# Patient Record
Sex: MALE | Race: WHITE | NOT HISPANIC OR LATINO | Employment: OTHER | URBAN - METROPOLITAN AREA
[De-identification: names, ages, dates, MRNs, and addresses within clinical notes are randomized per-mention and may not be internally consistent; named-entity substitution may affect disease eponyms.]

---

## 2018-09-04 ENCOUNTER — TRANSFERRED RECORDS (OUTPATIENT)
Dept: HEALTH INFORMATION MANAGEMENT | Facility: CLINIC | Age: 78
End: 2018-09-04

## 2018-09-04 LAB
ALBUMIN SERPL-MCNC: 4.7 G/DL
ALP SERPL-CCNC: 85 U/L
ALT SERPL-CCNC: 23 U/L
ANION GAP SERPL CALCULATED.3IONS-SCNC: ABNORMAL MMOL/L
AST SERPL-CCNC: 20 U/L
BILIRUB SERPL-MCNC: 0.7 MG/DL
BUN SERPL-MCNC: 15 MG/DL
CALCIUM SERPL-MCNC: 9 MG/DL
CHLORIDE SERPLBLD-SCNC: 107 MMOL/L
CHOLEST SERPL-MCNC: 98 MG/DL
CO2 SERPL-SCNC: 27 MMOL/L
CREAT SERPL-MCNC: 0.87 MG/DL
GFR SERPL CREATININE-BSD FRML MDRD: 84 ML/MIN/1.73M2
GLUCOSE SERPL-MCNC: 119 MG/DL (ref 70–99)
HDLC SERPL-MCNC: 41 MG/DL
LDLC SERPL CALC-MCNC: 40 MG/DL
NONHDLC SERPL-MCNC: 57 MG/DL
POTASSIUM SERPL-SCNC: 4.3 MMOL/L
PROT SERPL-MCNC: 7.1 G/DL
SODIUM SERPL-SCNC: 141 MMOL/L
TRIGL SERPL-MCNC: 86 MG/DL

## 2018-09-07 ENCOUNTER — HOSPITAL ENCOUNTER (OUTPATIENT)
Dept: CARDIOLOGY | Facility: CLINIC | Age: 78
Discharge: HOME | End: 2018-09-07
Admitting: INTERNAL MEDICINE
Payer: MEDICARE

## 2018-09-07 DIAGNOSIS — R01.1 HEART MURMUR: ICD-10-CM

## 2018-09-07 PROCEDURE — 93306 TTE W/DOPPLER COMPLETE: CPT | Mod: 26 | Performed by: INTERNAL MEDICINE

## 2018-09-07 PROCEDURE — 93306 TTE W/DOPPLER COMPLETE: CPT

## 2018-09-07 PROCEDURE — 25500064 ZZH RX 255 OP 636

## 2018-09-07 RX ADMIN — HUMAN ALBUMIN MICROSPHERES AND PERFLUTREN 3 ML: 10; .22 INJECTION, SOLUTION INTRAVENOUS at 11:18

## 2018-10-05 ENCOUNTER — DOCUMENTATION ONLY (OUTPATIENT)
Dept: CARDIOLOGY | Facility: CLINIC | Age: 78
End: 2018-10-05

## 2018-10-18 ENCOUNTER — CARE COORDINATION (OUTPATIENT)
Dept: CARDIOLOGY | Facility: CLINIC | Age: 78
End: 2018-10-18

## 2018-10-18 NOTE — PROGRESS NOTES
"Called to patient states had an angiogram [facility] for chest pains Approx 10 yrs ago and reports \"they did not find anything with my heart\" - he does not have a copy of records but will sign release at visit -  Alerted rooming MA to please obtain release and fax for records.  Called to Primary MD office DR Torres [facility] - requested records.  Joan Sandoval RN 10/18/18 11:53 AM         "

## 2018-10-25 ENCOUNTER — OFFICE VISIT (OUTPATIENT)
Dept: CARDIOLOGY | Facility: CLINIC | Age: 78
End: 2018-10-25
Payer: MEDICARE

## 2018-10-25 ENCOUNTER — HOSPITAL ENCOUNTER (OUTPATIENT)
Dept: GENERAL RADIOLOGY | Facility: CLINIC | Age: 78
Discharge: HOME | End: 2018-10-25
Attending: INTERNAL MEDICINE | Admitting: INTERNAL MEDICINE
Payer: MEDICARE

## 2018-10-25 ENCOUNTER — CARE COORDINATION (OUTPATIENT)
Dept: CARDIOLOGY | Facility: CLINIC | Age: 78
End: 2018-10-25

## 2018-10-25 VITALS
WEIGHT: 191 LBS | HEART RATE: 66 BPM | OXYGEN SATURATION: 97 % | SYSTOLIC BLOOD PRESSURE: 130 MMHG | BODY MASS INDEX: 26.74 KG/M2 | DIASTOLIC BLOOD PRESSURE: 70 MMHG | HEIGHT: 71 IN

## 2018-10-25 DIAGNOSIS — E11.9 TYPE 2 DIABETES MELLITUS WITHOUT COMPLICATION, WITHOUT LONG-TERM CURRENT USE OF INSULIN (H): ICD-10-CM

## 2018-10-25 DIAGNOSIS — I10 BENIGN ESSENTIAL HYPERTENSION: ICD-10-CM

## 2018-10-25 DIAGNOSIS — I35.0 NONRHEUMATIC AORTIC VALVE STENOSIS: Primary | ICD-10-CM

## 2018-10-25 DIAGNOSIS — E78.5 HYPERLIPIDEMIA LDL GOAL <130: ICD-10-CM

## 2018-10-25 DIAGNOSIS — I35.0 NONRHEUMATIC AORTIC VALVE STENOSIS: ICD-10-CM

## 2018-10-25 PROCEDURE — 71046 X-RAY EXAM CHEST 2 VIEWS: CPT

## 2018-10-25 PROCEDURE — 99204 OFFICE O/P NEW MOD 45 MIN: CPT | Performed by: INTERNAL MEDICINE

## 2018-10-25 PROCEDURE — 93000 ELECTROCARDIOGRAM COMPLETE: CPT | Performed by: INTERNAL MEDICINE

## 2018-10-25 RX ORDER — DILTIAZEM HYDROCHLORIDE 240 MG/1
240 CAPSULE, COATED, EXTENDED RELEASE ORAL DAILY
Status: ON HOLD | COMMUNITY
End: 2023-02-14

## 2018-10-25 RX ORDER — ROSUVASTATIN CALCIUM 20 MG/1
20 TABLET, COATED ORAL DAILY
COMMUNITY
End: 2022-04-07

## 2018-10-25 RX ORDER — VITAMIN E 268 MG
400 CAPSULE ORAL DAILY
COMMUNITY

## 2018-10-25 RX ORDER — ASPIRIN 325 MG
325 TABLET ORAL DAILY
COMMUNITY
End: 2019-10-03

## 2018-10-25 NOTE — PROGRESS NOTES
Reviewed the AVS (After Visit Summary) with patient in detail following their office visit with Dr. Villatoro. The patient was educated on the outlined plan of care including ordered tests, labs, medication changes, and follow up. Patient verbalized understanding of the information and agrees to call with any questions or concerns. Wife present.  Reviewed SKYLER procedure instructions - denies oral issues or surgeries.  Faxed 2nd request for Primary MD records and labs from Dr Torres in [place]  Return appointment: Patient was given instructions on when and how to schedule their next office visit with the clinic.  Joan SHANKARN, MA, RN  RN Care Coordinator  [facility]  [phone]

## 2018-10-25 NOTE — MR AVS SNAPSHOT
After Visit Summary   10/25/2018    Reynaldo Monteiro    MRN: [id number]           Patient Information     Date Of Birth          1940        Visit Information        Provider Department      10/25/2018 12:45 PM Kulwinder Villatoro MD [facility]        Today's Diagnoses     Nonrheumatic aortic valve stenosis    -  1    Benign essential hypertension        Type 2 diabetes mellitus without complication, without long-term current use of insulin (H)          Care Instructions    ADDITIONAL TESTIN.  Transesophageal echocardiogram.  To be done my by myself at [facility].  2.  Nursing to obtain labs from [facility] done recently.  I will require the following - CBC, comprehensive metabolic panel, NT proBNP, TSH, fasting lipid panel.  3.  Chest x-ray 2 views.    FOLLOW-UP:  With the results of the above.    If you have any questions or concerns, please call my nurse Joan Wells at [phone].            Follow-ups after your visit        Additional Services     Follow-Up with Cardiologist       POST SKYLER                  Your next 10 appointments already scheduled     Oct 25, 2018  3:30 PM CDT   XR CHEST 2 VIEWS with [id number]   [facility] ([facility])    [address] [address] [address] [phone]           How do I prepare for my exam? (Food and drink instructions) No Food and Drink Restrictions.  How do I prepare for my exam? (Other instructions) You do not need to do anything special for this exam.  What should I wear: Wear comfortable clothes.  How long does the exam take: Most scans take less than 5 minutes.  What should I bring: Bring a list of your medicines, including vitamins, minerals and over-the-counter drugs. It is safest to leave personal items at home.  Do I need a :  No  is needed.  What do I need to tell my doctor: Tell your doctor if there s  any chance you are pregnant.  What should I do after the exam: No restrictions, You may resume normal activities.  What is this test: An image of a specific body part shown in shades of black and white.  Who should I call with questions: If you have any questions, please call the Imaging Department where you will have your exam. Directions, parking instructions, and other information is available on our website, [email or web address].            Nov 01, 2018  9:30 AM CDT   (Arrive by 7:30 AM)   Ech Carter with [id number]   [facility] ([facility])    [address] [place] [address]   [phone]           1.  Please bring or wear a comfortable two-piece outfit. 2.  Arrival time: -   [facility]:  arrive 75 minutes prior to examination time. -   [facility]:  arrive 90 minutes prior to examination time. -   [facility]:   arrive 15 minutes prior to examination time. 3.  Plan to have a responsible adult take you home after the test. After the exam you may not drive, take a bus or taxi by yourself. -   Someone should stay with you for 6 hours after your test. 4.  No food or drink: -   6 hours before the test 5.  If you take antacids or water pills (diuretics): Do not take them until after your test. You may take blood pressure medicine with a few sips of water. 6.  If you have diabetes: -   Morning slots preferred -   If you take insulin, call your diabetes care team. Ask if you should take a   dose the morning of your test. -   If you take diabetes medicine by mouth, don't take it on the morning of your test. Bring it with you to take after the test. (If you have questions, call your diabetes care team.) 7.  Bring a list of any medicines you are taking. 8.  Do not drive for 24 hours after the test. 9.  For any questions that cannot be answered, please contact the ordering physician 10. Please do not wear perfumes or scented lotions on the day of your exam.            Nov 26, 2018  " 3:15 PM CST   Return Visit with Kulwinder Villatoro MD   [facility] ([facility])    [id number]  [place] [address] [phone] OPT 2              Future tests that were ordered for you today     Open Future Orders        Priority Expected Expires Ordered    Follow-Up with Cardiologist Routine 11/24/2018 10/25/2019 10/25/2018    XR Chest 2 Views Routine 11/1/2018 10/25/2019 10/25/2018    Transesophageal Echocardiogram Routine 11/9/2018 10/25/2019 10/25/2018            Who to contact     If you have questions or need follow up information about today's clinic visit or your schedule please contact [facility] directly at [phone].  Normal or non-critical lab and imaging results will be communicated to you by MyChart, letter or phone within 4 business days after the clinic has received the results. If you do not hear from us within 7 days, please contact the clinic through MyChart or phone. If you have a critical or abnormal lab result, we will notify you by phone as soon as possible.  Submit refill requests through [employer] or call your pharmacy and they will forward the refill request to us. Please allow 3 business days for your refill to be completed.          Additional Information About Your Visit        Care EveryWhere ID     This is your Care EveryWhere ID. This could be used by other organizations to access your [place] medical records  [id number]        Your Vitals Were     Pulse Height Pulse Oximetry BMI (Body Mass Index)          66 1.791 m (5' 10.5\") 97% 27.02 kg/m2         Blood Pressure from Last 3 Encounters:   10/25/18 130/70    Weight from Last 3 Encounters:   10/25/18 86.6 kg (191 lb)              We Performed the Following     EKG 12-lead complete w/read - Clinics (performed today)        Primary Care Provider Fax #    [facility] [phone]       [address] [place] [address] " [facility] [address]        Equal Access to Services     HALEY LARA : Hadii aad ku hadcharlotteconsuelo Galvin, wabereketda dorothea, qacalixtota phyllis kamara. So [facility] [phone].    ATENCIÓN: Si habla español, tiene a evans disposición servicios gratuitos de asistencia lingüística. [facility] [phone].    We comply with applicable federal civil rights laws and [place] laws. We do not discriminate on the basis of race, color, national origin, age, disability, sex, sexual orientation, or gender identity.            Thank you!     Thank you for choosing [facility]  for your care. Our goal is always to provide you with excellent care. Hearing back from our patients is one way we can continue to improve our services. Please take a few minutes to complete the written survey that you may receive in the mail after your visit with us. Thank you!             Your Updated Medication List - Protect others around you: Learn how to safely use, store and throw away your medicines at www.disposemymeds.org.          This list is accurate as of 10/25/18  2:45 PM.  Always use your most recent med list.                   Brand Name Dispense Instructions for use Diagnosis    aspirin 325 MG tablet      Take 325 mg by mouth daily        CARTIA  MG 24 hr capsule   Generic drug:  diltiazem      Take by mouth daily        DOXAZOSIN MESYLATE PO      Take 4 mg by mouth Take 1/2 tablet at bed time        METFORMIN HCL PO      Take 500 mg by mouth Take  1/2 tablet daily        Metoprolol Succinate 25 MG Cs24      Take 25 mg by mouth        rosuvastatin 20 MG tablet    CRESTOR     Take 20 mg by mouth daily        SUPER B COMPLEX MAXI PO           VITAMIN D (CHOLECALCIFEROL) PO      Take 2,000 Units by mouth daily        vitamin E 400 UNIT capsule      Take 400 Units by mouth daily

## 2018-10-25 NOTE — LETTER
10/25/2018    [address] [address]    RE: Reynaldo Monteiro       Dear Colleague,    I had the pleasure of seeing Reynaldo Monteiro in the [facility].    Clinic visit note dictated. Dictation reference number - [id number]        REVIEW OF SYSTEMS:  A comprehensive 10-point review of systems was completed and the pertinent positives are documented in the history of present illness.    Skin:  Positive for hair changes   Eyes:  Positive for glasses  ENT:  Negative    Respiratory:  Negative    Cardiovascular:  Negative    Gastroenterology: Negative    Genitourinary:  Positive for urinary frequency  Musculoskeletal:  Positive for back pain  Neurologic:  Positive for stroke  Psychiatric:  Negative    Heme/Lymph/Imm:  Negative    Endocrine:  Positive for diabetes    CURRENT MEDICATIONS:  Current Outpatient Prescriptions   Medication Sig Dispense Refill     aspirin 325 MG tablet Take 325 mg by mouth daily       B Complex-Folic Acid (SUPER B COMPLEX MAXI PO)        diltiazem (CARTIA XT) 240 MG 24 hr capsule Take by mouth daily       DOXAZOSIN MESYLATE PO Take 4 mg by mouth Take 1/2 tablet at bed time       METFORMIN HCL PO Take 500 mg by mouth Take  1/2 tablet daily       Metoprolol Succinate 25 MG CS24 Take 25 mg by mouth       rosuvastatin (CRESTOR) 20 MG tablet Take 20 mg by mouth daily       VITAMIN D, CHOLECALCIFEROL, PO Take 2,000 Units by mouth daily       vitamin E 400 UNIT capsule Take 400 Units by mouth daily           ALLERGIES:  No Known Allergies    PAST MEDICAL HISTORY:    Past Medical History:   Diagnosis Date     Aortic stenosis      Chest pain        PAST SURGICAL HISTORY:    Past Surgical History:   Procedure Laterality Date     ANGIOGRAM      Patient reports coronary angiogram at [place] approx 10 years ago       FAMILY HISTORY:    Family History   Problem Relation Age of Onset     Hypertension Mother      valve replacement       "Coronary Artery Disease Father        SOCIAL HISTORY:    Social History     Social History     Marital status:      Spouse name: N/A     Number of children: N/A     Years of education: N/A     Social History Main Topics     Smoking status: Never Smoker     Smokeless tobacco: Never Used     Alcohol use Yes      Comment: 1-2 a week      Drug use: No     Sexual activity: Not Asked     Other Topics Concern     None     Social History Narrative       PHYSICAL EXAM:    Vitals: /70 (BP Location: Right arm, Patient Position: Sitting, Cuff Size: Adult Large)  Pulse 66  Ht 1.791 m (5' 10.5\")  Wt 86.6 kg (191 lb)  SpO2 97%  BMI 27.02 kg/m2  Wt Readings from Last 5 Encounters:   10/25/18 86.6 kg (191 lb)           Encounter Diagnoses   Name Primary?     Nonrheumatic aortic valve stenosis Yes     Benign essential hypertension      Type 2 diabetes mellitus without complication, without long-term current use of insulin (H)        Orders Placed This Encounter   Procedures     XR Chest 2 Views     Follow-Up with Cardiologist     EKG 12-lead complete w/read - Clinics (performed today)     Transesophageal Echocardiogram       CC  REFERRING PROVIDER:  [place] [address]                  Thank you for allowing me to participate in the care of your patient.      Sincerely,     Kulwinder Villatoro MD     [facility]    cc:   [place] [address]        "

## 2018-10-25 NOTE — LETTER
10/25/2018      [address] [address]      RE: Reynaldo Monteiro       Dear Colleague,    I had the pleasure of seeing Reynaldo Monteiro in the [facility].    Service Date: 10/25/2018      LOCATION:  [facility], [place], [place]       PRIMARY CARE PROVIDER:  [facility]      REASON FOR VISIT:   1.  New finding of severe aortic valve stenosis on recent echocardiogram.      HISTORY OF PRESENT ILLNESS:  Mr. Monteiro is new to Cardiology.  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  His medical history is significant for optimally treated hypertension, hyperlipidemia (on rosuvastatin 20 mg daily), type 2 diabetes mellitus (on metformin), BMI 27 kg/m2, a lifelong never tobacco user.      The patient went to see his primary care provider recently and a cardiac murmur was auscultated.  Echocardiogram was consistent with severe aortic valve stenosis.  I personally reviewed the images.  The aortic valve is heavily calcified, probably trileaflet, and has severe stenosis with a peak velocity of 4.0 meters per second, mean gradient 39 mmHg and a calculated valve area of 1.2 cm2.  There are small mobile echodensities on the aortic valve, which may represent degenerative strands.  There is mild aortic valve regurgitation.  There is mild concentric left ventricular hypertrophy with normal systolic function.  Visually estimated LVEF 55%-60%.  Grade I diastolic function.  Moderate mitral annular calcification with trace regurgitation.  Ascending aorta measures 3.9 cm, aortic root 3.9 cm.      The patient is completely asymptomatic.  He does a lot of physical work around the house, walks extensively and denies any chest pain, dyspnea, palpitations, presyncope.  There is no family history of known valvular heart disease.  He has never had infective endocarditis in the past.  No history of  chest wall irradiation.      LABS - He has it done through his primary care provider at the [facility].  I did not have access to them today.      ECG done today shows sinus rhythm of 70 BPM, right bundle branch block with left axis (bifascicular block).   milliseconds, QTc 435 milliseconds.      Transthoracic echocardiogram 09/07/2018 - As above.      CURRENT MEDICATIONS:   1.  Aspirin 325 mg daily.   2.  Diltiazem (Cartia XT) 240 mg daily.   3.  Doxazosin 2 mg at bedtime.   4.  Metformin 250 mg daily.   5.  Metoprolol succinate 25 mg daily.   6.  Rosuvastatin 20 mg daily.   7.  Vitamin D.   8.  Vitamin E.   9.  B complex.      ALLERGIES:  No known allergies.      PHYSICAL EXAMINATION:   VITAL SIGNS:  /70, pulse 66 per minute, height 1.8 meters (5 feet 11 inches), weight 86.6 kg (191 pounds), sats 97% on room air, BMI 27.0 kg/m2.   CONSTITUTIONAL:  Comfortable at rest, normal body habitus, looks younger than his stated age.   EYES:  Normal.   ENT:  Satisfactory dentition, no cyanosis.   NECK:  No thyromegaly.   LUNGS:  Clear to auscultation.   CARDIOVASCULAR:  Normal JVP.  Carotid upstroke is normal with good volume.  Apical impulse undisplaced.  Heart sounds are regular.  Second heart sound is audible.  Late peaking ejection systolic murmur, 3/6 consistent with significant aortic valve stenosis.   GASTROINTESTINAL:  Soft, nontender, no hepatosplenomegaly.  Active bowel sounds.   SKIN/MUSCULOSKELETAL/NEUROPSYCHIATRIC:  Normal.   EXTREMITIES:  No edema or clubbing.      DIAGNOSES:   1.  Significant nonrheumatic aortic valve stenosis (peak velocity 4.0 meters per second, mean gradient 39 mmHg).   2.  Hypertension.   3.  Type 2 diabetes mellitus without complication, not on insulin.   4.  Hyperlipidemia.      ASSESSMENT/PLAN:   1.  Clinically, the patient has significant, but not critical aortic valve stenosis.  The murmur is late peaking, but the second heart sound is audible, and carotid  upstroke is normal.  Similarly, on echocardiogram, there are some discrepant findings.  The peak velocity and mean gradient are in the severe category, but the valve area is moderate at 1.2 cm2.  He also has some mobile echodensities on his aortic valve which could be degenerative strands.  To evaluate further a transesophageal echocardiogram would be helpful.   2.  Transesophageal echocardiogram.  I would like to perform this personally.  Scheduled for next week, 2018.   3.  Chest x-ray.   4.  He will bring us a copy of his labs from his primary care provider.   5.  Follow up with the results of the SKYLER.  If the aortic valve stenosis is indeed severe, given that the patient denies any symptoms, it would be reasonable to proceed to a treadmill stress test.      It was my pleasure to visit with this melissa couple.  I look forward to seeing them again.      cc:   [address] [address]         [facility] J[facility]             D: 10/29/2018   T: 10/29/2018   MT: al      Name:     BUTCH MANUEL   MRN:      [id number]        Account:      [id number]   :      1940           Service Date: 10/25/2018      Document: [id number]         Outpatient Encounter Prescriptions as of 10/25/2018   Medication Sig Dispense Refill     aspirin 325 MG tablet Take 325 mg by mouth daily       B Complex-Folic Acid (SUPER B COMPLEX MAXI PO)        diltiazem (CARTIA XT) 240 MG 24 hr capsule Take by mouth daily       DOXAZOSIN MESYLATE PO Take 4 mg by mouth Take 1/2 tablet at bed time       METFORMIN HCL PO Take 500 mg by mouth Take  1/2 tablet daily       Metoprolol Succinate 25 MG CS24 Take 25 mg by mouth       rosuvastatin (CRESTOR) 20 MG tablet Take 20 mg by mouth daily       VITAMIN D, CHOLECALCIFEROL, PO Take 2,000 Units by mouth daily       vitamin E 400 UNIT capsule Take 400 Units by mouth daily       No facility-administered encounter medications on file as of 10/25/2018.         Again, thank you for allowing me to participate in the care of your patient.      Sincerely,    Kulwinder Villatoro MD     [facility]

## 2018-10-29 NOTE — PROGRESS NOTES
Service Date: 10/25/2018      LOCATION:  [facility], [place], [place]       PRIMARY CARE PROVIDER:  [facility]      REASON FOR VISIT:   1.  New finding of severe aortic valve stenosis on recent echocardiogram.      HISTORY OF PRESENT ILLNESS:    Mr. Monteiro is new to Cardiology.  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  His medical history is significant for optimally treated hypertension, hyperlipidemia (on rosuvastatin 20 mg daily), type 2 diabetes mellitus (on metformin), BMI 27 kg/m2, a lifelong never tobacco user.      The patient went to see his primary care provider recently and a cardiac murmur was auscultated.  Echocardiogram was consistent with severe aortic valve stenosis.  I personally reviewed the images.  The aortic valve is heavily calcified, probably trileaflet, and has severe stenosis with a peak velocity of 4.0 meters per second, mean gradient 39 mmHg and a calculated valve area of 1.2 cm2.  There are small mobile echodensities on the aortic valve, which may represent degenerative strands.  There is mild aortic valve regurgitation.  There is mild concentric left ventricular hypertrophy with normal systolic function.  Visually estimated LVEF 55%-60%.  Grade I diastolic function.  Moderate mitral annular calcification with trace regurgitation.  Ascending aorta measures 3.9 cm, aortic root 3.9 cm.      The patient is completely asymptomatic.  He does a lot of physical work around the house, walks extensively and denies any chest pain, dyspnea, palpitations, presyncope.  There is no family history of known valvular heart disease.  He has never had infective endocarditis in the past.  No history of chest wall irradiation.      LABS - He has it done through his primary care provider at the [facility].  I did not have access to them today.      ECG done today shows sinus rhythm of 70 BPM, right bundle branch  block with left axis (bifascicular block).   milliseconds, QTc 435 milliseconds.      Transthoracic echocardiogram 09/07/2018 - As above.      CURRENT MEDICATIONS:   1.  Aspirin 325 mg daily.   2.  Diltiazem (Cartia XT) 240 mg daily.   3.  Doxazosin 2 mg at bedtime.   4.  Metformin 250 mg daily.   5.  Metoprolol succinate 25 mg daily.   6.  Rosuvastatin 20 mg daily.   7.  Vitamin D.   8.  Vitamin E.   9.  B complex.      ALLERGIES:  No known allergies.      PHYSICAL EXAMINATION:   VITAL SIGNS:  /70, pulse 66 per minute, height 1.8 meters (5 feet 11 inches), weight 86.6 kg (191 pounds), sats 97% on room air, BMI 27.0 kg/m2.   CONSTITUTIONAL:  Comfortable at rest, normal body habitus, looks younger than his stated age.   EYES:  Normal.   ENT:  Satisfactory dentition, no cyanosis.   NECK:  No thyromegaly.   LUNGS:  Clear to auscultation.   CARDIOVASCULAR:  Normal JVP.  Carotid upstroke is normal with good volume.  Apical impulse undisplaced.  Heart sounds are regular.  Second heart sound is audible.  Late peaking ejection systolic murmur, 3/6 consistent with significant aortic valve stenosis.   GASTROINTESTINAL:  Soft, nontender, no hepatosplenomegaly.  Active bowel sounds.   SKIN/MUSCULOSKELETAL/NEUROPSYCHIATRIC:  Normal.   EXTREMITIES:  No edema or clubbing.      DIAGNOSES:   1.  Significant nonrheumatic aortic valve stenosis (peak velocity 4.0 meters per second, mean gradient 39 mmHg).   2.  Hypertension.   3.  Type 2 diabetes mellitus without complication, not on insulin.   4.  Hyperlipidemia.      ASSESSMENT/PLAN:   1.  Clinically, the patient has significant, but not critical aortic valve stenosis.  The murmur is late peaking, but the second heart sound is audible, and carotid upstroke is normal.  Similarly, on echocardiogram, there are some discrepant findings.  The peak velocity and mean gradient are in the severe category, but the valve area is moderate at 1.2 cm2.  He also has some mobile  echodensities on his aortic valve which could be degenerative strands.  To evaluate further a transesophageal echocardiogram would be helpful.   2.  Chest x-ray.   3.  He will bring us a copy of his labs from his primary care provider.   4.  Follow up with the results of the SKYLER.  If the aortic valve stenosis is indeed severe, given that the patient denies any symptoms, it would be reasonable to proceed to a treadmill stress test.      It was my pleasure to visit with this melissa couple.  I look forward to seeing them again.      cc:   [address] [address]         [facility] JEREMIE LI MD             D: 10/29/2018   T: 10/29/2018   MT: al      Name:     BUTCH MANUEL   MRN:      [id number]        Account:      [id number]   :      1940           Service Date: 10/25/2018      Document: [id number]

## 2018-10-29 NOTE — PROGRESS NOTES
Clinic visit note dictated. Dictation reference number - [id number]        REVIEW OF SYSTEMS:  A comprehensive 10-point review of systems was completed and the pertinent positives are documented in the history of present illness.    Skin:  Positive for hair changes   Eyes:  Positive for glasses  ENT:  Negative    Respiratory:  Negative    Cardiovascular:  Negative    Gastroenterology: Negative    Genitourinary:  Positive for urinary frequency  Musculoskeletal:  Positive for back pain  Neurologic:  Positive for stroke  Psychiatric:  Negative    Heme/Lymph/Imm:  Negative    Endocrine:  Positive for diabetes    CURRENT MEDICATIONS:  Current Outpatient Prescriptions   Medication Sig Dispense Refill     aspirin 325 MG tablet Take 325 mg by mouth daily       B Complex-Folic Acid (SUPER B COMPLEX MAXI PO)        diltiazem (CARTIA XT) 240 MG 24 hr capsule Take by mouth daily       DOXAZOSIN MESYLATE PO Take 4 mg by mouth Take 1/2 tablet at bed time       METFORMIN HCL PO Take 500 mg by mouth Take  1/2 tablet daily       Metoprolol Succinate 25 MG CS24 Take 25 mg by mouth       rosuvastatin (CRESTOR) 20 MG tablet Take 20 mg by mouth daily       VITAMIN D, CHOLECALCIFEROL, PO Take 2,000 Units by mouth daily       vitamin E 400 UNIT capsule Take 400 Units by mouth daily           ALLERGIES:  No Known Allergies    PAST MEDICAL HISTORY:    Past Medical History:   Diagnosis Date     Aortic stenosis      Chest pain        PAST SURGICAL HISTORY:    Past Surgical History:   Procedure Laterality Date     ANGIOGRAM      Patient reports coronary angiogram at [place], [place] approx 10 years ago       FAMILY HISTORY:    Family History   Problem Relation Age of Onset     Hypertension Mother      valve replacement      Coronary Artery Disease Father        SOCIAL HISTORY:    Social History     Social History     Marital status:      Spouse name: N/A     Number of children: N/A     Years of education: N/A     Social History Main  "Topics     Smoking status: Never Smoker     Smokeless tobacco: Never Used     Alcohol use Yes      Comment: 1-2 a week      Drug use: No     Sexual activity: Not Asked     Other Topics Concern     None     Social History Narrative       PHYSICAL EXAM:    Vitals: /70 (BP Location: Right arm, Patient Position: Sitting, Cuff Size: Adult Large)  Pulse 66  Ht 1.791 m (5' 10.5\")  Wt 86.6 kg (191 lb)  SpO2 97%  BMI 27.02 kg/m2  Wt Readings from Last 5 Encounters:   10/25/18 86.6 kg (191 lb)           Encounter Diagnoses   Name Primary?     Nonrheumatic aortic valve stenosis Yes     Benign essential hypertension      Type 2 diabetes mellitus without complication, without long-term current use of insulin (H)        Orders Placed This Encounter   Procedures     XR Chest 2 Views     Follow-Up with Cardiologist     EKG 12-lead complete w/read - Clinics (performed today)     Transesophageal Echocardiogram       CC  REFERRING PROVIDER:  [address] [address]                "

## 2018-10-30 ENCOUNTER — TELEPHONE (OUTPATIENT)
Dept: CARDIOLOGY | Facility: CLINIC | Age: 78
End: 2018-10-30

## 2018-10-30 NOTE — TELEPHONE ENCOUNTER
SKYLER  Scheduled: 11-01-18  Location: [facility]  Check-in time: 730 am   Procedure time:  930 am    Nothing to eat or drink for 6 hours before the procedure.      Patient should not take antacids on the morning of the procedure.     Medications that can be taken on the morning of the procedure (with small sips of water): ASA, Diltiazem, Metoprolol Succinate.       Patient should take his other medications when he returns home.       Patient will need to arrange for someone to drive him home and stay with him for 6 hours after the procedure.     ----------------------------------------------------------------------------------------------------------------------    Prep instructions were reviewed with patient. Patient plans on not taking any medications on the morning of the procedure. Patient has no questions Maria Dolores ANDERSON

## 2018-11-01 ENCOUNTER — HOSPITAL ENCOUNTER (OUTPATIENT)
Facility: CLINIC | Age: 78
Discharge: HOME | End: 2018-11-01
Attending: INTERNAL MEDICINE | Admitting: INTERNAL MEDICINE
Payer: MEDICARE

## 2018-11-01 ENCOUNTER — HOSPITAL ENCOUNTER (OUTPATIENT)
Dept: CARDIOLOGY | Facility: CLINIC | Age: 78
End: 2018-11-01
Attending: INTERNAL MEDICINE | Admitting: INTERNAL MEDICINE
Payer: MEDICARE

## 2018-11-01 VITALS
BODY MASS INDEX: 26.99 KG/M2 | HEIGHT: 71 IN | TEMPERATURE: 96.6 F | WEIGHT: 192.8 LBS | OXYGEN SATURATION: 95 % | HEART RATE: 67 BPM | RESPIRATION RATE: 9 BRPM | SYSTOLIC BLOOD PRESSURE: 132 MMHG | DIASTOLIC BLOOD PRESSURE: 79 MMHG

## 2018-11-01 DIAGNOSIS — I35.0 NONRHEUMATIC AORTIC VALVE STENOSIS: ICD-10-CM

## 2018-11-01 DIAGNOSIS — E11.9 TYPE 2 DIABETES MELLITUS WITHOUT COMPLICATION, WITHOUT LONG-TERM CURRENT USE OF INSULIN (H): ICD-10-CM

## 2018-11-01 DIAGNOSIS — I10 BENIGN ESSENTIAL HYPERTENSION: ICD-10-CM

## 2018-11-01 PROCEDURE — 93312 ECHO TRANSESOPHAGEAL: CPT | Mod: 26 | Performed by: INTERNAL MEDICINE

## 2018-11-01 PROCEDURE — 40000857 ZZH STATISTIC TEE INCLUDES SEDATION

## 2018-11-01 PROCEDURE — 93325 DOPPLER ECHO COLOR FLOW MAPG: CPT | Mod: 26 | Performed by: INTERNAL MEDICINE

## 2018-11-01 PROCEDURE — 25000125 ZZHC RX 250: Performed by: INTERNAL MEDICINE

## 2018-11-01 PROCEDURE — 93320 DOPPLER ECHO COMPLETE: CPT

## 2018-11-01 PROCEDURE — 25000128 H RX IP 250 OP 636: Performed by: INTERNAL MEDICINE

## 2018-11-01 PROCEDURE — 40000235 ZZH STATISTIC TELEMETRY

## 2018-11-01 PROCEDURE — 93320 DOPPLER ECHO COMPLETE: CPT | Mod: 26 | Performed by: INTERNAL MEDICINE

## 2018-11-01 RX ORDER — FLUMAZENIL 0.1 MG/ML
0.2 INJECTION, SOLUTION INTRAVENOUS
Status: DISCONTINUED | OUTPATIENT
Start: 2018-11-01 | End: 2018-11-01 | Stop reason: HOSPADM

## 2018-11-01 RX ORDER — SODIUM CHLORIDE 9 MG/ML
INJECTION, SOLUTION INTRAVENOUS CONTINUOUS PRN
Status: DISCONTINUED | OUTPATIENT
Start: 2018-11-01 | End: 2018-11-01 | Stop reason: HOSPADM

## 2018-11-01 RX ORDER — GLYCOPYRROLATE 0.2 MG/ML
0.1 INJECTION, SOLUTION INTRAMUSCULAR; INTRAVENOUS ONCE
Status: COMPLETED | OUTPATIENT
Start: 2018-11-01 | End: 2018-11-01

## 2018-11-01 RX ORDER — FENTANYL CITRATE 50 UG/ML
25-50 INJECTION, SOLUTION INTRAMUSCULAR; INTRAVENOUS
Status: COMPLETED | OUTPATIENT
Start: 2018-11-01 | End: 2018-11-01

## 2018-11-01 RX ORDER — FENTANYL CITRATE 50 UG/ML
25 INJECTION, SOLUTION INTRAMUSCULAR; INTRAVENOUS
Status: DISCONTINUED | OUTPATIENT
Start: 2018-11-01 | End: 2018-11-01 | Stop reason: HOSPADM

## 2018-11-01 RX ORDER — NALOXONE HYDROCHLORIDE 0.4 MG/ML
.1-.4 INJECTION, SOLUTION INTRAMUSCULAR; INTRAVENOUS; SUBCUTANEOUS
Status: DISCONTINUED | OUTPATIENT
Start: 2018-11-01 | End: 2018-11-01 | Stop reason: HOSPADM

## 2018-11-01 RX ORDER — LIDOCAINE 40 MG/G
CREAM TOPICAL
Status: DISCONTINUED | OUTPATIENT
Start: 2018-11-01 | End: 2018-11-01 | Stop reason: HOSPADM

## 2018-11-01 RX ORDER — LIDOCAINE HYDROCHLORIDE 40 MG/ML
1.5 SOLUTION TOPICAL ONCE
Status: COMPLETED | OUTPATIENT
Start: 2018-11-01 | End: 2018-11-01

## 2018-11-01 RX ADMIN — LIDOCAINE HYDROCHLORIDE 1.5 ML: 40 SOLUTION TOPICAL at 09:29

## 2018-11-01 RX ADMIN — MIDAZOLAM 1 MG: 1 INJECTION INTRAMUSCULAR; INTRAVENOUS at 10:45

## 2018-11-01 RX ADMIN — GLYCOPYRROLATE 0.1 MG: 0.2 INJECTION, SOLUTION INTRAMUSCULAR; INTRAVENOUS at 09:26

## 2018-11-01 RX ADMIN — MIDAZOLAM HYDROCHLORIDE 0.5 MG: 1 INJECTION, SOLUTION INTRAMUSCULAR; INTRAVENOUS at 09:40

## 2018-11-01 RX ADMIN — TOPICAL ANESTHETIC 0.5 ML: 200 SPRAY DENTAL; PERIODONTAL at 09:36

## 2018-11-01 RX ADMIN — MIDAZOLAM 0.5 MG: 1 INJECTION INTRAMUSCULAR; INTRAVENOUS at 09:40

## 2018-11-01 RX ADMIN — MIDAZOLAM 1 MG: 1 INJECTION INTRAMUSCULAR; INTRAVENOUS at 09:44

## 2018-11-01 RX ADMIN — FENTANYL CITRATE 50 MCG: 50 INJECTION INTRAMUSCULAR; INTRAVENOUS at 09:42

## 2018-11-01 RX ADMIN — TOPICAL ANESTHETIC 0.5 ML: 200 SPRAY DENTAL; PERIODONTAL at 09:41

## 2018-11-01 NOTE — DISCHARGE INSTRUCTIONS
SKYLER  (Transesophageal Echocardiogram)  Discharge Instructions    After you go home:      Have an adult stay with you for 6 hours.       For 24 hours - due to the sedation you received:    Relax and take it easy.    Do NOT make any important or legal decisions.    Do NOT drive or operate machines at home or at work.    Do NOT drink alcohol.    Diet:    You may resume your normal diet, but no scratchy foods for two days.    If your throat is sore, eat cold, bland or soft foods.    You may have heartburn if the tube used in the exam entered your stomach.  If so:   - Do not eat acidic and spicy foods.   - Do not eat three hours before bedtime. Clear liquids are okay.   - When lying down, use two pillows to raise your head.    Medicines:      Take your medications, including blood thinners, unless your provider tells you not to.    If you have stopped any medicines, check with your provider about when to restart them.    You may take Tylenol (Acetaminophen) if your throat is sore.    You may take antacids if you have heartburn.      Follow Up Appointments:      Follow up with your cardiologist at [facility] of patient preference as instructed.    Follow up with your primary care provider as needed.    Call the clinic if:      You have heartburn that is severe or lasts more than 72 hours.    You have a sore throat that feels worse after 72 hours.    You have shortness of breath, neck pain, chest pain, fever, chills, coughing up blood, or other unusual signs.    Questions or concerns      [facility] [place]:    [phone] [facility] (7 days a week)

## 2018-11-01 NOTE — PROGRESS NOTES
Here with wife for SKYLER. NPO, NKA, No fall risk. Reviewed procedure and medications, pt states understanding. Reviewed discharge instructions. Pt without questions.

## 2018-11-01 NOTE — IP AVS SNAPSHOT
MRN:[id number]                      After Visit Summary   11/1/2018    Reynaldo Monteiro    MRN: [id number]           Visit Information        Department      11/1/2018  6:29 AM [facility]          Review of your medicines      UNREVIEWED medicines. Ask your doctor about these medicines        Dose / Directions    aspirin 325 MG tablet        Dose:  325 mg   Take 325 mg by mouth daily   Refills:  0       CARTIA  MG 24 hr capsule   Generic drug:  diltiazem        Take by mouth daily   Refills:  0       DOXAZOSIN MESYLATE PO        Dose:  4 mg   Take 4 mg by mouth Take 1/2 tablet at bed time   Refills:  0       METFORMIN HCL PO        Dose:  500 mg   Take 500 mg by mouth Take  1/2 tablet daily   Refills:  0       Metoprolol Succinate 25 MG Cs24        Dose:  25 mg   Take 25 mg by mouth   Refills:  0       rosuvastatin 20 MG tablet   Commonly known as:  CRESTOR        Dose:  20 mg   Take 20 mg by mouth daily   Refills:  0       SUPER B COMPLEX MAXI PO        Refills:  0       VITAMIN D (CHOLECALCIFEROL) PO        Dose:  2000 Units   Take 2,000 Units by mouth daily   Refills:  0       vitamin E 400 UNIT capsule        Dose:  400 Units   Take 400 Units by mouth daily   Refills:  0                Protect others around you: Learn how to safely use, store and throw away your medicines at www.disposemymeds.org.         Follow-ups after your visit        Your next 10 appointments already scheduled     Nov 01, 2018  9:30 AM CDT   (Arrive by 7:30 AM)   Ech Carter with SHECHR2   [facility] ([facility])    [address] [place] [address]   [phone]           1.  Please bring or wear a comfortable two-piece outfit. 2.  Arrival time: -   [facility]:  arrive 75 minutes prior to examination time. -   [facility]:  arrive 90 minutes prior to examination time. -   [facility]:   arrive 15 minutes prior to examination time. 3.  Plan to have a  responsible adult take you home after the test. After the exam you may not drive, take a bus or taxi by yourself. -   Someone should stay with you for 6 hours after your test. 4.  No food or drink: -   6 hours before the test 5.  If you take antacids or water pills (diuretics): Do not take them until after your test. You may take blood pressure medicine with a few sips of water. 6.  If you have diabetes: -   Morning slots preferred -   If you take insulin, call your diabetes care team. Ask if you should take a   dose the morning of your test. -   If you take diabetes medicine by mouth, don't take it on the morning of your test. Bring it with you to take after the test. (If you have questions, call your diabetes care team.) 7.  Bring a list of any medicines you are taking. 8.  Do not drive for 24 hours after the test. 9.  For any questions that cannot be answered, please contact the ordering physician 10. Please do not wear perfumes or scented lotions on the day of your exam.            Nov 26, 2018  3:15 PM CST   Return Visit with Kulwinder Villatoro MD   [facility] ([facility])    [address] [place] [id number] [phone] OPT 2               Care Instructions        Further instructions from your care team       SKYLER  (Transesophageal Echocardiogram)  Discharge Instructions    After you go home:      Have an adult stay with you for 6 hours.       For 24 hours - due to the sedation you received:    Relax and take it easy.    Do NOT make any important or legal decisions.    Do NOT drive or operate machines at home or at work.    Do NOT drink alcohol.    Diet:    You may resume your normal diet, but no scratchy foods for two days.    If your throat is sore, eat cold, bland or soft foods.    You may have heartburn if the tube used in the exam entered your stomach.  If so:   - Do not eat acidic and spicy foods.   - Do not eat three hours before  "bedtime. Clear liquids are okay.   - When lying down, use two pillows to raise your head.    Medicines:      Take your medications, including blood thinners, unless your provider tells you not to.    If you have stopped any medicines, check with your provider about when to restart them.    You may take Tylenol (Acetaminophen) if your throat is sore.    You may take antacids if you have heartburn.      Follow Up Appointments:      Follow up with your cardiologist at [facility] of patient preference as instructed.    Follow up with your primary care provider as needed.    Call the clinic if:      You have heartburn that is severe or lasts more than 72 hours.    You have a sore throat that feels worse after 72 hours.    You have shortness of breath, neck pain, chest pain, fever, chills, coughing up blood, or other unusual signs.    Questions or concerns      [facility] [place]:    [phone] [facility] (7 days a week)         Additional Information About Your Visit        Care EveryWhere ID     This is your Care EveryWhere ID. This could be used by other organizations to access your [place] medical records  [id number]        Your Vitals Were     Blood Pressure Pulse Temperature Respirations Height Weight    139/79 (BP Location: Right arm) 67 96.6  F (35.9  C) (Oral) 18 1.803 m (5' 11\") 87.5 kg (192 lb 12.8 oz)    Pulse Oximetry BMI (Body Mass Index)                99% 26.89 kg/m2           Primary Care Provider Fax #    [facility] [phone]      Equal Access to Services     HALEY LARA : Hadii aad ku hadasho Soomaali, waaxda luqadaha, qaybta kaalmada adeegyada, waxay idiin hayaan margoth yuarawesley darby'germaine . So [facility] [phone].    ATENCIÓN: Si habla español, tiene a evans disposición servicios gratuitos de asistencia lingüística. Llame al [phone].    We comply with applicable federal civil rights laws and [place] laws. We do not discriminate on the basis of race, " color, national origin, age, disability, sex, sexual orientation, or gender identity.            Thank you!     Thank you for choosing [facility] for your care. Our goal is always to provide you with excellent care. Hearing back from our patients is one way we can continue to improve our services. Please take a few minutes to complete the written survey that you may receive in the mail after you visit with us. Thank you!             Medication List: This is a list of all your medications and when to take them. Check marks below indicate your daily home schedule. Keep this list as a reference.      Medications           Morning Afternoon Evening Bedtime As Needed    aspirin 325 MG tablet   Take 325 mg by mouth daily                                CARTIA  MG 24 hr capsule   Take by mouth daily   Generic drug:  diltiazem                                DOXAZOSIN MESYLATE PO   Take 4 mg by mouth Take 1/2 tablet at bed time                                METFORMIN HCL PO   Take 500 mg by mouth Take  1/2 tablet daily                                Metoprolol Succinate 25 MG Cs24   Take 25 mg by mouth                                rosuvastatin 20 MG tablet   Commonly known as:  CRESTOR   Take 20 mg by mouth daily                                SUPER B COMPLEX MAXI PO                                VITAMIN D (CHOLECALCIFEROL) PO   Take 2,000 Units by mouth daily                                vitamin E 400 UNIT capsule   Take 400 Units by mouth daily

## 2018-11-01 NOTE — PROGRESS NOTES
SKYLER. Consent obtained, time out taken. Pt received total of 3 mg IV versed and 50 mcg IV fentanyl for sedation. Tolerated procedure well. Awake post procedure, denies sore throat. VSS, weaned off O2 to RA with sats 96-99%. Wife in and spoke with Dr. Brewster.  Monitored for 1 hour post sedation and pt states he wants to be discharged. Discharged to care of wife. Pt has discharge instructions.

## 2018-11-01 NOTE — IP AVS SNAPSHOT
[place] [address]    [place] [address]    Phone:  [phone]                                       After Visit Summary   11/1/2018    Reynaldo Monteiro    MRN: [id number]           After Visit Summary Signature Page     I have received my discharge instructions, and my questions have been answered. I have discussed any challenges I see with this plan with the nurse or doctor.    ..........................................................................................................................................  Patient/Patient Representative Signature      ..........................................................................................................................................  Patient Representative Print Name and Relationship to Patient    ..................................................               ................................................  Date                                   Time    ..........................................................................................................................................  Reviewed by Signature/Title    ...................................................              ..............................................  Date                                               Time          22EPIC Rev 08/18

## 2018-11-02 ENCOUNTER — TELEPHONE (OUTPATIENT)
Dept: CARDIOLOGY | Facility: CLINIC | Age: 78
End: 2018-11-02

## 2018-11-02 NOTE — TELEPHONE ENCOUNTER
Please let the patient know that there were no surprises on the SKYLER.There is narrowing of the aortic valve similar to the transthoracic echo. I will discuss details at his upcoming clinic visit.     Thanks   Dr. Irving SWEENEY [facility]   Cardiology     Called patient and informed him of results per Dr Villatoro's message. Instructed to continue plan for OV 11/26/18 with Dr Villatoro. Pt verbalized understanding.

## 2018-11-13 ENCOUNTER — DOCUMENTATION ONLY (OUTPATIENT)
Dept: CARDIOLOGY | Facility: CLINIC | Age: 78
End: 2018-11-13

## 2018-11-26 ENCOUNTER — OFFICE VISIT (OUTPATIENT)
Dept: CARDIOLOGY | Facility: CLINIC | Age: 78
End: 2018-11-26
Attending: INTERNAL MEDICINE
Payer: MEDICARE

## 2018-11-26 VITALS
OXYGEN SATURATION: 98 % | SYSTOLIC BLOOD PRESSURE: 136 MMHG | WEIGHT: 195.7 LBS | HEIGHT: 71 IN | BODY MASS INDEX: 27.4 KG/M2 | HEART RATE: 70 BPM | DIASTOLIC BLOOD PRESSURE: 70 MMHG

## 2018-11-26 DIAGNOSIS — I35.0 NONRHEUMATIC AORTIC VALVE STENOSIS: Primary | ICD-10-CM

## 2018-11-26 DIAGNOSIS — E11.9 TYPE 2 DIABETES MELLITUS WITHOUT COMPLICATION, WITHOUT LONG-TERM CURRENT USE OF INSULIN (H): ICD-10-CM

## 2018-11-26 DIAGNOSIS — I10 BENIGN ESSENTIAL HYPERTENSION: ICD-10-CM

## 2018-11-26 PROCEDURE — 99214 OFFICE O/P EST MOD 30 MIN: CPT | Performed by: INTERNAL MEDICINE

## 2018-11-26 NOTE — PROGRESS NOTES
Service Date: 11/26/2018      PRIMARY CARE PROVIDER:  Mick Torres MD       REASON FOR VISIT:  Followup of aortic valve stenosis with results of a recent transesophageal echocardiogram.      HISTORY OF PRESENT ILLNESS:    Mr. Monteiro is a delightful, 78-year-old, retired  gentleman who was accompanied by his wife.  I had seen him a month ago on 10/25/2018 at the [place] office after a murmur was auscultated, and an echocardiogram showed significant aortic valve stenosis.  Due to discrepancy between the measured mean gradient and valve area and possible degenerative strands on the aortic valve, I had referred him for a transesophageal echocardiogram for clarification.  The patient also has optimally treated hypertension, hyperlipidemia (on rosuvastatin 20 mg daily), type 2 diabetes mellitus (on metformin), BMI 27 kg/m2, lifelong never tobacco user.        Pertinently, the patient has no symptoms and leads a fairly active life.      I personally reviewed the SKYLER images with the patient.  The aortic valve is trileaflet, and the aortic valve stenosis appears to be moderate in severity with reasonable systolic excursion of the valve leaflets.  There is mild aortic regurgitation.  Preserved biventricular systolic function.      Labs:  Total cholesterol 98, HDL 41, LDL 40, triglycerides 86.  Sodium 141, potassium 4.3, BUN 15, creatinine 0.8 with an estimated GFR of 84.      PHYSICAL EXAMINATION:   VITAL SIGNS:  /70, pulse 70 per minute and regular, height 1.8 m, weight 88.8 kg, BMI 27.7 kg/m2.   I reauscultated the patient today.  It is consistent with my findings a few weeks ago.   CARDIOVASCULAR:  Normal first heart sound, audible second heart sound, a late peaking 3/6 ejection systolic murmur radiating to both carotids.   LUNGS:  Clear.   EXTREMITIES:  No edema.      DIAGNOSES:   1.  Moderate aortic valve stenosis, asymptomatic.   2.  Hypertension, optimally treated.   3.  Type 2 diabetes mellitus, on  oral agents.   4.  Hyperlipidemia (LDL 40 on 20 mg of rosuvastatin).      ASSESSMENT/PLAN:    His echocardiogram images show that the aortic valve is moderate severity with mild regurgitation.  The presence of the regurgitation might explain the higher mean gradient than would be expected with a valve area of 1.2 cm2.  The patient's clinical exam is also consistent with moderate aortic valve stenosis.  He is asymptomatic and states he has an active life, and I would like to objectify this with a treadmill stress test.   1.  Treadmill stress test to assess symptomatic status within the context of moderate AS.  I advised him to hold metoprolol on the day of and day before test.  My office will call him with the results.   2.  I will follow up with him in 6 months.  I counseled him about the symptoms of aortic valve stenosis, natural history of the disease and when we would proceed to intervention.      It was my pleasure to visit with Butch and his wife.  I look forward to seeing them again.       cc:   Mick Torres MD   [address] JEREMIE LI MD             D: 2018   T: 2018   MT: [email or web address]      Name:     BUTCH MANUEL   MRN:      [id number]        Account:      [id number]   :      1940           Service Date: 2018      Document: [id number]

## 2018-11-26 NOTE — MR AVS SNAPSHOT
After Visit Summary   11/26/2018    Reynaldo Monteiro    MRN: [id number]           Patient Information     Date Of Birth          1940        Visit Information        Provider Department      11/26/2018 3:15 PM Kulwinder Villatoro MD [facility]        Today's Diagnoses     Nonrheumatic aortic valve stenosis    -  1    Benign essential hypertension        Type 2 diabetes mellitus without complication, without long-term current use of insulin (H)          Care Instructions    1.  No changes to medications today.    2.  Treadmill exercise test.  Hold the metoprolol on the day off and 24 hours prior to the stress test.  My office will call you with the results.    3.  Follow-up in 6 months with a previsit heart ultrasound (transthoracic echocardiogram).  If you get any of the symptoms we discussed, contact my office for an earlier appointment.    If you have any questions or concerns, please contact my nurses at [phone].            Follow-ups after your visit        Additional Services     Follow-Up with Cardiologist                 Future tests that were ordered for you today     Open Future Orders        Priority Expected Expires Ordered    Exercise Stress Test (Stress ECG) Routine 12/3/2018 11/26/2019 11/26/2018    EKG 12-lead complete w/read - Clinics Routine 5/25/2019 11/26/2019 11/26/2018    Basic metabolic panel Routine 5/25/2019 11/26/2019 11/26/2018    CBC with platelets differential Routine 5/25/2019 11/26/2019 11/26/2018    Echocardiogram Routine 5/25/2019 11/26/2019 11/26/2018    Follow-Up with Cardiologist Routine 5/25/2019 11/26/2019 11/26/2018            Who to contact     If you have questions or need follow up information about today's clinic visit or your schedule please contact [facility] directly at [phone].  Normal or non-critical lab and imaging results will be communicated to you by Joseph  "letter or phone within 4 business days after the clinic has received the results. If you do not hear from us within 7 days, please contact the clinic through [employer] or phone. If you have a critical or abnormal lab result, we will notify you by phone as soon as possible.  Submit refill requests through [employer] or call your pharmacy and they will forward the refill request to us. Please allow 3 business days for your refill to be completed.          Additional Information About Your Visit        Care EveryWhere ID     This is your Care EveryWhere ID. This could be used by other organizations to access your [employer] medical records  [id number]        Your Vitals Were     Pulse Height Pulse Oximetry BMI (Body Mass Index)          70 1.791 m (5' 10.5\") 98% 27.68 kg/m2         Blood Pressure from Last 3 Encounters:   11/26/18 136/70   11/01/18 132/79   10/25/18 130/70    Weight from Last 3 Encounters:   11/26/18 88.8 kg (195 lb 11.2 oz)   11/01/18 87.5 kg (192 lb 12.8 oz)   10/25/18 86.6 kg (191 lb)              We Performed the Following     Follow-Up with Cardiologist        Primary Care Provider Office Phone # Fax #    Mick MAMTA Torres [phone]       [place] [address]        Equal Access to Services     HALEY LARA : Hadii aad ku hadasho Soomaali, waaxda luqadaha, qaybta kaalmada adeegyada, waxay susie haygermaine mazariegos. So [facility] [phone].    ATENCIÓN: Si habla español, tiene a evans disposición servicios gratuitos de asistencia lingüística. Tello al [phone].    We comply with applicable federal civil rights laws and [place] laws. We do not discriminate on the basis of race, color, national origin, age, disability, sex, sexual orientation, or gender identity.            Thank you!     Thank you for choosing [facility]  for your care. Our goal is always to provide you with excellent care. Hearing back from our " patients is one way we can continue to improve our services. Please take a few minutes to complete the written survey that you may receive in the mail after your visit with us. Thank you!             Your Updated Medication List - Protect others around you: Learn how to safely use, store and throw away your medicines at www.disposemymeds.org.          This list is accurate as of 11/26/18  4:07 PM.  Always use your most recent med list.                   Brand Name Dispense Instructions for use Diagnosis    aspirin 325 MG tablet    ASA     Take 325 mg by mouth daily        CARTIA  MG 24 hr capsule   Generic drug:  diltiazem      Take by mouth daily        DOXAZOSIN MESYLATE PO      Take 4 mg by mouth Take 1/2 tablet at bed time        METFORMIN HCL PO      Take 500 mg by mouth Take  1/2 tablet daily        Metoprolol Succinate 25 MG Cs24      Take 25 mg by mouth        rosuvastatin 20 MG tablet    CRESTOR     Take 20 mg by mouth daily        SUPER B COMPLEX MAXI PO           VITAMIN D (CHOLECALCIFEROL) PO      Take 2,000 Units by mouth daily        vitamin E 400 UNIT capsule    TOCOPHEROL     Take 400 Units by mouth daily

## 2018-11-26 NOTE — LETTER
11/26/2018    MICHELLE WATKINS  [facility] [address] [facility] [address]    RE: Reynaldo Monteiro       Dear Colleague,    I had the pleasure of seeing Reynaldo Monteiro in the [facility].    Clinic visit note dictated. Dictation reference number - [id number]        REVIEW OF SYSTEMS:  A comprehensive 10-point review of systems was completed and the pertinent positives are documented in the history of present illness.    Skin:  Negative     Eyes:  Positive for glasses  ENT:  Negative    Respiratory:  Negative    Cardiovascular:  Negative    Gastroenterology: Negative    Genitourinary:  Positive for urinary frequency  Musculoskeletal:  Positive for back pain  Neurologic:  Positive for stroke  Psychiatric:  Negative    Heme/Lymph/Imm:  Negative    Endocrine:  Positive for diabetes    CURRENT MEDICATIONS:  Current Outpatient Prescriptions   Medication Sig Dispense Refill     aspirin 325 MG tablet Take 325 mg by mouth daily       B Complex-Folic Acid (SUPER B COMPLEX MAXI PO)        diltiazem (CARTIA XT) 240 MG 24 hr capsule Take by mouth daily       DOXAZOSIN MESYLATE PO Take 4 mg by mouth Take 1/2 tablet at bed time       METFORMIN HCL PO Take 500 mg by mouth Take  1/2 tablet daily       Metoprolol Succinate 25 MG CS24 Take 25 mg by mouth       rosuvastatin (CRESTOR) 20 MG tablet Take 20 mg by mouth daily       VITAMIN D, CHOLECALCIFEROL, PO Take 2,000 Units by mouth daily       vitamin E 400 UNIT capsule Take 400 Units by mouth daily           ALLERGIES:  No Known Allergies    PAST MEDICAL HISTORY:    Past Medical History:   Diagnosis Date     Aortic stenosis      Hyperlipidemia      Hypertension      Type 2 diabetes mellitus (H)        PAST SURGICAL HISTORY:    Past Surgical History:   Procedure Laterality Date     ANGIOGRAM      Patient reports coronary angiogram at [place], [place] approx 10 years ago       FAMILY HISTORY:    Family History   Problem Relation Age of Onset      "Hypertension Mother      valve replacement      Coronary Artery Disease Father        SOCIAL HISTORY:    Social History     Social History     Marital status:      Spouse name: N/A     Number of children: N/A     Years of education: N/A     Social History Main Topics     Smoking status: Never Smoker     Smokeless tobacco: Never Used     Alcohol use Yes      Comment: 1-2 a week      Drug use: No     Sexual activity: Not Asked     Other Topics Concern     None     Social History Narrative       PHYSICAL EXAM:    Vitals: /70  Pulse 70  Ht 1.791 m (5' 10.5\")  Wt 88.8 kg (195 lb 11.2 oz)  SpO2 98%  BMI 27.68 kg/m2  Wt Readings from Last 5 Encounters:   11/26/18 88.8 kg (195 lb 11.2 oz)   11/01/18 87.5 kg (192 lb 12.8 oz)   10/25/18 86.6 kg (191 lb)       Thank you for allowing me to participate in the care of your patient.      Sincerely,     Kulwinder Villatoro MD     [facility]    cc:   Kulwinder Villatoro MD  [address] [place] [id number]        "

## 2018-11-26 NOTE — LETTER
11/26/2018      MICHELLE WATKINS  [facility] [address] [facility] [address]      RE: Reynadlo Monteiro       Dear Colleague,    I had the pleasure of seeing Reynaldo Monteiro in the [facility].    Service Date: 11/26/2018      PRIMARY CARE PROVIDER:  Michelle Watkins MD       REASON FOR VISIT:  Followup of aortic valve stenosis with results of a recent transesophageal echocardiogram.      HISTORY OF PRESENT ILLNESS:    Mr. Monteiro is a delightful, 78-year-old, retired  gentleman who was accompanied by his wife.  I had seen him a month ago on 10/25/2018 at the [place] office after a murmur was auscultated, and an echocardiogram showed significant aortic valve stenosis.  Due to discrepancy between the measured mean gradient and valve area and possible degenerative strands on the aortic valve, I had referred him for a transesophageal echocardiogram for clarification.  The patient also has optimally treated hypertension, hyperlipidemia (on rosuvastatin 20 mg daily), type 2 diabetes mellitus (on metformin), BMI 27 kg/m2, lifelong never tobacco user.        Pertinently, the patient has no symptoms and leads a fairly active life.      I personally reviewed the SKYLER images with the patient.  The aortic valve is trileaflet, and the aortic valve stenosis appears to be moderate in severity with reasonable systolic excursion of the valve leaflets.  There is mild aortic regurgitation.  Preserved biventricular systolic function.      Labs:  Total cholesterol 98, HDL 41, LDL 40, triglycerides 86.  Sodium 141, potassium 4.3, BUN 15, creatinine 0.8 with an estimated GFR of 84.      PHYSICAL EXAMINATION:   VITAL SIGNS:  /70, pulse 70 per minute and regular, height 1.8 m, weight 88.8 kg, BMI 27.7 kg/m2.   I reauscultated the patient today.  It is consistent with my findings a few weeks ago.   CARDIOVASCULAR:  Normal first heart sound, audible second heart sound, a late peaking 3/6 ejection  systolic murmur radiating to both carotids.   LUNGS:  Clear.   EXTREMITIES:  No edema.      DIAGNOSES:   1.  Moderate aortic valve stenosis, asymptomatic.   2.  Hypertension, optimally treated.   3.  Type 2 diabetes mellitus, on oral agents.   4.  Hyperlipidemia (LDL 40 on 20 mg of rosuvastatin).      ASSESSMENT/PLAN:    His echocardiogram images show that the aortic valve is moderate severity with mild regurgitation.  The presence of the regurgitation might explain the higher mean gradient than would be expected with a valve area of 1.2 cm2.  The patient's clinical exam is also consistent with moderate aortic valve stenosis.  He is asymptomatic and states he has an active life, and I would like to objectify this with a treadmill stress test.   1.  Treadmill stress test to assess symptomatic status within the context of moderate AS.  I advised him to hold metoprolol on the day of and day before test.  My office will call him with the results.   2.  I will follow up with him in 6 months.  I counseled him about the symptoms of aortic valve stenosis, natural history of the disease and when we would proceed to intervention.      It was my pleasure to visit with Butch and his wife.  I look forward to seeing them again.       cc:   Mick Torres MD   [address] [address]         [facility] JEREMIE LI MD             D: 2018   T: 2018   MT: [email or web address]      Name:     BUTCH MANUEL   MRN:      [id number]        Account:      [id number]   :      1940           Service Date: 2018      Document: [id number]           Outpatient Encounter Prescriptions as of 2018   Medication Sig Dispense Refill     aspirin 325 MG tablet Take 325 mg by mouth daily       B Complex-Folic Acid (SUPER B COMPLEX MAXI PO)        diltiazem (CARTIA XT) 240 MG 24 hr capsule Take by mouth daily       DOXAZOSIN MESYLATE PO Take 4 mg by mouth Take 1/2 tablet at bed time       METFORMIN HCL PO  Take 500 mg by mouth Take  1/2 tablet daily       Metoprolol Succinate 25 MG CS24 Take 25 mg by mouth       rosuvastatin (CRESTOR) 20 MG tablet Take 20 mg by mouth daily       VITAMIN D, CHOLECALCIFEROL, PO Take 2,000 Units by mouth daily       vitamin E 400 UNIT capsule Take 400 Units by mouth daily       No facility-administered encounter medications on file as of 11/26/2018.        Again, thank you for allowing me to participate in the care of your patient.      Sincerely,    Kulwinder Villatoro MD     [facility]

## 2018-11-26 NOTE — PROGRESS NOTES
Clinic visit note dictated. Dictation reference number - [id number]        REVIEW OF SYSTEMS:  A comprehensive 10-point review of systems was completed and the pertinent positives are documented in the history of present illness.    Skin:  Negative     Eyes:  Positive for glasses  ENT:  Negative    Respiratory:  Negative    Cardiovascular:  Negative    Gastroenterology: Negative    Genitourinary:  Positive for urinary frequency  Musculoskeletal:  Positive for back pain  Neurologic:  Positive for stroke  Psychiatric:  Negative    Heme/Lymph/Imm:  Negative    Endocrine:  Positive for diabetes    CURRENT MEDICATIONS:  Current Outpatient Prescriptions   Medication Sig Dispense Refill     aspirin 325 MG tablet Take 325 mg by mouth daily       B Complex-Folic Acid (SUPER B COMPLEX MAXI PO)        diltiazem (CARTIA XT) 240 MG 24 hr capsule Take by mouth daily       DOXAZOSIN MESYLATE PO Take 4 mg by mouth Take 1/2 tablet at bed time       METFORMIN HCL PO Take 500 mg by mouth Take  1/2 tablet daily       Metoprolol Succinate 25 MG CS24 Take 25 mg by mouth       rosuvastatin (CRESTOR) 20 MG tablet Take 20 mg by mouth daily       VITAMIN D, CHOLECALCIFEROL, PO Take 2,000 Units by mouth daily       vitamin E 400 UNIT capsule Take 400 Units by mouth daily           ALLERGIES:  No Known Allergies    PAST MEDICAL HISTORY:    Past Medical History:   Diagnosis Date     Aortic stenosis      Hyperlipidemia      Hypertension      Type 2 diabetes mellitus (H)        PAST SURGICAL HISTORY:    Past Surgical History:   Procedure Laterality Date     ANGIOGRAM      Patient reports coronary angiogram at [place], [place] approx 10 years ago       FAMILY HISTORY:    Family History   Problem Relation Age of Onset     Hypertension Mother      valve replacement      Coronary Artery Disease Father        SOCIAL HISTORY:    Social History     Social History     Marital status:      Spouse name: N/A     Number of children: N/A     Years of  "education: N/A     Social History Main Topics     Smoking status: Never Smoker     Smokeless tobacco: Never Used     Alcohol use Yes      Comment: 1-2 a week      Drug use: No     Sexual activity: Not Asked     Other Topics Concern     None     Social History Narrative       PHYSICAL EXAM:    Vitals: /70  Pulse 70  Ht 1.791 m (5' 10.5\")  Wt 88.8 kg (195 lb 11.2 oz)  SpO2 98%  BMI 27.68 kg/m2  Wt Readings from Last 5 Encounters:   11/26/18 88.8 kg (195 lb 11.2 oz)   11/01/18 87.5 kg (192 lb 12.8 oz)   10/25/18 86.6 kg (191 lb)       "

## 2018-11-26 NOTE — PATIENT INSTRUCTIONS
1.  No changes to medications today.    2.  Treadmill exercise test.  Hold the metoprolol on the day off and 24 hours prior to the stress test.  My office will call you with the results.    3.  Follow-up in 6 months with a previsit heart ultrasound (transthoracic echocardiogram).  If you get any of the symptoms we discussed, contact my office for an earlier appointment.    If you have any questions or concerns, please contact my nurses at [phone].

## 2018-11-29 ENCOUNTER — HOSPITAL ENCOUNTER (OUTPATIENT)
Dept: CARDIOLOGY | Facility: CLINIC | Age: 78
End: 2018-11-29
Attending: INTERNAL MEDICINE
Payer: MEDICARE

## 2018-11-29 DIAGNOSIS — E11.9 TYPE 2 DIABETES MELLITUS WITHOUT COMPLICATION, WITHOUT LONG-TERM CURRENT USE OF INSULIN (H): ICD-10-CM

## 2018-11-29 DIAGNOSIS — I10 BENIGN ESSENTIAL HYPERTENSION: ICD-10-CM

## 2018-11-29 DIAGNOSIS — I35.0 NONRHEUMATIC AORTIC VALVE STENOSIS: ICD-10-CM

## 2018-11-29 PROCEDURE — 93016 CV STRESS TEST SUPVJ ONLY: CPT | Performed by: INTERNAL MEDICINE

## 2018-11-29 PROCEDURE — 93018 CV STRESS TEST I&R ONLY: CPT | Performed by: INTERNAL MEDICINE

## 2018-12-03 ENCOUNTER — TELEPHONE (OUTPATIENT)
Dept: CARDIOLOGY | Facility: CLINIC | Age: 78
End: 2018-12-03

## 2018-12-03 ENCOUNTER — DOCUMENTATION ONLY (OUTPATIENT)
Dept: CARDIOLOGY | Facility: CLINIC | Age: 78
End: 2018-12-03

## 2018-12-03 NOTE — PROGRESS NOTES
Patient underwent a treadmill exercise test.  It was negative for ischemia, arrhythmia, or concerning symptoms at a high workload (9 minutes on Jesus protocol, 10.0 METS).  Normal blood pressure response. Test was stopped due to fatigue.      RECOMMENDATIONS:  My nursing team will update the patient.  This is a reassuring stress test that is consistent with my clinical impression/SKYLER findings that the patient has moderate aortic valve stenosis.    I plan on seeing him back in clinic with a follow-up transthoracic echocardiogram, in 6 months.    Dr. NICOLETTE Villatoro MD [facility]  Cardiology

## 2018-12-03 NOTE — TELEPHONE ENCOUNTER
"Kulwinder Villatoro MD  [facility] Heart Team 2                     Please update the patient that the stress test is reassuring.  In keeping with my impression that his aortic valve stenosis is moderate.  I will plan on seeing him back in my clinic in 6 months, with a previsit transthoracic echocardiogram.  Please see my separate documentation note.     Dr. NICOLETTE Villatoro MD [facility]   Cardiology       Called patient and informed him of stress test results, which was \"negative for ischemia, arrhythmia, or concerning symptoms at a high workload\" per Dr Villatoro's note 12/3/18. Instructed patient to follow up in 6mos with repeat echo. Pt verbalized understanding.   "

## 2019-04-17 ENCOUNTER — TRANSFERRED RECORDS (OUTPATIENT)
Dept: HEALTH INFORMATION MANAGEMENT | Facility: CLINIC | Age: 79
End: 2019-04-17

## 2019-04-17 LAB
ALBUMIN SERPL-MCNC: 4.7 G/DL
ALP SERPL-CCNC: 88 U/L
ALT SERPL-CCNC: 23 U/L
ANION GAP SERPL CALCULATED.3IONS-SCNC: NORMAL MMOL/L
AST SERPL-CCNC: 23 U/L
BILIRUB SERPL-MCNC: 0.5 MG/DL
BUN SERPL-MCNC: 18 MG/DL
CALCIUM SERPL-MCNC: 9.2 MG/DL
CHLORIDE SERPLBLD-SCNC: 104 MMOL/L
CHOLEST SERPL-MCNC: 96 MG/DL
CO2 SERPL-SCNC: 28 MMOL/L
CREAT SERPL-MCNC: 0.87 MG/DL
GFR SERPL CREATININE-BSD FRML MDRD: 83 ML/MIN/1.73M2
GLUCOSE SERPL-MCNC: 88 MG/DL (ref 70–99)
HDLC SERPL-MCNC: 39 MG/DL
LDLC SERPL CALC-MCNC: 37 MG/DL
NONHDLC SERPL-MCNC: 57 MG/DL
POTASSIUM SERPL-SCNC: 4.4 MMOL/L
PROT SERPL-MCNC: 7 G/DL
SODIUM SERPL-SCNC: 139 MMOL/L
TRIGL SERPL-MCNC: 112 MG/DL

## 2019-09-13 ENCOUNTER — PRE VISIT (OUTPATIENT)
Dept: CARDIOLOGY | Facility: CLINIC | Age: 79
End: 2019-09-13

## 2019-09-13 DIAGNOSIS — I25.10 ATHEROSCLEROTIC HEART DISEASE: ICD-10-CM

## 2019-09-18 NOTE — TELEPHONE ENCOUNTER
Records received from [facility], CMP & FLP drawn at last visit, labs entered to Epic, documents sent to scan.

## 2019-10-03 ENCOUNTER — OFFICE VISIT (OUTPATIENT)
Dept: CARDIOLOGY | Facility: CLINIC | Age: 79
End: 2019-10-03
Attending: INTERNAL MEDICINE
Payer: MEDICARE

## 2019-10-03 ENCOUNTER — HOSPITAL ENCOUNTER (OUTPATIENT)
Dept: CARDIOLOGY | Facility: CLINIC | Age: 79
Discharge: HOME | End: 2019-10-03
Attending: INTERNAL MEDICINE | Admitting: INTERNAL MEDICINE
Payer: MEDICARE

## 2019-10-03 VITALS
DIASTOLIC BLOOD PRESSURE: 62 MMHG | BODY MASS INDEX: 28 KG/M2 | OXYGEN SATURATION: 97 % | WEIGHT: 200 LBS | SYSTOLIC BLOOD PRESSURE: 118 MMHG | HEART RATE: 72 BPM | HEIGHT: 71 IN

## 2019-10-03 DIAGNOSIS — I10 BENIGN ESSENTIAL HYPERTENSION: ICD-10-CM

## 2019-10-03 DIAGNOSIS — E11.9 TYPE 2 DIABETES MELLITUS WITHOUT COMPLICATION, WITHOUT LONG-TERM CURRENT USE OF INSULIN (H): ICD-10-CM

## 2019-10-03 DIAGNOSIS — E78.5 HYPERLIPIDEMIA LDL GOAL <70: ICD-10-CM

## 2019-10-03 DIAGNOSIS — I35.0 NONRHEUMATIC AORTIC VALVE STENOSIS: ICD-10-CM

## 2019-10-03 DIAGNOSIS — I35.0 MODERATE AORTIC STENOSIS: Primary | ICD-10-CM

## 2019-10-03 LAB
BASOPHILS # BLD AUTO: 0 10E9/L (ref 0–0.2)
BASOPHILS NFR BLD AUTO: 0.4 %
DIFFERENTIAL METHOD BLD: NORMAL
EOSINOPHIL # BLD AUTO: 0.1 10E9/L (ref 0–0.7)
EOSINOPHIL NFR BLD AUTO: 1.3 %
ERYTHROCYTE [DISTWIDTH] IN BLOOD BY AUTOMATED COUNT: 12.8 % (ref 10–15)
HCT VFR BLD AUTO: 45.1 % (ref 40–53)
HGB BLD-MCNC: 15.1 G/DL (ref 13.3–17.7)
IMM GRANULOCYTES # BLD: 0 10E9/L (ref 0–0.4)
IMM GRANULOCYTES NFR BLD: 0.4 %
LYMPHOCYTES # BLD AUTO: 1.6 10E9/L (ref 0.8–5.3)
LYMPHOCYTES NFR BLD AUTO: 23.4 %
MCH RBC QN AUTO: 30.6 PG (ref 26.5–33)
MCHC RBC AUTO-ENTMCNC: 33.5 G/DL (ref 31.5–36.5)
MCV RBC AUTO: 91 FL (ref 78–100)
MONOCYTES # BLD AUTO: 0.6 10E9/L (ref 0–1.3)
MONOCYTES NFR BLD AUTO: 9.1 %
NEUTROPHILS # BLD AUTO: 4.5 10E9/L (ref 1.6–8.3)
NEUTROPHILS NFR BLD AUTO: 65.4 %
NRBC # BLD AUTO: 0 10*3/UL
NRBC BLD AUTO-RTO: 0 /100
PLATELET # BLD AUTO: 164 10E9/L (ref 150–450)
RBC # BLD AUTO: 4.94 10E12/L (ref 4.4–5.9)
WBC # BLD AUTO: 6.8 10E9/L (ref 4–11)

## 2019-10-03 PROCEDURE — 99214 OFFICE O/P EST MOD 30 MIN: CPT | Performed by: INTERNAL MEDICINE

## 2019-10-03 PROCEDURE — 93000 ELECTROCARDIOGRAM COMPLETE: CPT | Performed by: INTERNAL MEDICINE

## 2019-10-03 PROCEDURE — 85025 COMPLETE CBC W/AUTO DIFF WBC: CPT | Performed by: INTERNAL MEDICINE

## 2019-10-03 PROCEDURE — 36415 COLL VENOUS BLD VENIPUNCTURE: CPT | Performed by: INTERNAL MEDICINE

## 2019-10-03 PROCEDURE — 93306 TTE W/DOPPLER COMPLETE: CPT

## 2019-10-03 PROCEDURE — 93306 TTE W/DOPPLER COMPLETE: CPT | Mod: 26 | Performed by: INTERNAL MEDICINE

## 2019-10-03 RX ORDER — ASPIRIN 81 MG/1
81 TABLET, CHEWABLE ORAL DAILY
Qty: 100 TABLET | Refills: 3 | COMMUNITY
Start: 2019-10-03 | End: 2022-04-07

## 2019-10-03 ASSESSMENT — MIFFLIN-ST. JEOR: SCORE: 1646.38

## 2019-10-03 NOTE — PATIENT INSTRUCTIONS
MEDICATION CHANGES:  1.  Stop the medication called doxazosin.  2.  Cut down aspirin from 325 mg to 81 mg daily.  In other words, take a daily baby aspirin (over-the-counter).    FOLLOW-UP:  1.  I will plan on seeing you in 6 months with a repeat heart ultrasound.  2.  As discussed, if you develop symptoms before that call my office at the number below.    If you have any questions or concerns, please contact my nurses at [phone].

## 2019-10-03 NOTE — LETTER
10/3/2019    MICHELLE WATKINS  [facility] [address] [facility] [address]    RE: Reynaldo Monteiro       Dear Colleague,    I had the pleasure of seeing Reynaldo Monteiro in the [facility].    Clinic visit note dictated. Dictation reference number - [id number]        REVIEW OF SYSTEMS:  A comprehensive 10-point review of systems was completed and the pertinent positives are documented in the history of present illness.    Skin:  Negative     Eyes:  Positive for glasses;cataracts  ENT:  Negative    Respiratory:  Negative    Cardiovascular:  Negative    Gastroenterology: Negative    Genitourinary:  Positive for urinary frequency  Musculoskeletal:  Positive for back pain;arthritis;joint pain  Neurologic:  Positive for numbness or tingling of feet  Psychiatric:  Negative    Heme/Lymph/Imm:  Negative    Endocrine:  Positive for diabetes    CURRENT MEDICATIONS:  Current Outpatient Medications   Medication Sig Dispense Refill     aspirin (ASA) 81 MG chewable tablet Take 1 tablet (81 mg) by mouth daily 100 tablet 3     B Complex-Folic Acid (SUPER B COMPLEX MAXI PO)        diltiazem (CARTIA XT) 240 MG 24 hr capsule Take by mouth daily       Metoprolol Succinate 25 MG CS24 Take 25 mg by mouth       rosuvastatin (CRESTOR) 20 MG tablet Take 20 mg by mouth daily       VITAMIN D, CHOLECALCIFEROL, PO Take 2,000 Units by mouth daily       vitamin E 400 UNIT capsule Take 400 Units by mouth daily           ALLERGIES:  No Known Allergies    PAST MEDICAL HISTORY:    Past Medical History:   Diagnosis Date     Aortic stenosis      Hyperlipidemia      Hypertension      Type 2 diabetes mellitus (H)        PAST SURGICAL HISTORY:    Past Surgical History:   Procedure Laterality Date     ANGIOGRAM      Patient reports coronary angiogram at [place], [place] approx 10 years ago       FAMILY HISTORY:    Family History   Problem Relation Age of Onset     Hypertension Mother         valve replacement       "Coronary Artery Disease Father        SOCIAL HISTORY:    Social History     Socioeconomic History     Marital status:      Spouse name: None     Number of children: None     Years of education: None     Highest education level: None   Occupational History     None   Social Needs     Financial resource strain: None     Food insecurity:     Worry: None     Inability: None     Transportation needs:     Medical: None     Non-medical: None   Tobacco Use     Smoking status: Never Smoker     Smokeless tobacco: Never Used   Substance and Sexual Activity     Alcohol use: Yes     Comment: 1-2 a week      Drug use: No     Sexual activity: None   Lifestyle     Physical activity:     Days per week: None     Minutes per session: None     Stress: None   Relationships     Social connections:     Talks on phone: None     Gets together: None     Attends Quaker service: None     Active member of club or organization: None     Attends meetings of clubs or organizations: None     Relationship status: None     Intimate partner violence:     Fear of current or ex partner: None     Emotionally abused: None     Physically abused: None     Forced sexual activity: None   Other Topics Concern     Parent/sibling w/ CABG, MI or angioplasty before 65F 55M? Not Asked   Social History Narrative     None       PHYSICAL EXAM:    Vitals: /62 (BP Location: Right arm, Patient Position: Sitting, Cuff Size: Adult Large)   Pulse 72   Ht 1.791 m (5' 10.5\")   Wt 90.7 kg (200 lb)   SpO2 97%   BMI 28.29 kg/m     Wt Readings from Last 5 Encounters:   10/03/19 90.7 kg (200 lb)   11/26/18 88.8 kg (195 lb 11.2 oz)   11/01/18 87.5 kg (192 lb 12.8 oz)   10/25/18 86.6 kg (191 lb)           Encounter Diagnoses   Name Primary?     Moderate aortic stenosis Yes     Benign essential hypertension      Type 2 diabetes mellitus without complication, without long-term current use of insulin (H)      Hyperlipidemia LDL goal <70        Orders Placed This " Encounter   Procedures     Follow-Up with Cardiologist     Echocardiogram Complete               Thank you for allowing me to participate in the care of your patient.      Sincerely,     Kulwinder Villatoro MD     [facility]    cc:   Kulwinder Villatoro MD  [address] [place] [id number]

## 2019-10-03 NOTE — PROGRESS NOTES
Clinic visit note dictated. Dictation reference number - [id number]        REVIEW OF SYSTEMS:  A comprehensive 10-point review of systems was completed and the pertinent positives are documented in the history of present illness.    Skin:  Negative     Eyes:  Positive for glasses;cataracts  ENT:  Negative    Respiratory:  Negative    Cardiovascular:  Negative    Gastroenterology: Negative    Genitourinary:  Positive for urinary frequency  Musculoskeletal:  Positive for back pain;arthritis;joint pain  Neurologic:  Positive for numbness or tingling of feet  Psychiatric:  Negative    Heme/Lymph/Imm:  Negative    Endocrine:  Positive for diabetes    CURRENT MEDICATIONS:  Current Outpatient Medications   Medication Sig Dispense Refill     aspirin (ASA) 81 MG chewable tablet Take 1 tablet (81 mg) by mouth daily 100 tablet 3     B Complex-Folic Acid (SUPER B COMPLEX MAXI PO)        diltiazem (CARTIA XT) 240 MG 24 hr capsule Take by mouth daily       Metoprolol Succinate 25 MG CS24 Take 25 mg by mouth       rosuvastatin (CRESTOR) 20 MG tablet Take 20 mg by mouth daily       VITAMIN D, CHOLECALCIFEROL, PO Take 2,000 Units by mouth daily       vitamin E 400 UNIT capsule Take 400 Units by mouth daily           ALLERGIES:  No Known Allergies    PAST MEDICAL HISTORY:    Past Medical History:   Diagnosis Date     Aortic stenosis      Hyperlipidemia      Hypertension      Type 2 diabetes mellitus (H)        PAST SURGICAL HISTORY:    Past Surgical History:   Procedure Laterality Date     ANGIOGRAM      Patient reports coronary angiogram at [place], [place] approx 10 years ago       FAMILY HISTORY:    Family History   Problem Relation Age of Onset     Hypertension Mother         valve replacement      Coronary Artery Disease Father        SOCIAL HISTORY:    Social History     Socioeconomic History     Marital status:      Spouse name: None     Number of children: None     Years of education: None     Highest education level:  "None   Occupational History     None   Social Needs     Financial resource strain: None     Food insecurity:     Worry: None     Inability: None     Transportation needs:     Medical: None     Non-medical: None   Tobacco Use     Smoking status: Never Smoker     Smokeless tobacco: Never Used   Substance and Sexual Activity     Alcohol use: Yes     Comment: 1-2 a week      Drug use: No     Sexual activity: None   Lifestyle     Physical activity:     Days per week: None     Minutes per session: None     Stress: None   Relationships     Social connections:     Talks on phone: None     Gets together: None     Attends Caodaism service: None     Active member of club or organization: None     Attends meetings of clubs or organizations: None     Relationship status: None     Intimate partner violence:     Fear of current or ex partner: None     Emotionally abused: None     Physically abused: None     Forced sexual activity: None   Other Topics Concern     Parent/sibling w/ CABG, MI or angioplasty before 65F 55M? Not Asked   Social History Narrative     None       PHYSICAL EXAM:    Vitals: /62 (BP Location: Right arm, Patient Position: Sitting, Cuff Size: Adult Large)   Pulse 72   Ht 1.791 m (5' 10.5\")   Wt 90.7 kg (200 lb)   SpO2 97%   BMI 28.29 kg/m    Wt Readings from Last 5 Encounters:   10/03/19 90.7 kg (200 lb)   11/26/18 88.8 kg (195 lb 11.2 oz)   11/01/18 87.5 kg (192 lb 12.8 oz)   10/25/18 86.6 kg (191 lb)           Encounter Diagnoses   Name Primary?     Moderate aortic stenosis Yes     Benign essential hypertension      Type 2 diabetes mellitus without complication, without long-term current use of insulin (H)      Hyperlipidemia LDL goal <70        Orders Placed This Encounter   Procedures     Follow-Up with Cardiologist     Echocardiogram Complete             "

## 2019-10-03 NOTE — PROGRESS NOTES
Service Date: 10/03/2019      PRIMARY CARE PROVIDER:  Mick Torres MD       REASON FOR VISIT:  Scheduled followup of moderately severe aortic valve stenosis, asymptomatic.      HISTORY OF PRESENT ILLNESS:    Reynaldo Monteiro and his accompanying wife are both known to me from previous visits.  Reynaldo is a delightful 79-year-old  gentleman with a wry sense of humor and tends to be stoic and underplay his symptoms.      Medical history significant for:  1.  Moderately severe aortic valve stenosis.  Confirmed with transesophageal echocardiogram due to discrepancy between the valve area and mean gradient.  He also had a reassuring stress test of 9 minutes on the Jesus protocol confirming asymptomatic status.   2.  Type 2 diabetes mellitus.  Recently, metformin stopped and is being managed with diet alone.   3.  Hypertension.   4.  Optimally treated hyperlipidemia, on 20 mg of rosuvastatin.  Total cholesterol 96, HDL 39, LDL 37, triglycerides 112.    5.  Lifelong never-tobacco user.      Reynaldo remains healthy.  He still drives a truck part time, does lots of projects around the house.  Denies chest pain or dyspnea.  He has chronic fatigue that does not limit his lifestyle.      Labs:  Lipid panel as above.  Creatinine 0.87.  Hemoglobin 15.1.      ECG done today shows sinus rhythm with right bundle branch block and left axis.  Sinus rate 77 BPM, QTc 435 milliseconds.      Repeat transthoracic echocardiogram.  Reviewed images.  Normal LV size and function.  LVEF 60%-65%, normal RV size and function.  Moderately severe aortic valve stenosis with a peak velocity of 3.8 meters per second, mean gradient 33 mmHg, valve area 1.3 cm2.  Compared to his previous study, his peak velocity has gone up from 3.3 to 3.8 meters per second.      PHYSICAL EXAMINATION:     GENERAL:  He is alert and oriented.  Normal body habitus.    CARDIOVASCULAR:  Normal first and second heart sound, normal carotid upstroke, pin-sr-brok-peaking ejection  systolic murmur radiating to both carotids.   LUNGS:  Clear.   EXTREMITIES:  No lower extremity edema.   ABDOMEN:  Soft and nontender.      DIAGNOSIS, ASSESSMENT, PLAN:   1. Moderately severe aortic valve stenosis, asymptomatic.   The patient is asymptomatic.  Echo hemodynamics suggest some progression.  Clinically, second heart sound is audible and he continues to lead an active lifestyle.  I suspect, however, that he will require a valve replacement in the near future.      PLAN:     1.  Stop doxazosin 2 mg in the evening.  His blood pressure is 118/62.     2.  I note he is on dual AV ana-blocking agents, diltiazem and metoprolol.  Downstream, I might consider stopping the metoprolol.  For now, I do not want to make too many medication changes.   3.  Cut back aspirin dosage from 325 mg to 81 mg.   4.  Follow up with me in 6 months with pre-visit echocardiogram.  If he develops symptoms before that, he will contact my office.      cc:   Mick Torres MD    [address] JEREMIE LI MD             D: 10/03/2019   T: 10/03/2019   MT: GARO      Name:     BUTCH MANUEL   MRN:      [id number]        Account:      [id number]   :      1940           Service Date: 10/03/2019      Document: [id number]

## 2019-10-03 NOTE — LETTER
10/3/2019      MICHELLE WATKINS  [facility] [address] [facility] [address]      RE: Reynaldo Monteiro       Dear Colleague,    I had the pleasure of seeing Reynaldo Monteiro in the [facility].    Service Date: 10/03/2019      PRIMARY CARE PROVIDER:  Michelle Watkins MD       REASON FOR VISIT:  Scheduled followup of moderately severe aortic valve stenosis, asymptomatic.      HISTORY OF PRESENT ILLNESS:  Reynaldo Monteiro and his accompanying wife are both known to me from previous visits.  Reynaldo is a delightful 79-year-old  gentleman with a wry sense of humor and tends to be stoic and underplay his symptoms.      MEDICAL HISTORY:  Significant for:   1.  Moderately severe aortic valve stenosis.  Confirmed with transesophageal echocardiogram due to discrepancy between the valve area and mean gradient.  He also had a reassuring stress test of 9 minutes on the Jesus protocol confirming asymptomatic status.   2.  Type 2 diabetes mellitus.  Recently, metformin stopped and is being managed with diet alone.   3.  Hypertension.   4.  Optimally treated hyperlipidemia, on 20 mg of rosuvastatin.  Total cholesterol 96, HDL 39, LDL 37, triglycerides 112.     5.  Lifelong never-tobacco user.      Reynaldo remains healthy.  He still drives a truck part time, does lots of projects around the house.  Denies chest pain or dyspnea.  He has chronic fatigue that does not limit his lifestyle.      LABORATORIES:  Lipid panel as above.  Creatinine 0.87.  Hemoglobin 15.1.      RADIOLOGIC STUDIES:  ECG done today shows sinus rhythm with right bundle branch block and left axis.  Sinus rate 77 BPM, QTc 435 milliseconds.      Repeat transthoracic echocardiogram.  Reviewed images.  Normal LV size and function.  LVEF 60%-65%, normal RV size and function.  Moderately severe aortic valve stenosis with a peak velocity of 3.8 meters per second, mean gradient 33 mmHg, valve area 1.3 cm2.  Compared to his previous study,  his peak velocity has gone up from 3.3 to 3.8 meters per second.      PHYSICAL EXAMINATION:     GENERAL:  He is alert and oriented.  Normal body habitus.    CARDIOVASCULAR:  Normal first and second heart sound, normal carotid upstroke, kpf-ct-rqha-peaking ejection systolic murmur radiating to both carotids.   LUNGS:  Clear.   EXTREMITIES:  No lower extremity edema.   ABDOMEN:  Soft and nontender.      DIAGNOSES, ASSESSMENT:   Moderately severe aortic valve stenosis, asymptomatic.      The patient is asymptomatic.  Echo hemodynamics suggest a mild progression.  Clinically, second heart sound is audible and he continues to lead an active lifestyle.  I suspect, however, that he will require a valve replacement in the next few months to in the near future.      PLAN:     1.  Stop doxazosin 2 mg in the evening.  His blood pressure is 118/62.     2.  I note he is on dual AV ana-blocking agents, diltiazem and metoprolol.  Downstream, I might consider stopping the metoprolol.  For now, I do not want to make too many medication changes.   3.  Cut back aspirin dosage from 325 mg to 81 mg.   4.  Follow up with me in 6 months with pre-visit echocardiogram.  If he develops symptoms before that, he will contact my office.      cc:   Mick Torres MD    [address] JEREMIE LI MD             D: 10/03/2019   T: 10/03/2019   MT: GARO      Name:     BUTCH MANUEL   MRN:      [id number]        Account:      [id number]   :      1940           Service Date: 10/03/2019      Document: [id number]         Outpatient Encounter Medications as of 10/3/2019   Medication Sig Dispense Refill     aspirin (ASA) 81 MG chewable tablet Take 1 tablet (81 mg) by mouth daily 100 tablet 3     B Complex-Folic Acid (SUPER B COMPLEX MAXI PO)        diltiazem (CARTIA XT) 240 MG 24 hr capsule Take by mouth daily       Metoprolol Succinate 25 MG CS24 Take 25 mg by mouth       rosuvastatin  (CRESTOR) 20 MG tablet Take 20 mg by mouth daily       VITAMIN D, CHOLECALCIFEROL, PO Take 2,000 Units by mouth daily       vitamin E 400 UNIT capsule Take 400 Units by mouth daily       [DISCONTINUED] aspirin 325 MG tablet Take 325 mg by mouth daily       [DISCONTINUED] DOXAZOSIN MESYLATE PO Take 4 mg by mouth Take 1/2 tablet at bed time       [DISCONTINUED] METFORMIN HCL PO Take 500 mg by mouth Take  1/2 tablet daily       No facility-administered encounter medications on file as of 10/3/2019.        Again, thank you for allowing me to participate in the care of your patient.      Sincerely,    Kulwinder Villatoro MD     [facility]

## 2019-10-04 ENCOUNTER — DOCUMENTATION ONLY (OUTPATIENT)
Dept: CARDIOLOGY | Facility: CLINIC | Age: 79
End: 2019-10-04

## 2019-10-04 NOTE — PROGRESS NOTES
Faxed signed FILOMENA to [facility] in [facility], [phone] to request a historical angiogram report approximately [phone]. Copy sent to scan

## 2019-12-30 ENCOUNTER — TRANSFERRED RECORDS (OUTPATIENT)
Dept: HEALTH INFORMATION MANAGEMENT | Facility: CLINIC | Age: 79
End: 2019-12-30

## 2020-04-14 ENCOUNTER — PRE VISIT (OUTPATIENT)
Dept: CARDIOLOGY | Facility: CLINIC | Age: 80
End: 2020-04-14

## 2020-04-14 DIAGNOSIS — M51.06 LUMBAR DISC DISORDER WITH MYELOPATHY: ICD-10-CM

## 2020-04-14 NOTE — TELEPHONE ENCOUNTER
Request for records update faxed to PCP clinic    4/15/2020 received PCP records for 12/30/19, copy sent to scan

## 2020-04-23 ENCOUNTER — VIRTUAL VISIT (OUTPATIENT)
Dept: CARDIOLOGY | Facility: CLINIC | Age: 80
End: 2020-04-23
Attending: INTERNAL MEDICINE
Payer: MEDICARE

## 2020-04-23 DIAGNOSIS — I10 BENIGN ESSENTIAL HYPERTENSION: ICD-10-CM

## 2020-04-23 DIAGNOSIS — I35.0 MODERATE AORTIC STENOSIS: Primary | ICD-10-CM

## 2020-04-23 DIAGNOSIS — E78.5 HYPERLIPIDEMIA LDL GOAL <70: ICD-10-CM

## 2020-04-23 DIAGNOSIS — E11.9 TYPE 2 DIABETES MELLITUS WITHOUT COMPLICATION, WITHOUT LONG-TERM CURRENT USE OF INSULIN (H): ICD-10-CM

## 2020-04-23 PROCEDURE — 99442 ZZC PHYSICIAN TELEPHONE EVALUATION 11-20 MIN: CPT | Performed by: INTERNAL MEDICINE

## 2020-04-23 RX ORDER — SODIUM PHOSPHATE,MONO-DIBASIC 19G-7G/118
500 ENEMA (ML) RECTAL DAILY
COMMUNITY

## 2020-04-23 NOTE — PROGRESS NOTES
"Reynaldo Monteiro is a 80 year old male who is being evaluated via a billable telephone visit.      Dictation ID [id number]    The patient has been notified of following:     \"This telephone visit will be conducted via a call between you and your physician/provider. We have found that certain health care needs can be provided without the need for a physical exam.  This service lets us provide the care you need with a short phone conversation.  If a prescription is necessary we can send it directly to your pharmacy.  If lab work is needed we can place an order for that and you can then stop by our lab to have the test done at a later time.    Telephone visits are billed at different rates depending on your insurance coverage. During this emergency period, for some insurers they may be billed the same as an in-person visit.  Please reach out to your insurance provider with any questions.    If during the course of the call the physician/provider feels a telephone visit is not appropriate, you will not be charged for this service.\"    Patient has given verbal consent for Telephone visit?  Yes    How would you like to obtain your AVS? Mail a copy      Review Of Systems  Skin: negative  Eyes: glasses for up close, cataract removal in December 2019  Ears/Nose/Throat: negative  Respiratory: No shortness of breath, dyspnea on exertion, cough, or hemoptysis  Cardiovascular: chest pain by the shoulder   Gastrointestinal: negative  Genitourinary: negative  Musculoskeletal: negative  Neurologic: negative  Psychiatric: negative  Hematologic/Lymphatic/Immunologic: negative  Endocrine: negative    Patient reported vitals:  BP: 138/83  Heart rate: 71  Weight: 199 lbs     Symptoms: Per Pt.:   Has gained some weight over the winter.  Pain in both knees started taking glucosamine and has no pain at this time.  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     Pt. Concerns: valve replacement as to when it might be done.    Refills:  None     Mili " SHELLEY Howell    Phone call duration: 14 minutes    Kulwinder Villatoro MD

## 2020-04-23 NOTE — PATIENT INSTRUCTIONS
1.  No changes to medications today.    2.  Echocardiogram (heart ultrasound) to be done next week at [facility].  Scheduling office will contact you.  I will review the results and my nursing team will update you with results.    3.  Follow-up in person with Dr. iVllatoro at [place] in 9 months with pre-visit fasting labs, complete transthoracic echocardiogram and EKG.    If you have any questions or concerns, please contact my nurses at [phone].

## 2020-05-01 ENCOUNTER — HOSPITAL ENCOUNTER (OUTPATIENT)
Dept: CARDIOLOGY | Facility: CLINIC | Age: 80
Discharge: HOME | End: 2020-05-01
Attending: INTERNAL MEDICINE | Admitting: INTERNAL MEDICINE
Payer: MEDICARE

## 2020-05-01 ENCOUNTER — TELEPHONE (OUTPATIENT)
Dept: CARDIOLOGY | Facility: CLINIC | Age: 80
End: 2020-05-01

## 2020-05-01 DIAGNOSIS — I35.0 AORTIC STENOSIS: Primary | ICD-10-CM

## 2020-05-01 DIAGNOSIS — E78.5 HYPERLIPIDEMIA LDL GOAL <70: ICD-10-CM

## 2020-05-01 DIAGNOSIS — I10 BENIGN ESSENTIAL HYPERTENSION: ICD-10-CM

## 2020-05-01 DIAGNOSIS — I35.0 MODERATE AORTIC STENOSIS: ICD-10-CM

## 2020-05-01 PROCEDURE — 93306 TTE W/DOPPLER COMPLETE: CPT

## 2020-05-01 PROCEDURE — 93306 TTE W/DOPPLER COMPLETE: CPT | Mod: 26 | Performed by: INTERNAL MEDICINE

## 2020-05-01 NOTE — TELEPHONE ENCOUNTER
Echo 5-1-2020. Ordered at last Dr. Villatoro visit 4-.  Results  1. The left ventricle is normal in size. The visual ejection fraction is  estimated at 60%.  2. The right ventricle is normal in structure, function and size.  3. There is mild (1+) aortic regurgitation.  4. Moderate to severe valvular aortic stenosis. Mean 29mmHg, Vmax 3.7m/s, ZAKI  1.2cm2, DI 0.27.  5. The ascending aorta is Mildly dilated. 4.2cm.  Echo from 10/2019 showed EF 60%, AS with mean 33mmHg, Vmax 3.8m/s, DI 0.29,  aorta 3.9cm.    Dr. Villatoro to review.

## 2020-05-13 NOTE — TELEPHONE ENCOUNTER
Dr. Villatoro's reply    Please let the patient know that the aortic valve stenosis remains moderately severe.  Since he has no symptoms, the plan is to see him back in my clinic at the [place] office in 6 months with a pre-visit echocardiogram.  If he develops any symptoms before that, he is to contact my office.    Thank you.  Dr. Villatoro

## 2020-05-13 NOTE — TELEPHONE ENCOUNTER
Patient called with echo results.    Patient states he is feeling well. No change in how he is feeling. Denies shortness of breath or light headedness/dizziness.    Patient will call with any concerns.  Order placed for echo and Dr. Irving HARDIN in 6 months.

## 2021-04-08 ENCOUNTER — TELEPHONE (OUTPATIENT)
Dept: CARDIOLOGY | Facility: CLINIC | Age: 81
End: 2021-04-08

## 2021-04-08 DIAGNOSIS — I35.0 AORTIC STENOSIS: ICD-10-CM

## 2021-04-08 DIAGNOSIS — I45.10 RBBB (RIGHT BUNDLE BRANCH BLOCK): ICD-10-CM

## 2021-04-08 DIAGNOSIS — I10 BENIGN ESSENTIAL HYPERTENSION: Primary | ICD-10-CM

## 2021-04-19 ENCOUNTER — HOSPITAL ENCOUNTER (OUTPATIENT)
Dept: CARDIOLOGY | Facility: CLINIC | Age: 81
Discharge: HOME | End: 2021-04-19
Attending: INTERNAL MEDICINE | Admitting: INTERNAL MEDICINE
Payer: MEDICARE

## 2021-04-19 DIAGNOSIS — I35.0 MODERATE AORTIC STENOSIS: ICD-10-CM

## 2021-04-19 DIAGNOSIS — I10 BENIGN ESSENTIAL HYPERTENSION: ICD-10-CM

## 2021-04-19 DIAGNOSIS — E78.5 HYPERLIPIDEMIA LDL GOAL <70: ICD-10-CM

## 2021-04-19 DIAGNOSIS — E11.9 TYPE 2 DIABETES MELLITUS WITHOUT COMPLICATION, WITHOUT LONG-TERM CURRENT USE OF INSULIN (H): ICD-10-CM

## 2021-04-19 PROCEDURE — 93306 TTE W/DOPPLER COMPLETE: CPT | Mod: 26 | Performed by: INTERNAL MEDICINE

## 2021-04-19 PROCEDURE — 93306 TTE W/DOPPLER COMPLETE: CPT

## 2021-04-22 ENCOUNTER — OFFICE VISIT (OUTPATIENT)
Dept: CARDIOLOGY | Facility: CLINIC | Age: 81
End: 2021-04-22
Attending: INTERNAL MEDICINE
Payer: MEDICARE

## 2021-04-22 VITALS
SYSTOLIC BLOOD PRESSURE: 133 MMHG | DIASTOLIC BLOOD PRESSURE: 77 MMHG | HEART RATE: 74 BPM | HEIGHT: 71 IN | OXYGEN SATURATION: 94 % | BODY MASS INDEX: 29.4 KG/M2 | WEIGHT: 210 LBS

## 2021-04-22 DIAGNOSIS — I10 BENIGN ESSENTIAL HYPERTENSION: ICD-10-CM

## 2021-04-22 DIAGNOSIS — E78.5 HYPERLIPIDEMIA LDL GOAL <70: ICD-10-CM

## 2021-04-22 DIAGNOSIS — I35.0 SEVERE AORTIC VALVE STENOSIS: Primary | ICD-10-CM

## 2021-04-22 DIAGNOSIS — E11.9 TYPE 2 DIABETES MELLITUS WITHOUT COMPLICATION, WITHOUT LONG-TERM CURRENT USE OF INSULIN (H): ICD-10-CM

## 2021-04-22 PROCEDURE — 99215 OFFICE O/P EST HI 40 MIN: CPT | Performed by: INTERNAL MEDICINE

## 2021-04-22 PROCEDURE — 93000 ELECTROCARDIOGRAM COMPLETE: CPT | Performed by: INTERNAL MEDICINE

## 2021-04-22 ASSESSMENT — MIFFLIN-ST. JEOR: SCORE: 1681.74

## 2021-04-22 NOTE — PROGRESS NOTES
Service Date: 04/22/2021    PRIMARY CARE PROVIDER:   Dr. Mick Torres MD.    REASON FOR VISIT  1.  Surveillance of moderately severe aortic valve stenosis.    HISTORY OF PRESENT ILLNESS:    It was my pleasure to follow up with Mr. Reynaldo Monteiro.  He is a very pleasant and much younger-appearing 81-year-old  gentleman, unaccompanied today.  Lives with his wife.  He still works part-time driving a truck and is also very physically active, doing many projects, etc.    Notably, Reynaldo is quite stoic and tends to under underplay his symptoms.  He has a wry sense of humor.    His history is significant for:  1.  Aortic valve stenosis.  The severity has been moderately severe as confirmed with a transesophageal echocardiogram due to a discrepancy between valve area and mean gradient.  In the past, he has had a reassuring stress test of 9 minutes on the Jesus protocol without symptoms.  2.  Type 2 diabetes mellitus.  On Jardiance.  3.  Hypertension.  4.  Hyperlipidemia with goal LDL less than 70.  5.  Never tobacco user.    Reynaldo has remained active over the last few months.  He continues to do projects, likes to walk and drives his truck part-time.  However, he feels he is a little bit more fatigued than usual and attributes this to getting older.  He is almost 82 years old.  Specifically, he does not report dyspnea, chest pain or dizzy spells.    DIAGNOSTIC DATA:    I personally reviewed his recent echocardiogram images and believe there is an interval progression in his aortic valve stenosis.  The valve is calcified, systolic excursion is decreased, peak transaortic velocity is 4 m/sec, mean gradient is 39-40 mmHg, and the valve area at the large LVOT diameter of 2.3 cm is 1.2 cm2 with a normal stroke volume index of 50 mL/m2.  Velocity ratio is 0.3.  Indexed valve area is 0.5 cm2/m2.  There is mild concentric left ventricular hypertrophy, normal systolic function, LVEF 60%, grade 1 diastolic function, normal right  ventricular systolic function, moderately dilated left atrium with mild MR.    PHYSICAL EXAMINATION:    GENERAL:  Appears younger than stated age, alert, oriented, with good muscle mass.   VITALS:  /77, pulse 74, weight 210 pounds.  BMI 30 kg/m2.    CARDIOVASCULAR:  Nondisplaced apical impulse.  No parasternal heave.  Normal first heart sound.  Soft second heart sound.  He has a late peaking ejection systolic murmur that radiates to both carotids.    LUNGS:  Clear on auscultation.  EXTREMITIES:  No edema.    DIAGNOSES:  1.  Asymptomatic severe aortic valve stenosis with interval progression.  2.  Other comorbidities listed above.    ASSESSMENT:    The patient's clinical exam and my personal review of his echocardiogram is consistent with interval progression of aortic valve stenosis from moderately severe to severe.  His second heart sound is barely audible, his murmur is late-peaking and shrill, his peak transaortic velocity is 4.0 m/sec with a mean gradient of 40 mmHg.  The discrepancy in valve area is due to a large measured LVOT of 2.3 cm2.  Additionally, he has concentric left ventricular hypertrophy.  I do not see any upside in postponing his valve replacement considerably longer.    I must note that Reynaldo has historically tended to underplay his symptoms and is very stoic.  He himself today stated that he thinks he may be a little bit more fatigued on exertion than usual.  Nevertheless, he is still able to enjoy good exercise capacity without concerning symptoms.    1.  I think it is time to replace his aortic valve, but it is not urgent.  Reynaldo enjoys the outdoors and wants to get through summer first.  I think this is reasonable.  2.  I have referred him to the structural heart clinic with the request that in about 4 months, he undergo the necessary imaging and be seen in the TAVR clinic by my partner, Dr. Edwin Meadows.  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  3.  Once he has had his valve replacement, I will resume care and continue long-term followup.    Total time 45 minutes, greater than 50% spent in counseling and coordination of care.    Kulwinder Villatoro MD        D: 2021   T: 2021   MT: tiera    Name:     BUTCH MANUEL  MRN:      [id number]        Account:      [id number]   :      1940           Service Date: 2021       Document: [id number]

## 2021-04-22 NOTE — PROGRESS NOTES
Clinic visit note dictated. Dictation reference number - [id number]            REVIEW OF SYSTEMS:  A comprehensive 10-point review of systems was completed and the pertinent positives are documented in the history of present illness.    Skin:  Negative     Eyes:  Positive for glasses  ENT:  Negative    Respiratory:  Negative    Cardiovascular:    Positive for;fatigue  Gastroenterology: Negative    Genitourinary:  Positive for nocturia  Musculoskeletal:  Positive for back pain  Neurologic:  Negative    Psychiatric:  Negative    Heme/Lymph/Imm:  Negative    Endocrine:  Positive for diabetes    CURRENT MEDICATIONS:  Current Outpatient Medications   Medication Sig Dispense Refill     aspirin (ASA) 81 MG chewable tablet Take 1 tablet (81 mg) by mouth daily 100 tablet 3     B Complex-Folic Acid (SUPER B COMPLEX MAXI PO)        diltiazem (CARTIA XT) 240 MG 24 hr capsule Take by mouth daily       glucosamine 500 MG CAPS capsule Take 500 mg by mouth daily       JARDIANCE 10 MG TABS tablet        Metoprolol Succinate 25 MG CS24 Take 25 mg by mouth       rosuvastatin (CRESTOR) 20 MG tablet Take 20 mg by mouth daily Pt states he is taking 1/2 a tablet instead       VITAMIN D, CHOLECALCIFEROL, PO Take 2,000 Units by mouth daily       vitamin E 400 UNIT capsule Take 400 Units by mouth daily           ALLERGIES:  No Known Allergies    PAST MEDICAL HISTORY:    Past Medical History:   Diagnosis Date     Aortic valve stenosis      Esophagitis, reflux      Hyperlipidemia      Hypertension      Lumbar disc disorder with myelopathy     bilateral leg weakness     RBBB (right bundle branch block)      Type 2 diabetes mellitus (H)        PAST SURGICAL HISTORY:    Past Surgical History:   Procedure Laterality Date     ANGIOGRAM      Patient reports coronary angiogram at [place], [place] approx 10 years ago       FAMILY HISTORY:    Family History   Problem Relation Age of Onset     Hypertension Mother         valve replacement      Coronary  "Artery Disease Father        SOCIAL HISTORY:    Social History     Socioeconomic History     Marital status:      Spouse name: Karissa     Number of children: None     Years of education: None     Highest education level: None   Occupational History     Occupation: Small business owner   Social Needs     Financial resource strain: None     Food insecurity     Worry: None     Inability: None     Transportation needs     Medical: None     Non-medical: None   Tobacco Use     Smoking status: Never Smoker     Smokeless tobacco: Never Used   Substance and Sexual Activity     Alcohol use: Yes     Comment: 1-2 a week      Drug use: No     Sexual activity: None   Lifestyle     Physical activity     Days per week: None     Minutes per session: None     Stress: None   Relationships     Social connections     Talks on phone: None     Gets together: None     Attends Druze service: None     Active member of club or organization: None     Attends meetings of clubs or organizations: None     Relationship status: None     Intimate partner violence     Fear of current or ex partner: None     Emotionally abused: None     Physically abused: None     Forced sexual activity: None   Other Topics Concern     Parent/sibling w/ CABG, MI or angioplasty before 65F 55M? Not Asked   Social History Narrative     None       PHYSICAL EXAM:    Vitals: /77 (BP Location: Left arm, Patient Position: Sitting, Cuff Size: Adult Large)   Pulse 74   Ht 1.791 m (5' 10.5\")   Wt 95.3 kg (210 lb)   SpO2 94%   BMI 29.71 kg/m    Wt Readings from Last 5 Encounters:   04/22/21 95.3 kg (210 lb)   10/03/19 90.7 kg (200 lb)   11/26/18 88.8 kg (195 lb 11.2 oz)   11/01/18 87.5 kg (192 lb 12.8 oz)   10/25/18 86.6 kg (191 lb)           Encounter Diagnoses   Name Primary?     Severe aortic valve stenosis Yes     Benign essential hypertension      Type 2 diabetes mellitus without complication, without long-term current use of insulin (H)      " Hyperlipidemia LDL goal <70        Orders Placed This Encounter   Procedures     Cardiac Structural Heart Clinic

## 2021-04-22 NOTE — LETTER
4/22/2021    MICHELLE WATKINS  [facility] [address]  [facility] [address]    RE: Reynaldo Monteiro       Dear Colleague,    I had the pleasure of seeing Reynaldo Monteiro in the [facility].    Clinic visit note dictated. Dictation reference number - [id number]            REVIEW OF SYSTEMS:  A comprehensive 10-point review of systems was completed and the pertinent positives are documented in the history of present illness.    Skin:  Negative     Eyes:  Positive for glasses  ENT:  Negative    Respiratory:  Negative    Cardiovascular:    Positive for;fatigue  Gastroenterology: Negative    Genitourinary:  Positive for nocturia  Musculoskeletal:  Positive for back pain  Neurologic:  Negative    Psychiatric:  Negative    Heme/Lymph/Imm:  Negative    Endocrine:  Positive for diabetes    CURRENT MEDICATIONS:  Current Outpatient Medications   Medication Sig Dispense Refill     aspirin (ASA) 81 MG chewable tablet Take 1 tablet (81 mg) by mouth daily 100 tablet 3     B Complex-Folic Acid (SUPER B COMPLEX MAXI PO)        diltiazem (CARTIA XT) 240 MG 24 hr capsule Take by mouth daily       glucosamine 500 MG CAPS capsule Take 500 mg by mouth daily       JARDIANCE 10 MG TABS tablet        Metoprolol Succinate 25 MG CS24 Take 25 mg by mouth       rosuvastatin (CRESTOR) 20 MG tablet Take 20 mg by mouth daily Pt states he is taking 1/2 a tablet instead       VITAMIN D, CHOLECALCIFEROL, PO Take 2,000 Units by mouth daily       vitamin E 400 UNIT capsule Take 400 Units by mouth daily           ALLERGIES:  No Known Allergies    PAST MEDICAL HISTORY:    Past Medical History:   Diagnosis Date     Aortic valve stenosis      Esophagitis, reflux      Hyperlipidemia      Hypertension      Lumbar disc disorder with myelopathy     bilateral leg weakness     RBBB (right bundle branch block)      Type 2 diabetes mellitus (H)        PAST SURGICAL HISTORY:    Past Surgical History:  "  Procedure Laterality Date     ANGIOGRAM      Patient reports coronary angiogram at [place], [place] approx 10 years ago       FAMILY HISTORY:    Family History   Problem Relation Age of Onset     Hypertension Mother         valve replacement      Coronary Artery Disease Father        SOCIAL HISTORY:    Social History     Socioeconomic History     Marital status:      Spouse name: Karissa     Number of children: None     Years of education: None     Highest education level: None   Occupational History     Occupation: Small business owner   Social Needs     Financial resource strain: None     Food insecurity     Worry: None     Inability: None     Transportation needs     Medical: None     Non-medical: None   Tobacco Use     Smoking status: Never Smoker     Smokeless tobacco: Never Used   Substance and Sexual Activity     Alcohol use: Yes     Comment: 1-2 a week      Drug use: No     Sexual activity: None   Lifestyle     Physical activity     Days per week: None     Minutes per session: None     Stress: None   Relationships     Social connections     Talks on phone: None     Gets together: None     Attends Voodoo service: None     Active member of club or organization: None     Attends meetings of clubs or organizations: None     Relationship status: None     Intimate partner violence     Fear of current or ex partner: None     Emotionally abused: None     Physically abused: None     Forced sexual activity: None   Other Topics Concern     Parent/sibling w/ CABG, MI or angioplasty before 65F 55M? Not Asked   Social History Narrative     None       PHYSICAL EXAM:    Vitals: /77 (BP Location: Left arm, Patient Position: Sitting, Cuff Size: Adult Large)   Pulse 74   Ht 1.791 m (5' 10.5\")   Wt 95.3 kg (210 lb)   SpO2 94%   BMI 29.71 kg/m    Wt Readings from Last 5 Encounters:   04/22/21 95.3 kg (210 lb)   10/03/19 90.7 kg (200 lb)   11/26/18 88.8 kg (195 lb 11.2 oz)   11/01/18 87.5 kg (192 lb 12.8 " oz)   10/25/18 86.6 kg (191 lb)           Encounter Diagnoses   Name Primary?     Severe aortic valve stenosis Yes     Benign essential hypertension      Type 2 diabetes mellitus without complication, without long-term current use of insulin (H)      Hyperlipidemia LDL goal <70        Orders Placed This Encounter   Procedures     Cardiac Structural Heart Clinic     Thank you for allowing me to participate in the care of your patient.      Sincerely,     Kulwinder Villatoro MD     [facility]    cc:   Kulwinder Villatoro MD  [address] [place] [id number]

## 2021-04-22 NOTE — PATIENT INSTRUCTIONS
1.  The heart valve team nurse will be in touch with you in the next few weeks to set up clinic appointment and testing in end of August/early September for your heart valve replacement.    If you have any questions or concerns, please contact my nurses at [phone].

## 2021-05-13 ENCOUNTER — TELEPHONE (OUTPATIENT)
Dept: CARDIOLOGY | Facility: CLINIC | Age: 81
End: 2021-05-13

## 2021-05-13 DIAGNOSIS — I35.0 SEVERE AORTIC VALVE STENOSIS: Primary | ICD-10-CM

## 2021-05-13 NOTE — TELEPHONE ENCOUNTER
Referral received from Dr. Villatoro.     Telephoned patient to introduce self and valve team. Per Dr. Villatoro, pt to have repeat echo and OV with Dr. Meadows in Aug/Sept 2021. Patient verbalized understanding of plan. Will have scheduling contact patient to set-up and send new patient packet.      Denies contrast allergy  Dentist: Jan or Feb 2021, gets teeth cleaned [id number].    Debra Akhtar, RN  Structural Heart Coordinator  [facility]

## 2021-07-30 ENCOUNTER — HOSPITAL ENCOUNTER (OUTPATIENT)
Dept: CARDIOLOGY | Facility: CLINIC | Age: 81
Discharge: HOME | End: 2021-07-30
Attending: INTERNAL MEDICINE | Admitting: INTERNAL MEDICINE
Payer: MEDICARE

## 2021-07-30 DIAGNOSIS — I35.0 SEVERE AORTIC VALVE STENOSIS: ICD-10-CM

## 2021-07-30 LAB — LVEF ECHO: NORMAL

## 2021-07-30 PROCEDURE — 93306 TTE W/DOPPLER COMPLETE: CPT | Mod: 26 | Performed by: INTERNAL MEDICINE

## 2021-07-30 PROCEDURE — 93306 TTE W/DOPPLER COMPLETE: CPT

## 2021-08-03 ENCOUNTER — OFFICE VISIT (OUTPATIENT)
Dept: CARDIOLOGY | Facility: CLINIC | Age: 81
End: 2021-08-03
Attending: INTERNAL MEDICINE
Payer: MEDICARE

## 2021-08-03 VITALS
HEART RATE: 71 BPM | BODY MASS INDEX: 27.86 KG/M2 | OXYGEN SATURATION: 100 % | DIASTOLIC BLOOD PRESSURE: 72 MMHG | WEIGHT: 199 LBS | SYSTOLIC BLOOD PRESSURE: 138 MMHG | HEIGHT: 71 IN

## 2021-08-03 DIAGNOSIS — I35.0 SEVERE AORTIC VALVE STENOSIS: Primary | ICD-10-CM

## 2021-08-03 PROCEDURE — 93000 ELECTROCARDIOGRAM COMPLETE: CPT | Performed by: INTERNAL MEDICINE

## 2021-08-03 PROCEDURE — 99204 OFFICE O/P NEW MOD 45 MIN: CPT | Mod: 25 | Performed by: INTERNAL MEDICINE

## 2021-08-03 ASSESSMENT — MIFFLIN-ST. JEOR: SCORE: 1631.85

## 2021-08-03 NOTE — PROGRESS NOTES
HPI and Plan:   See dictation    Orders Placed This Encounter   Procedures     Follow-Up with Cardiologist     EKG 12-lead complete w/read - Clinics (performed today)     Echocardiogram Complete       No orders of the defined types were placed in this encounter.      There are no discontinued medications.      Encounter Diagnosis   Name Primary?     Severe aortic valve stenosis Yes       CURRENT MEDICATIONS:  Current Outpatient Medications   Medication Sig Dispense Refill     aspirin (ASA) 81 MG chewable tablet Take 1 tablet (81 mg) by mouth daily 100 tablet 3     B Complex-Folic Acid (SUPER B COMPLEX MAXI PO)        diltiazem (CARTIA XT) 240 MG 24 hr capsule Take by mouth daily       glucosamine 500 MG CAPS capsule Take 500 mg by mouth daily       JARDIANCE 10 MG TABS tablet        Metoprolol Succinate 25 MG CS24 Take 25 mg by mouth       rosuvastatin (CRESTOR) 20 MG tablet Take 20 mg by mouth daily Pt states he is taking 1/2 a tablet instead       VITAMIN D, CHOLECALCIFEROL, PO Take 2,000 Units by mouth daily       vitamin E 400 UNIT capsule Take 400 Units by mouth daily         ALLERGIES   No Known Allergies    PAST MEDICAL HISTORY:  Past Medical History:   Diagnosis Date     Aortic valve stenosis      Esophagitis, reflux      Hyperlipidemia      Hypertension      Lumbar disc disorder with myelopathy     bilateral leg weakness     RBBB (right bundle branch block)      Type 2 diabetes mellitus (H)        PAST SURGICAL HISTORY:  Past Surgical History:   Procedure Laterality Date     ANGIOGRAM      Patient reports coronary angiogram at [place], [place] approx 10 years ago       FAMILY HISTORY:  Family History   Problem Relation Age of Onset     Hypertension Mother         valve replacement      Coronary Artery Disease Father        SOCIAL HISTORY:  Social History     Socioeconomic History     Marital status:      Spouse name: Karissa     Number of children: None     Years of education: None     Highest  education level: None   Occupational History     Occupation: Small business owner   Tobacco Use     Smoking status: Never Smoker     Smokeless tobacco: Never Used   Substance and Sexual Activity     Alcohol use: Yes     Comment: 1-2 a week      Drug use: No     Sexual activity: None   Other Topics Concern     Parent/sibling w/ CABG, MI or angioplasty before 65F 55M? Not Asked   Social History Narrative     None     Social Determinants of Health     Financial Resource Strain:      Difficulty of Paying Living Expenses:    Food Insecurity:      Worried About Running Out of Food in the Last Year:      Ran Out of Food in the Last Year:    Transportation Needs:      Lack of Transportation (Medical):      Lack of Transportation (Non-Medical):    Physical Activity:      Days of Exercise per Week:      Minutes of Exercise per Session:    Stress:      Feeling of Stress :    Social Connections:      Frequency of Communication with Friends and Family:      Frequency of Social Gatherings with Friends and Family:      Attends Temple Services:      Active Member of Clubs or Organizations:      Attends Club or Organization Meetings:      Marital Status:    Intimate Partner Violence:      Fear of Current or Ex-Partner:      Emotionally Abused:      Physically Abused:      Sexually Abused:        Review of Systems:  Skin:  Positive for itching at night   Eyes:  Positive for glasses    ENT:  Negative      Respiratory:  Negative       Cardiovascular:  Negative for;palpitations;chest pain;fatigue;dizziness;lightheadedness;edema;syncope or near-syncope      Gastroenterology: Positive for heartburn    Genitourinary:  Positive for nocturia    Musculoskeletal:  Positive for back pain knees   Neurologic:  Positive for numbness or tingling of feet left foot little tingling in the morning   Psychiatric:  Negative      Heme/Lymph/Imm:  Negative      Endocrine:  Positive for diabetes Type II    Physical Exam:  Vitals: /72   Pulse 71    "Ht 1.791 m (5' 10.5\")   Wt 90.3 kg (199 lb)   SpO2 100%   BMI 28.15 kg/m      Constitutional:  cooperative;in no acute distress        Skin:  warm and dry to the touch          Head:  normocephalic        Eyes:  conjunctivae and lids unremarkable        Lymph:      ENT:  no pallor or cyanosis        Neck:  JVP normal;no carotid bruit        Respiratory:            Cardiac: regular rhythm       grade 2;systolic ejection murmur        pulses full and equal                                        GI:  abdomen soft;no masses;non-tender        Extremities and Muscular Skeletal:  no edema              Neurological:  no gross motor deficits        Psych:  Alert and Oriented x 3        CC  Mothilal Yue Villatoro MD  [address] [place] [id number]              "

## 2021-08-03 NOTE — LETTER
8/3/2021    MICHELLE WATKINS  [facility] [address]  [facility] [address]    RE: Reynaldo Monteiro       Dear Colleague,    I had the pleasure of seeing Reynaldo Monteiro in the [facility].    HPI and Plan:   See dictation    Orders Placed This Encounter   Procedures     Follow-Up with Cardiologist     EKG 12-lead complete w/read - Clinics (performed today)     Echocardiogram Complete       No orders of the defined types were placed in this encounter.      There are no discontinued medications.      Encounter Diagnosis   Name Primary?     Severe aortic valve stenosis Yes       CURRENT MEDICATIONS:  Current Outpatient Medications   Medication Sig Dispense Refill     aspirin (ASA) 81 MG chewable tablet Take 1 tablet (81 mg) by mouth daily 100 tablet 3     B Complex-Folic Acid (SUPER B COMPLEX MAXI PO)        diltiazem (CARTIA XT) 240 MG 24 hr capsule Take by mouth daily       glucosamine 500 MG CAPS capsule Take 500 mg by mouth daily       JARDIANCE 10 MG TABS tablet        Metoprolol Succinate 25 MG CS24 Take 25 mg by mouth       rosuvastatin (CRESTOR) 20 MG tablet Take 20 mg by mouth daily Pt states he is taking 1/2 a tablet instead       VITAMIN D, CHOLECALCIFEROL, PO Take 2,000 Units by mouth daily       vitamin E 400 UNIT capsule Take 400 Units by mouth daily         ALLERGIES   No Known Allergies    PAST MEDICAL HISTORY:  Past Medical History:   Diagnosis Date     Aortic valve stenosis      Esophagitis, reflux      Hyperlipidemia      Hypertension      Lumbar disc disorder with myelopathy     bilateral leg weakness     RBBB (right bundle branch block)      Type 2 diabetes mellitus (H)        PAST SURGICAL HISTORY:  Past Surgical History:   Procedure Laterality Date     ANGIOGRAM      Patient reports coronary angiogram at [place], [place] approx 10 years ago       FAMILY HISTORY:  Family History   Problem Relation Age of Onset     Hypertension  Mother         valve replacement      Coronary Artery Disease Father        SOCIAL HISTORY:  Social History     Socioeconomic History     Marital status:      Spouse name: Karissa     Number of children: None     Years of education: None     Highest education level: None   Occupational History     Occupation: Small business owner   Tobacco Use     Smoking status: Never Smoker     Smokeless tobacco: Never Used   Substance and Sexual Activity     Alcohol use: Yes     Comment: 1-2 a week      Drug use: No     Sexual activity: None   Other Topics Concern     Parent/sibling w/ CABG, MI or angioplasty before 65F 55M? Not Asked   Social History Narrative     None     Social Determinants of Health     Financial Resource Strain:      Difficulty of Paying Living Expenses:    Food Insecurity:      Worried About Running Out of Food in the Last Year:      Ran Out of Food in the Last Year:    Transportation Needs:      Lack of Transportation (Medical):      Lack of Transportation (Non-Medical):    Physical Activity:      Days of Exercise per Week:      Minutes of Exercise per Session:    Stress:      Feeling of Stress :    Social Connections:      Frequency of Communication with Friends and Family:      Frequency of Social Gatherings with Friends and Family:      Attends Shinto Services:      Active Member of Clubs or Organizations:      Attends Club or Organization Meetings:      Marital Status:    Intimate Partner Violence:      Fear of Current or Ex-Partner:      Emotionally Abused:      Physically Abused:      Sexually Abused:        Review of Systems:  Skin:  Positive for itching at night   Eyes:  Positive for glasses    ENT:  Negative      Respiratory:  Negative       Cardiovascular:  Negative for;palpitations;chest pain;fatigue;dizziness;lightheadedness;edema;syncope or near-syncope      Gastroenterology: Positive for heartburn    Genitourinary:  Positive for nocturia    Musculoskeletal:  Positive for back pain  "knees   Neurologic:  Positive for numbness or tingling of feet left foot little tingling in the morning   Psychiatric:  Negative      Heme/Lymph/Imm:  Negative      Endocrine:  Positive for diabetes Type II    Physical Exam:  Vitals: /72   Pulse 71   Ht 1.791 m (5' 10.5\")   Wt 90.3 kg (199 lb)   SpO2 100%   BMI 28.15 kg/m      Constitutional:  cooperative;in no acute distress        Skin:  warm and dry to the touch          Head:  normocephalic        Eyes:  conjunctivae and lids unremarkable        Lymph:      ENT:  no pallor or cyanosis        Neck:  JVP normal;no carotid bruit        Respiratory:            Cardiac: regular rhythm       grade 2;systolic ejection murmur        pulses full and equal                                        GI:  abdomen soft;no masses;non-tender        Extremities and Muscular Skeletal:  no edema              Neurological:  no gross motor deficits        Psych:  Alert and Oriented x 3        CC  Kulwinder Villatoro MD  [address] [place], [place] [id number]              Thank you for allowing me to participate in the care of your patient.      Sincerely,     Edwin Meadows MD, MD     [facility]  cc:   Kulwinder Villatoro MD  [address] [place] [id number]        "

## 2021-08-03 NOTE — PROGRESS NOTES
Service Date: 08/03/2021    REASON FOR CONSULTATION:  Aortic stenosis.    REFERRING PHYSICIAN:  Yue Villatoro MD    HISTORY OF PRESENT ILLNESS:  I had the pleasure of seeing Reynaldo Monteiro at the [facility] in [place] this morning.  He is a very pleasant 81-year-old gentleman who is a patient of my partner, Dr. Villatoro.  He has a known aortic stenosis, type 2 diabetes, hypertension and hyperlipidemia.    He has been followed very closely with regards to his aortic stenosis.  He has had multiple prior echocardiograms, which I have personally reviewed.  Echocardiogram from 09/2018 suggested moderate to severe aortic valve stenosis with a mean gradient of 39 mmHg and valve area of 1.2 cm2.  A few months later, he had a transesophageal echocardiogram to get a closer look at the valve.  A SKYLER showed a peak velocity of 3.3 cm per second with a gradient of 24 and valve area of 1.2 cm2 by planimetry.      He was then referred for exercise stress test.  He was able to exercise 9 minutes on a standard Jesus protocol.  He had no symptoms and no evidence of arrhythmia or inducible ischemia.  Echocardiogram performed in 2019 again suggested moderate to severe AS with a mean gradient of 33 mmHg and valve area of 1.3.      Echo from 2020 showed stable findings.  In 04/2021, he had an echocardiogram, again which showed a mean gradient of 39 and a valve area of 1.2 as well as preserved LV function.    He was last seen by Dr. Villatoro and in April.  A repeat echo in 3 months and followup in the [facility] was recommended.    Mr. Monteiro is very active and currently does not endorse overwhelming cardiopulmonary symptoms, although he has slowed down over the years because of aging.  Specifically, no shortness of breath.  Specifically, no chest pain, palpitations, presyncope or syncope.    He had an echocardiogram a few weeks ago, which I reviewed.  His LV function is preserved.  Aortic valve is calcified with restrictive leaflet motion.  The  mean gradient at this time was measured at 30 millimeters of mercury and the valve area is around 1.3 cm2.  His dimensionless index is 0.32.    On exam, his systolic ejection murmur is mid to late peaking.  P2 is somewhat preserved and his carotid upstroke is slightly delayed.    ASSESSMENT AND PLAN:  A very pleasant 81-year-old gentleman with a known aortic stenosis.  I reviewed his prior echocardiograms, including his most recent echocardiogram from 2021.  Visually, the leaflets were quite restrictive.  However, his mean gradient as well as his valve area, which have remained stable over the last 3 years, are still consistent with moderate to severe.  He does not endorse any overwhelming myocardial pulmonary symptoms.  He was able to exercise 9 minutes Jesus protocol several years ago, which was also quite reassuring.  He remains active and currently does not endorse any new symptoms.    Given lack of symptoms and the fact that these numbers are not quite at the severe range, per guidelines, I would recommend continued surveillance.  We will repeat another echocardiogram in approximately 3 months and see him for followup at that time.  In the interim, if he develops any cardiac symptoms, we will see him sooner and likely proceed with a transcatheter aortic valve replacement workup.    He is on an excellent medical program, which I recommended that he continue.    It was a pleasure seeing Mr. Monteiro in the clinic this morning.  I appreciate the opportunity to participate in his care.    Edwin Meadows MD        D: 2021   T: 2021   MT: ALEX    Name:     BUTCH MONTEIRO  MRN:      [id number]        Account:      [id number]   :      1940           Service Date: 2021       Document: [id number]

## 2021-09-30 ENCOUNTER — TRANSFERRED RECORDS (OUTPATIENT)
Dept: HEALTH INFORMATION MANAGEMENT | Facility: CLINIC | Age: 81
End: 2021-09-30

## 2021-11-01 ENCOUNTER — HOSPITAL ENCOUNTER (OUTPATIENT)
Dept: CARDIOLOGY | Facility: CLINIC | Age: 81
Discharge: HOME | End: 2021-11-01
Attending: INTERNAL MEDICINE | Admitting: INTERNAL MEDICINE
Payer: MEDICARE

## 2021-11-01 DIAGNOSIS — I35.0 SEVERE AORTIC VALVE STENOSIS: ICD-10-CM

## 2021-11-01 LAB — LVEF ECHO: NORMAL

## 2021-11-01 PROCEDURE — 93306 TTE W/DOPPLER COMPLETE: CPT

## 2021-11-01 PROCEDURE — 93306 TTE W/DOPPLER COMPLETE: CPT | Mod: 26 | Performed by: INTERNAL MEDICINE

## 2021-11-03 ENCOUNTER — OFFICE VISIT (OUTPATIENT)
Dept: CARDIOLOGY | Facility: CLINIC | Age: 81
End: 2021-11-03
Payer: MEDICARE

## 2021-11-03 VITALS
BODY MASS INDEX: 27.97 KG/M2 | DIASTOLIC BLOOD PRESSURE: 70 MMHG | HEIGHT: 71 IN | SYSTOLIC BLOOD PRESSURE: 140 MMHG | WEIGHT: 199.8 LBS

## 2021-11-03 DIAGNOSIS — I35.0 SEVERE AORTIC VALVE STENOSIS: Primary | ICD-10-CM

## 2021-11-03 PROCEDURE — 99214 OFFICE O/P EST MOD 30 MIN: CPT | Performed by: INTERNAL MEDICINE

## 2021-11-03 ASSESSMENT — MIFFLIN-ST. JEOR: SCORE: 1635.48

## 2021-11-03 NOTE — PROGRESS NOTES
REASON FOR CONSULTATION:  Aortic stenosis.    REFERRING PHYSICIAN:  Yue Villatoro MD    HISTORY OF PRESENT ILLNESS:  I had the pleasure of seeing Reynaldo Monteiro at the [facility] in [place] this morning.  He is a very pleasant 81-year-old gentleman with known aortic stenosis, type 2 diabetes, hypertension and hyperlipidemia.    He has been followed very closely with regards to his aortic stenosis.  He has had multiple prior echocardiograms, which I have personally reviewed.  Echocardiogram from 09/2018 suggested moderate to severe aortic valve stenosis with a mean gradient of 39 mmHg and valve area of 1.2 cm2.  A few months later, he had a transesophageal echocardiogram to get a closer look at the valve.  A SKYLER showed a peak velocity of 3.3 cm per second with a gradient of 24 and valve area of 1.2 cm2 by planimetry.      He was then referred for exercise stress test.  He was able to exercise 9 minutes on a standard Jesus protocol.  He had no symptoms and no evidence of arrhythmia or inducible ischemia.  Echocardiogram performed in 2019 again suggested moderate to severe AS with a mean gradient of 33 mmHg and valve area of 1.3.  He did not endorse any significant symptoms therefore I recommended repeat echo in 3 months and follow-up.    Over the last 3 months he has remained active.  He does not endorse significant shortness of breath but is noticing more fatigue.  No shortness of breath, palpitations presyncope or syncope.    Echo from 2020 showed stable findings.  In 04/2021, he had an echocardiogram, again which showed a mean gradient of 39 and a valve area of 1.2 as well as preserved LV function.    I initially saw him 3 months ago for consultation.  Echocardiogram performed at that time revealed moderate to severe aortic stenosis with mean gradient of 30 mmHg and a valve area of 1.3 cm .    Mr. Monteiro is very active and currently does not endorse overwhelming cardiopulmonary symptoms, although he has  slowed down over the years because of aging.  Specifically, no shortness of breath.  Specifically, no chest pain, palpitations, presyncope or syncope.      ASSESSMENT AND PLAN:  A very pleasant 81-year-old gentleman with a known aortic stenosis.  I reviewed his echocardiogram from few days ago.  His LV function is preserved.  The valve area is 1.1 cm  and his mean gradient across the valve is 37 mmHg.  Dimensionless index is 0.26.  Overall the echo findings are consistent with moderate to severe aortic stenosis.    Fortunately, he continues to be free of significant symptoms although he is noticing more fatigue lately.  Would recommend repeat echocardiogram in approximately 3 months.  In the interim if he develops significant shortness of breath, chest pain or palpitations he will let us know and will most likely proceed with TAVR work-up.    He is on an excellent medical program, which I recommended that he continue.    It was a pleasure seeing Mr. Monteiro in the clinic this morning.  I appreciate the opportunity to participate in his care.    Edwin Meadows MD      Orders Placed This Encounter   Procedures     Echocardiogram Complete       No orders of the defined types were placed in this encounter.      There are no discontinued medications.      Encounter Diagnosis   Name Primary?     Severe aortic valve stenosis Yes       CURRENT MEDICATIONS:  Current Outpatient Medications   Medication Sig Dispense Refill     aspirin (ASA) 81 MG chewable tablet Take 1 tablet (81 mg) by mouth daily 100 tablet 3     B Complex-Folic Acid (SUPER B COMPLEX MAXI PO)        diltiazem (CARTIA XT) 240 MG 24 hr capsule Take by mouth daily       glucosamine 500 MG CAPS capsule Take 500 mg by mouth daily       JARDIANCE 10 MG TABS tablet        Metoprolol Succinate 25 MG CS24 Take 25 mg by mouth       rosuvastatin (CRESTOR) 20 MG tablet Take 20 mg by mouth daily Pt states he is taking 1/2 a tablet instead       VITAMIN D, CHOLECALCIFEROL,  PO Take 2,000 Units by mouth daily       vitamin E 400 UNIT capsule Take 400 Units by mouth daily         ALLERGIES   No Known Allergies    PAST MEDICAL HISTORY:  Past Medical History:   Diagnosis Date     Aortic valve stenosis      Esophagitis, reflux      Hyperlipidemia      Hypertension      Lumbar disc disorder with myelopathy     bilateral leg weakness     RBBB (right bundle branch block)      Type 2 diabetes mellitus (H)        PAST SURGICAL HISTORY:  Past Surgical History:   Procedure Laterality Date     ANGIOGRAM      Patient reports coronary angiogram at [place], [place] approx 10 years ago       FAMILY HISTORY:  Family History   Problem Relation Age of Onset     Hypertension Mother         valve replacement      Coronary Artery Disease Father        SOCIAL HISTORY:  Social History     Socioeconomic History     Marital status:      Spouse name: Karissa     Number of children: None     Years of education: None     Highest education level: None   Occupational History     Occupation: Small business owner   Tobacco Use     Smoking status: Never Smoker     Smokeless tobacco: Never Used   Substance and Sexual Activity     Alcohol use: Yes     Comment: 1-2 a week      Drug use: No     Sexual activity: None   Other Topics Concern     Parent/sibling w/ CABG, MI or angioplasty before 65F 55M? Not Asked   Social History Narrative     None     Social Determinants of Health     Financial Resource Strain:      Difficulty of Paying Living Expenses:    Food Insecurity:      Worried About Running Out of Food in the Last Year:      Ran Out of Food in the Last Year:    Transportation Needs:      Lack of Transportation (Medical):      Lack of Transportation (Non-Medical):    Physical Activity:      Days of Exercise per Week:      Minutes of Exercise per Session:    Stress:      Feeling of Stress :    Social Connections:      Frequency of Communication with Friends and Family:      Frequency of Social Gatherings with  "Friends and Family:      Attends Congregational Services:      Active Member of Clubs or Organizations:      Attends Club or Organization Meetings:      Marital Status:    Intimate Partner Violence:      Fear of Current or Ex-Partner:      Emotionally Abused:      Physically Abused:      Sexually Abused:        Review of Systems:  Skin:  Positive for itching at night   Eyes:  Positive for glasses 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   ENT:  Negative      Respiratory:  Negative       Cardiovascular:  Negative for;palpitations;chest pain;dizziness;lightheadedness;edema;syncope or near-syncope Positive for;fatigue    Gastroenterology: Positive for heartburn    Genitourinary:  Positive for nocturia    Musculoskeletal:  Positive for back pain knees   Neurologic:  Positive for numbness or tingling of feet left foot little tingling in the morning   Psychiatric:  Negative      Heme/Lymph/Imm:  Negative      Endocrine:  Positive for diabetes Type II    Physical Exam:  Vitals: BP (!) 140/70   Ht 1.791 m (5' 10.5\")   Wt 90.6 kg (199 lb 12.8 oz)   BMI 28.26 kg/m      Constitutional:  cooperative;in no acute distress        Skin:  warm and dry to the touch          Head:  normocephalic        Eyes:  conjunctivae and lids unremarkable        Lymph:      ENT:  no pallor or cyanosis        Neck:  JVP normal;no carotid bruit        Respiratory:            Cardiac: regular rhythm       grade 2;systolic ejection murmur        pulses full and equal                                        GI:  abdomen soft;no masses;non-tender        Extremities and Muscular Skeletal:  no edema              Neurological:  no gross motor deficits        Psych:  Alert and Oriented x 3        CC  Edwin Meadows MD  [address]  [place] [id number]              "

## 2021-11-03 NOTE — LETTER
11/3/2021    MICHELLE OLEARY WATKINS  [facility] [address] [facility] [address]    RE: Reynaldo Fontainejuno       Dear Colleague,    I had the pleasure of seeing Reynaldo Monteiro in the [facility].  REASON FOR CONSULTATION:  Aortic stenosis.    REFERRING PHYSICIAN:  Yue Villatoro MD    HISTORY OF PRESENT ILLNESS:  I had the pleasure of seeing Reynaldo Monteiro at the [facility] in [place] this morning.  He is a very pleasant 81-year-old gentleman with known aortic stenosis, type 2 diabetes, hypertension and hyperlipidemia.    He has been followed very closely with regards to his aortic stenosis.  He has had multiple prior echocardiograms, which I have personally reviewed.  Echocardiogram from 09/2018 suggested moderate to severe aortic valve stenosis with a mean gradient of 39 mmHg and valve area of 1.2 cm2.  A few months later, he had a transesophageal echocardiogram to get a closer look at the valve.  A SKYLER showed a peak velocity of 3.3 cm per second with a gradient of 24 and valve area of 1.2 cm2 by planimetry.      He was then referred for exercise stress test.  He was able to exercise 9 minutes on a standard Jesus protocol.  He had no symptoms and no evidence of arrhythmia or inducible ischemia.  Echocardiogram performed in 2019 again suggested moderate to severe AS with a mean gradient of 33 mmHg and valve area of 1.3.  He did not endorse any significant symptoms therefore I recommended repeat echo in 3 months and follow-up.    Over the last 3 months he has remained active.  He does not endorse significant shortness of breath but is noticing more fatigue.  No shortness of breath, palpitations presyncope or syncope.    Echo from 2020 showed stable findings.  In 04/2021, he had an echocardiogram, again which showed a mean gradient of 39 and a valve area of 1.2 as well as preserved LV function.    I initially saw him 3 months ago for consultation.   Echocardiogram performed at that time revealed moderate to severe aortic stenosis with mean gradient of 30 mmHg and a valve area of 1.3 cm .    Mr. Monteiro is very active and currently does not endorse overwhelming cardiopulmonary symptoms, although he has slowed down over the years because of aging.  Specifically, no shortness of breath.  Specifically, no chest pain, palpitations, presyncope or syncope.      ASSESSMENT AND PLAN:  A very pleasant 81-year-old gentleman with a known aortic stenosis.  I reviewed his echocardiogram from few days ago.  His LV function is preserved.  The valve area is 1.1 cm  and his mean gradient across the valve is 37 mmHg.  Dimensionless index is 0.26.  Overall the echo findings are consistent with moderate to severe aortic stenosis.    Fortunately, he continues to be free of significant symptoms although he is noticing more fatigue lately.  Would recommend repeat echocardiogram in approximately 3 months.  In the interim if he develops significant shortness of breath, chest pain or palpitations he will let us know and will most likely proceed with TAVR work-up.    He is on an excellent medical program, which I recommended that he continue.    It was a pleasure seeing Mr. Monteiro in the clinic this morning.  I appreciate the opportunity to participate in his care.    Edwin Meadows MD      Orders Placed This Encounter   Procedures     Echocardiogram Complete       No orders of the defined types were placed in this encounter.      There are no discontinued medications.      Encounter Diagnosis   Name Primary?     Severe aortic valve stenosis Yes       CURRENT MEDICATIONS:  Current Outpatient Medications   Medication Sig Dispense Refill     aspirin (ASA) 81 MG chewable tablet Take 1 tablet (81 mg) by mouth daily 100 tablet 3     B Complex-Folic Acid (SUPER B COMPLEX MAXI PO)        diltiazem (CARTIA XT) 240 MG 24 hr capsule Take by mouth daily       glucosamine 500 MG CAPS capsule Take 500 mg  by mouth daily       JARDIANCE 10 MG TABS tablet        Metoprolol Succinate 25 MG CS24 Take 25 mg by mouth       rosuvastatin (CRESTOR) 20 MG tablet Take 20 mg by mouth daily Pt states he is taking 1/2 a tablet instead       VITAMIN D, CHOLECALCIFEROL, PO Take 2,000 Units by mouth daily       vitamin E 400 UNIT capsule Take 400 Units by mouth daily         ALLERGIES   No Known Allergies    PAST MEDICAL HISTORY:  Past Medical History:   Diagnosis Date     Aortic valve stenosis      Esophagitis, reflux      Hyperlipidemia      Hypertension      Lumbar disc disorder with myelopathy     bilateral leg weakness     RBBB (right bundle branch block)      Type 2 diabetes mellitus (H)        PAST SURGICAL HISTORY:  Past Surgical History:   Procedure Laterality Date     ANGIOGRAM      Patient reports coronary angiogram at [place], [place] approx 10 years ago       FAMILY HISTORY:  Family History   Problem Relation Age of Onset     Hypertension Mother         valve replacement      Coronary Artery Disease Father        SOCIAL HISTORY:  Social History     Socioeconomic History     Marital status:      Spouse name: Karissa     Number of children: None     Years of education: None     Highest education level: None   Occupational History     Occupation: Small business owner   Tobacco Use     Smoking status: Never Smoker     Smokeless tobacco: Never Used   Substance and Sexual Activity     Alcohol use: Yes     Comment: 1-2 a week      Drug use: No     Sexual activity: None   Other Topics Concern     Parent/sibling w/ CABG, MI or angioplasty before 65F 55M? Not Asked   Social History Narrative     None     Social Determinants of Health     Financial Resource Strain:      Difficulty of Paying Living Expenses:    Food Insecurity:      Worried About Running Out of Food in the Last Year:      Ran Out of Food in the Last Year:    Transportation Needs:      Lack of Transportation (Medical):      Lack of Transportation  "(Non-Medical):    Physical Activity:      Days of Exercise per Week:      Minutes of Exercise per Session:    Stress:      Feeling of Stress :    Social Connections:      Frequency of Communication with Friends and Family:      Frequency of Social Gatherings with Friends and Family:      Attends Yarsani Services:      Active Member of Clubs or Organizations:      Attends Club or Organization Meetings:      Marital Status:    Intimate Partner Violence:      Fear of Current or Ex-Partner:      Emotionally Abused:      Physically Abused:      Sexually Abused:        Review of Systems:  Skin:  Positive for itching at night   Eyes:  Positive for glasses 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   ENT:  Negative      Respiratory:  Negative       Cardiovascular:  Negative for;palpitations;chest pain;dizziness;lightheadedness;edema;syncope or near-syncope Positive for;fatigue    Gastroenterology: Positive for heartburn    Genitourinary:  Positive for nocturia    Musculoskeletal:  Positive for back pain knees   Neurologic:  Positive for numbness or tingling of feet left foot little tingling in the morning   Psychiatric:  Negative      Heme/Lymph/Imm:  Negative      Endocrine:  Positive for diabetes Type II    Physical Exam:  Vitals: BP (!) 140/70   Ht 1.791 m (5' 10.5\")   Wt 90.6 kg (199 lb 12.8 oz)   BMI 28.26 kg/m      Constitutional:  cooperative;in no acute distress        Skin:  warm and dry to the touch          Head:  normocephalic        Eyes:  conjunctivae and lids unremarkable        Lymph:      ENT:  no pallor or cyanosis        Neck:  JVP normal;no carotid bruit        Respiratory:            Cardiac: regular rhythm       grade 2;systolic ejection murmur        pulses full and equal                                        GI:  abdomen soft;no masses;non-tender        Extremities and Muscular Skeletal:  no edema              Neurological:  no gross motor deficits        Psych:  Alert and Oriented x 3          Edwin Ahmad " MD Abdiel, MD   [facility]

## 2022-04-05 ENCOUNTER — HOSPITAL ENCOUNTER (OUTPATIENT)
Dept: CARDIOLOGY | Facility: CLINIC | Age: 82
Discharge: HOME | End: 2022-04-05
Attending: INTERNAL MEDICINE | Admitting: INTERNAL MEDICINE
Payer: MEDICARE

## 2022-04-05 DIAGNOSIS — I35.0 SEVERE AORTIC VALVE STENOSIS: ICD-10-CM

## 2022-04-05 LAB — LVEF ECHO: NORMAL

## 2022-04-05 PROCEDURE — 93306 TTE W/DOPPLER COMPLETE: CPT | Mod: 26 | Performed by: INTERNAL MEDICINE

## 2022-04-05 PROCEDURE — 93306 TTE W/DOPPLER COMPLETE: CPT

## 2022-04-07 ENCOUNTER — TELEPHONE (OUTPATIENT)
Dept: CARDIOLOGY | Facility: CLINIC | Age: 82
End: 2022-04-07

## 2022-04-07 ENCOUNTER — OFFICE VISIT (OUTPATIENT)
Dept: CARDIOLOGY | Facility: CLINIC | Age: 82
End: 2022-04-07
Payer: MEDICARE

## 2022-04-07 VITALS
HEART RATE: 93 BPM | DIASTOLIC BLOOD PRESSURE: 70 MMHG | BODY MASS INDEX: 29.56 KG/M2 | HEIGHT: 70 IN | SYSTOLIC BLOOD PRESSURE: 152 MMHG | WEIGHT: 206.5 LBS

## 2022-04-07 DIAGNOSIS — I35.0 SEVERE AORTIC VALVE STENOSIS: Primary | ICD-10-CM

## 2022-04-07 PROCEDURE — 99214 OFFICE O/P EST MOD 30 MIN: CPT | Performed by: INTERNAL MEDICINE

## 2022-04-07 RX ORDER — ROSUVASTATIN CALCIUM 20 MG/1
10 TABLET, COATED ORAL DAILY
COMMUNITY
End: 2024-08-23

## 2022-04-07 NOTE — PROGRESS NOTES
HPI and Plan:   REASON FOR CONSULTATION:  Aortic stenosis.    REFERRING PHYSICIAN:  Yue Villatoro MD    HISTORY OF PRESENT ILLNESS:  I had the pleasure of seeing Reynaldo Monteiro at the [facility] in [place] this morning.  He is a very pleasant 82-year-old gentleman with known aortic stenosis, type 2 diabetes, hypertension and hyperlipidemia.    He has been followed very closely with regards to his aortic stenosis.  He has had multiple prior echocardiograms, which I have personally reviewed.  Echocardiogram from 09/2018 suggested moderate to severe aortic valve stenosis with a mean gradient of 39 mmHg and valve area of 1.2 cm2.  A few months later, he had a transesophageal echocardiogram to get a closer look at the valve.  A SKYLER showed a peak velocity of 3.3 cm per second with a gradient of 24 and valve area of 1.2 cm2 by planimetry. He was then referred for exercise stress test.  He was able to exercise 9 minutes on a standard Jesus protocol.  He had no symptoms and no evidence of arrhythmia or inducible ischemia.  Echocardiogram performed in 2019 again suggested moderate to severe AS with a mean gradient of 33 mmHg and valve area of 1.3.  He did not endorse any significant symptoms therefore I recommended repeat echo in 3 months and follow-up.      I initially saw him last summer.  Echocardiogram performed at that time revealed moderate to severe aortic stenosis with mean gradient of 30 mmHg and a valve area of 1.3 cm .  Repeat echo performed 3 months later revealed preserved LV function with EF of 55 to 60% and moderate to severe aortic stenosis with mean gradient of 37 mmHg and valve area 1.1 cm     He has remained quite stable from cardiac point of view since her last visit.  He is not as active as he was this past summer but still walks a fair amount of time.  He does not endorse significant cardiopulmonary symptoms at this point.  Specifically no chest pain or shortness of breath.  No presyncope or  syncope..      ASSESSMENT AND PLAN:  A very pleasant 81-year-old gentleman with a known aortic stenosis.  I reviewed his echocardiogram from few days ago.  His LV function is preserved.  The mean gradient across the valve is 34 mmHg.  His valve area is 1.0 cm  and his V-max is 3.8 m/s.  His dimensionless index is 0.26.  Overall the echo findings are consistent with moderate to severe aortic stenosis.    Fortunately, he continues to be free of significant symptoms.  Would recommend repeat echocardiogram in approximately 6 months.  In the interim if he develops significant shortness of breath, chest pain or fatigue he will let us know and will most likely proceed with TAVR work-up.    He is on an excellent medical program, which I recommended that he continue.    It was a pleasure seeing Mr. Monteiro in the clinic this morning.  I appreciate the opportunity to participate in his care.    Edwin Meadows MD      Orders Placed This Encounter   Procedures     Follow-Up with Cardiology     Echocardiogram Complete       Orders Placed This Encounter   Medications     aspirin (ASA) 325 MG EC tablet     Sig: Take 325 mg by mouth daily     rosuvastatin (CRESTOR) 20 MG tablet     Sig: Take 10 mg by mouth daily       Medications Discontinued During This Encounter   Medication Reason     aspirin (ASA) 81 MG chewable tablet Medication Reconciliation Clean Up     rosuvastatin (CRESTOR) 20 MG tablet Medication Reconciliation Clean Up         Encounter Diagnosis   Name Primary?     Severe aortic valve stenosis Yes       CURRENT MEDICATIONS:  Current Outpatient Medications   Medication Sig Dispense Refill     aspirin (ASA) 325 MG EC tablet Take 325 mg by mouth daily       B Complex-Folic Acid (SUPER B COMPLEX MAXI PO)        diltiazem (CARTIA XT) 240 MG 24 hr capsule Take by mouth daily       glucosamine 500 MG CAPS capsule Take 500 mg by mouth daily       JARDIANCE 10 MG TABS tablet 10 mg daily        Metoprolol Succinate 25 MG CS24  Take 25 mg by mouth       rosuvastatin (CRESTOR) 20 MG tablet Take 10 mg by mouth daily       VITAMIN D, CHOLECALCIFEROL, PO Take 2,000 Units by mouth daily       vitamin E 400 UNIT capsule Take 400 Units by mouth daily         ALLERGIES   No Known Allergies    PAST MEDICAL HISTORY:  Past Medical History:   Diagnosis Date     Aortic valve stenosis      Esophagitis, reflux      Hyperlipidemia      Hypertension      Lumbar disc disorder with myelopathy     bilateral leg weakness     RBBB (right bundle branch block)      Severe aortic valve stenosis      Type 2 diabetes mellitus (H)        PAST SURGICAL HISTORY:  Past Surgical History:   Procedure Laterality Date     ANGIOGRAM      Patient reports coronary angiogram at [place], [place] approx 10 years ago       FAMILY HISTORY:  Family History   Problem Relation Age of Onset     Hypertension Mother         valve replacement      Coronary Artery Disease Father        SOCIAL HISTORY:  Social History     Socioeconomic History     Marital status:      Spouse name: Karissa     Number of children: None     Years of education: None     Highest education level: None   Occupational History     Occupation: Small business owner   Tobacco Use     Smoking status: Never Smoker     Smokeless tobacco: Never Used   Substance and Sexual Activity     Alcohol use: Yes     Comment: 1-2 a week      Drug use: No     Sexual activity: None   Other Topics Concern     Parent/sibling w/ CABG, MI or angioplasty before 65F 55M? 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   ENT:   "Negative      Respiratory:  Negative       Cardiovascular:  Negative      Gastroenterology: Negative      Genitourinary:  Negative      Musculoskeletal:  Positive for back pain;joint pain knees   Neurologic:  Negative      Psychiatric:  Negative      Heme/Lymph/Imm:  Negative      Endocrine:  Positive for diabetes Type II    Physical Exam:  Vitals: BP (!) 152/70   Pulse 93   Ht 1.778 m (5' 10\")   Wt 93.7 kg (206 lb 8 oz)   BMI 29.63 kg/m      Constitutional:  cooperative;in no acute distress        Skin:  warm and dry to the touch          Head:  normocephalic        Eyes:  conjunctivae and lids unremarkable        Lymph:      ENT:  no pallor or cyanosis        Neck:  JVP normal;no carotid bruit        Respiratory:            Cardiac: regular rhythm       grade 2;systolic ejection murmur        pulses full and equal                                        GI:  abdomen soft;no masses;non-tender        Extremities and Muscular Skeletal:  no edema              Neurological:  no gross motor deficits        Psych:  Alert and Oriented x 3        CC  No referring provider defined for this encounter.              "

## 2022-04-07 NOTE — LETTER
4/7/2022    MICHELLE WATKINS  [facility] [address] [facility] [address]    RE: Reynaldo Fontainejuno       Dear Colleague,     I had the pleasure of seeing Reynaldo Monteiro in the [facility].  HPI and Plan:   REASON FOR CONSULTATION:  Aortic stenosis.    REFERRING PHYSICIAN:  Yue Villatoro MD    HISTORY OF PRESENT ILLNESS:  I had the pleasure of seeing Reynaldo Monteiro at the [facility] in [place] this morning.  He is a very pleasant 82-year-old gentleman with known aortic stenosis, type 2 diabetes, hypertension and hyperlipidemia.    He has been followed very closely with regards to his aortic stenosis.  He has had multiple prior echocardiograms, which I have personally reviewed.  Echocardiogram from 09/2018 suggested moderate to severe aortic valve stenosis with a mean gradient of 39 mmHg and valve area of 1.2 cm2.  A few months later, he had a transesophageal echocardiogram to get a closer look at the valve.  A SKYLER showed a peak velocity of 3.3 cm per second with a gradient of 24 and valve area of 1.2 cm2 by planimetry. He was then referred for exercise stress test.  He was able to exercise 9 minutes on a standard Jesus protocol.  He had no symptoms and no evidence of arrhythmia or inducible ischemia.  Echocardiogram performed in 2019 again suggested moderate to severe AS with a mean gradient of 33 mmHg and valve area of 1.3.  He did not endorse any significant symptoms therefore I recommended repeat echo in 3 months and follow-up.      I initially saw him last summer.  Echocardiogram performed at that time revealed moderate to severe aortic stenosis with mean gradient of 30 mmHg and a valve area of 1.3 cm .  Repeat echo performed 3 months later revealed preserved LV function with EF of 55 to 60% and moderate to severe aortic stenosis with mean gradient of 37 mmHg and valve area 1.1 cm     He has remained quite stable from cardiac point of view since her last visit.  He is not as  active as he was this past summer but still walks a fair amount of time.  He does not endorse significant cardiopulmonary symptoms at this point.  Specifically no chest pain or shortness of breath.  No presyncope or syncope..      ASSESSMENT AND PLAN:  A very pleasant 81-year-old gentleman with a known aortic stenosis.  I reviewed his echocardiogram from few days ago.  His LV function is preserved.  The mean gradient across the valve is 34 mmHg.  His valve area is 1.0 cm  and his V-max is 3.8 m/s.  His dimensionless index is 0.26.  Overall the echo findings are consistent with moderate to severe aortic stenosis.    Fortunately, he continues to be free of significant symptoms.  Would recommend repeat echocardiogram in approximately 6 months.  In the interim if he develops significant shortness of breath, chest pain or fatigue he will let us know and will most likely proceed with TAVR work-up.    He is on an excellent medical program, which I recommended that he continue.    It was a pleasure seeing Mr. Monteiro in the clinic this morning.  I appreciate the opportunity to participate in his care.    Edwin Meadows MD      Orders Placed This Encounter   Procedures     Follow-Up with Cardiology     Echocardiogram Complete       Orders Placed This Encounter   Medications     aspirin (ASA) 325 MG EC tablet     Sig: Take 325 mg by mouth daily     rosuvastatin (CRESTOR) 20 MG tablet     Sig: Take 10 mg by mouth daily       Medications Discontinued During This Encounter   Medication Reason     aspirin (ASA) 81 MG chewable tablet Medication Reconciliation Clean Up     rosuvastatin (CRESTOR) 20 MG tablet Medication Reconciliation Clean Up         Encounter Diagnosis   Name Primary?     Severe aortic valve stenosis Yes       CURRENT MEDICATIONS:  Current Outpatient Medications   Medication Sig Dispense Refill     aspirin (ASA) 325 MG EC tablet Take 325 mg by mouth daily       B Complex-Folic Acid (SUPER B COMPLEX MAXI PO)         diltiazem (CARTIA XT) 240 MG 24 hr capsule Take by mouth daily       glucosamine 500 MG CAPS capsule Take 500 mg by mouth daily       JARDIANCE 10 MG TABS tablet 10 mg daily        Metoprolol Succinate 25 MG CS24 Take 25 mg by mouth       rosuvastatin (CRESTOR) 20 MG tablet Take 10 mg by mouth daily       VITAMIN D, CHOLECALCIFEROL, PO Take 2,000 Units by mouth daily       vitamin E 400 UNIT capsule Take 400 Units by mouth daily         ALLERGIES   No Known Allergies    PAST MEDICAL HISTORY:  Past Medical History:   Diagnosis Date     Aortic valve stenosis      Esophagitis, reflux      Hyperlipidemia      Hypertension      Lumbar disc disorder with myelopathy     bilateral leg weakness     RBBB (right bundle branch block)      Severe aortic valve stenosis      Type 2 diabetes mellitus (H)        PAST SURGICAL HISTORY:  Past Surgical History:   Procedure Laterality Date     ANGIOGRAM      Patient reports coronary angiogram at [place], [place] approx 10 years ago       FAMILY HISTORY:  Family History   Problem Relation Age of Onset     Hypertension Mother         valve replacement      Coronary Artery Disease Father        SOCIAL HISTORY:  Social History     Socioeconomic History     Marital status:      Spouse name: Karissa     Number of children: None     Years of education: None     Highest education level: None   Occupational History     Occupation: Small business owner   Tobacco Use     Smoking status: Never Smoker     Smokeless tobacco: Never Used   Substance and Sexual Activity     Alcohol use: Yes     Comment: 1-2 a week      Drug use: No     Sexual activity: None   Other Topics Concern     Parent/sibling w/ CABG, MI or angioplasty before 65F 55M? 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   ENT:  Negative      Respiratory:  Negative       Cardiovascular:  Negative      Gastroenterology: Negative      Genitourinary:  Negative      Musculoskeletal:  Positive for back pain;joint pain knees   Neurologic:  Negative      Psychiatric:  Negative      Heme/Lymph/Imm:  Negative      Endocrine:  Positive for diabetes Type II    Physical Exam:  Vitals: BP (!) 152/70   Pulse 93   Ht 1.778 m (5' 10\")   Wt 93.7 kg (206 lb 8 oz)   BMI 29.63 kg/m      Constitutional:  cooperative;in no acute distress        Skin:  warm and dry to the touch          Head:  normocephalic        Eyes:  conjunctivae and lids unremarkable        Lymph:      ENT:  no pallor or cyanosis        Neck:  JVP normal;no carotid bruit        Respiratory:            Cardiac: regular rhythm       grade 2;systolic ejection murmur        pulses full and equal                                        GI:  abdomen soft;no masses;non-tender        Extremities and Muscular Skeletal:  no edema              Neurological:  no gross motor deficits        Psych:  Alert and Oriented x 3        CC  No referring provider defined for this encounter.    Thank you for allowing me to participate in the care of your patient.      Sincerely,     Edwin Meadows MD, MD     [facility]  "

## 2022-04-07 NOTE — TELEPHONE ENCOUNTER
[facility] Call Center    Phone Message    May a detailed message be left on voicemail: yes     Reason for Call: Other: Pt would like a call back as he was in an accident on his way to his appt and won't be able to make it and can't get another appt until August and he would like a call back to go over his results or see if he can be fit into a time slot     Action Taken: Message routed to:  [facility] ([facility] Cardio    Travel Screening: Not Applicable

## 2022-10-04 ENCOUNTER — TRANSFERRED RECORDS (OUTPATIENT)
Dept: HEALTH INFORMATION MANAGEMENT | Facility: CLINIC | Age: 82
End: 2022-10-04

## 2022-10-12 ENCOUNTER — HOSPITAL ENCOUNTER (OUTPATIENT)
Dept: CARDIOLOGY | Facility: CLINIC | Age: 82
Discharge: HOME | End: 2022-10-12
Attending: INTERNAL MEDICINE | Admitting: INTERNAL MEDICINE
Payer: MEDICARE

## 2022-10-12 DIAGNOSIS — I35.0 SEVERE AORTIC VALVE STENOSIS: ICD-10-CM

## 2022-10-12 LAB — LVEF ECHO: NORMAL

## 2022-10-12 PROCEDURE — 93306 TTE W/DOPPLER COMPLETE: CPT | Mod: 26 | Performed by: INTERNAL MEDICINE

## 2022-10-12 PROCEDURE — 255N000002 HC RX 255 OP 636: Performed by: INTERNAL MEDICINE

## 2022-10-12 PROCEDURE — 999N000208 ECHOCARDIOGRAM COMPLETE

## 2022-10-12 RX ADMIN — HUMAN ALBUMIN MICROSPHERES AND PERFLUTREN 9 ML: 10; .22 INJECTION, SOLUTION INTRAVENOUS at 14:20

## 2022-10-26 ENCOUNTER — OFFICE VISIT (OUTPATIENT)
Dept: CARDIOLOGY | Facility: CLINIC | Age: 82
End: 2022-10-26
Payer: MEDICARE

## 2022-10-26 VITALS
OXYGEN SATURATION: 96 % | WEIGHT: 198.7 LBS | BODY MASS INDEX: 28.45 KG/M2 | SYSTOLIC BLOOD PRESSURE: 136 MMHG | HEIGHT: 70 IN | DIASTOLIC BLOOD PRESSURE: 66 MMHG | HEART RATE: 83 BPM

## 2022-10-26 DIAGNOSIS — I35.0 SEVERE AORTIC VALVE STENOSIS: Primary | ICD-10-CM

## 2022-10-26 PROCEDURE — 99214 OFFICE O/P EST MOD 30 MIN: CPT | Performed by: INTERNAL MEDICINE

## 2022-10-26 NOTE — PATIENT INSTRUCTIONS
Next steps:  - CT scan - schedule today  - Coronary angiogram - will call to schedule   - Consult with cardiovascular surgeon    We will call you with the result(s) of the test(s) and to discuss the plan going forward.    It was wonderful to see you! Please call with any questions or concerns: [phone]    Carine Li RN  Structural Heart Coordinator  [facility]

## 2022-10-26 NOTE — NURSING NOTE
TAVR Coordinator visit:  Provided additional education regarding TAVR procedure, after being present for discussion with physician. Explained the work-up process and next steps for patient. Patient provided our direct contact number and instructed to call with any questions.     Completed frailty testing and KCCQ.   5 meter walk: 3 seconds  Frailty score: 0/5    KCCQ Results:   1a. 5  1b. 5  1c. 3  2. 5  3. 4  4. 3  5. 5  6. 4  7. 4  8a. 5  8b. 4  8c. 5    Preliminary STS Risk Score: 1.661%  NYHA Class: II    Plan for TAVR CT on 11/3. ill proceed with coronary angiogram if indicated after reviewing CT results.     Carine Li RN  Structural Heart Coordinator  [facility]

## 2022-10-26 NOTE — LETTER
10/26/2022    MICHELLE R ROLAND  [facility] [address]  [facility] [address]    RE: Reynaldo Monteiro       Dear Colleague,     I had the pleasure of seeing Reynaldo Monteiro in the [facility].  REASON FOR CONSULTATION:  Aortic stenosis.    REFERRING PHYSICIAN:  Yue Villatoro MD    HISTORY OF PRESENT ILLNESS:  I had the pleasure of seeing Reynaldo Monteiro at the [facility] in [place] this morning.  He is a very pleasant 82-year-old gentleman with known aortic stenosis, type 2 diabetes, hypertension and hyperlipidemia.    He has been followed very closely with regards to his aortic stenosis.  He has had multiple prior echocardiograms, which I have personally reviewed.  Echocardiogram from 09/2018 suggested moderate to severe aortic valve stenosis with a mean gradient of 39 mmHg and valve area of 1.2 cm2.  A few months later, he had a transesophageal echocardiogram to get a closer look at the valve.  A SKYLER showed a peak velocity of 3.3 cm per second with a gradient of 24 and valve area of 1.2 cm2 by planimetry. He was then referred for exercise stress test.  He was able to exercise 9 minutes on a standard Jesus protocol.  He had no symptoms and no evidence of arrhythmia or inducible ischemia.  Echocardiogram performed in 2019 again suggested moderate to severe AS with a mean gradient of 33 mmHg and valve area of 1.3.  He did not endorse any significant symptoms therefore I recommended repeat echo in 3 months and follow-up.    I last saw him in April of this year. Echocardiogram performed at that time revealed mean gradient of 34 mmHg,  valve area of 1.0 cm  and V-max of 3.8 m/s.  His dimensionless index was 0.26. He was not having symptoms there fore I recommended repeat echo and follow up in 6 months     He now reports dyspnea on exertion over the past several months. No palpitations, presyncope or syncope. No chest pain.       ASSESSMENT AND PLAN:  A very pleasant 82-year-old  gentleman with a known aortic stenosis. I reviewed his echocardiogram from few days ago.  His LV function is preserved. The mean gradient across the valve is 35 mmHg. His valve area is 0.95 cm  and his V-max is 4.1 m/s. His dimensionless index is 0.23. Overall the echo findings are now consistent with severe aortic stenosis.    He develops symptoms in the form of shortness of breath since her last visit 6 months ago.  Given symptoms, progression of aortic stenosis which is now severe, I would recommend aortic valve replacement.  Due to advanced age, he would be an ideal candidate for TAVR.  We will proceed with CT TAVR to make sure he does not have any anatomical contraindications.  We will subsequently complete the work-up with coronary angiography and presented in our multidisciplinary conference.    It was pleasure seeing  in the clinic this morning  I appreciate the opportunity to participate in his care.    Edwin Meadows MD      Today's clinic visit entailed:  30 minutes spent on the date of the encounter doing chart review, history and exam, documentation and further activities per the note  Provider  Link to [facility] Help Grid         Orders Placed This Encounter   Procedures     CT Angiogram TAVR       No orders of the defined types were placed in this encounter.      There are no discontinued medications.      Encounter Diagnosis   Name Primary?     Severe aortic valve stenosis Yes       CURRENT MEDICATIONS:  Current Outpatient Medications   Medication Sig Dispense Refill     aspirin (ASA) 325 MG EC tablet Take 325 mg by mouth daily       B Complex-Folic Acid (SUPER B COMPLEX MAXI PO)        diltiazem ER COATED BEADS (CARDIZEM CD/CARTIA XT) 240 MG 24 hr capsule Take by mouth daily       glucosamine 500 MG CAPS capsule Take 500 mg by mouth daily       JARDIANCE 10 MG TABS tablet 10 mg daily        Metoprolol Succinate 25 MG CS24 Take 25 mg by mouth       rosuvastatin (CRESTOR) 20 MG tablet Take 10 mg by  "mouth daily       VITAMIN D, CHOLECALCIFEROL, PO Take 2,000 Units by mouth daily       vitamin E 400 UNIT capsule Take 400 Units by mouth daily         ALLERGIES   No Known Allergies    PAST MEDICAL HISTORY:  Past Medical History:   Diagnosis Date     Aortic valve stenosis      Esophagitis, reflux      Hyperlipidemia      Hypertension      Lumbar disc disorder with myelopathy     bilateral leg weakness     RBBB (right bundle branch block)      Severe aortic valve stenosis      Type 2 diabetes mellitus (H)        PAST SURGICAL HISTORY:  Past Surgical History:   Procedure Laterality Date     ANGIOGRAM      Patient reports coronary angiogram at [place], [place] approx 10 years ago       FAMILY HISTORY:  Family History   Problem Relation Age of Onset     Hypertension Mother         valve replacement      Coronary Artery Disease Father        SOCIAL HISTORY:  Social History     Socioeconomic History     Marital status:      Spouse name: Karissa     Number of children: None     Years of education: None     Highest education level: None   Occupational History     Occupation: Small business owner   Tobacco Use     Smoking status: Never     Smokeless tobacco: Never   Substance and Sexual Activity     Alcohol use: Yes     Comment: 1-2 a week      Drug use: No       Review of Systems:  Skin:  not assessed       Eyes:  not assessed      ENT:  not assessed      Respiratory:  Negative       Cardiovascular:  Negative      Gastroenterology: not assessed      Genitourinary:  not assessed      Musculoskeletal:  not assessed      Neurologic:  not assessed      Psychiatric:  not assessed      Heme/Lymph/Imm:  not assessed      Endocrine:  not assessed        Physical Exam:  Vitals: /66   Pulse 83   Ht 1.778 m (5' 10\")   Wt 90.1 kg (198 lb 11.2 oz)   SpO2 96%   BMI 28.51 kg/m      Constitutional:  cooperative;in no acute distress        Skin:  warm and dry to the touch          Head:  normocephalic        Eyes:  " conjunctivae and lids unremarkable        Lymph:      ENT:  no pallor or cyanosis        Neck:  JVP normal;no carotid bruit        Respiratory:            Cardiac: regular rhythm       grade 2;systolic ejection murmur        pulses full and equal                                        GI:  abdomen soft;no masses;non-tender        Extremities and Muscular Skeletal:  no edema              Neurological:  no gross motor deficits        Psych:  Alert and Oriented x 3        CC  Edwin Meadows MD  [address]  [place] [address]    Thank you for allowing me to participate in the care of your patient.      Sincerely,     Edwin Meadows MD, MD     [facility]

## 2022-10-26 NOTE — PROGRESS NOTES
REASON FOR CONSULTATION:  Aortic stenosis.    REFERRING PHYSICIAN:  Yue Villatoro MD    HISTORY OF PRESENT ILLNESS:  I had the pleasure of seeing Reynaldo Monteiro at the [facility] in [place] this morning.  He is a very pleasant 82-year-old gentleman with known aortic stenosis, type 2 diabetes, hypertension and hyperlipidemia.    He has been followed very closely with regards to his aortic stenosis.  He has had multiple prior echocardiograms, which I have personally reviewed.  Echocardiogram from 09/2018 suggested moderate to severe aortic valve stenosis with a mean gradient of 39 mmHg and valve area of 1.2 cm2.  A few months later, he had a transesophageal echocardiogram to get a closer look at the valve.  A SKYLER showed a peak velocity of 3.3 cm per second with a gradient of 24 and valve area of 1.2 cm2 by planimetry. He was then referred for exercise stress test.  He was able to exercise 9 minutes on a standard Jesus protocol.  He had no symptoms and no evidence of arrhythmia or inducible ischemia.  Echocardiogram performed in 2019 again suggested moderate to severe AS with a mean gradient of 33 mmHg and valve area of 1.3.  He did not endorse any significant symptoms therefore I recommended repeat echo in 3 months and follow-up.    I last saw him in April of this year. Echocardiogram performed at that time revealed mean gradient of 34 mmHg,  valve area of 1.0 cm  and V-max of 3.8 m/s.  His dimensionless index was 0.26. He was not having symptoms there fore I recommended repeat echo and follow up in 6 months     He now reports dyspnea on exertion over the past several months. No palpitations, presyncope or syncope. No chest pain.       ASSESSMENT AND PLAN:  A very pleasant 82-year-old gentleman with a known aortic stenosis. I reviewed his echocardiogram from few days ago.  His LV function is preserved. The mean gradient across the valve is 35 mmHg. His valve area is 0.95 cm  and his V-max is 4.1 m/s. His  dimensionless index is 0.23. Overall the echo findings are now consistent with severe aortic stenosis.    He develops symptoms in the form of shortness of breath since her last visit 6 months ago.  Given symptoms, progression of aortic stenosis which is now severe, I would recommend aortic valve replacement.  Due to advanced age, he would be an ideal candidate for TAVR.  We will proceed with CT TAVR to make sure he does not have any anatomical contraindications.  We will subsequently complete the work-up with coronary angiography and presented in our multidisciplinary conference.    It was pleasure seeing  in the clinic this morning  I appreciate the opportunity to participate in his care.    Edwin Meadows MD      Today's clinic visit entailed:  30 minutes spent on the date of the encounter doing chart review, history and exam, documentation and further activities per the note  Provider  Link to [facility] Help Grid         Orders Placed This Encounter   Procedures     CT Angiogram TAVR       No orders of the defined types were placed in this encounter.      There are no discontinued medications.      Encounter Diagnosis   Name Primary?     Severe aortic valve stenosis Yes       CURRENT MEDICATIONS:  Current Outpatient Medications   Medication Sig Dispense Refill     aspirin (ASA) 325 MG EC tablet Take 325 mg by mouth daily       B Complex-Folic Acid (SUPER B COMPLEX MAXI PO)        diltiazem ER COATED BEADS (CARDIZEM CD/CARTIA XT) 240 MG 24 hr capsule Take by mouth daily       glucosamine 500 MG CAPS capsule Take 500 mg by mouth daily       JARDIANCE 10 MG TABS tablet 10 mg daily        Metoprolol Succinate 25 MG CS24 Take 25 mg by mouth       rosuvastatin (CRESTOR) 20 MG tablet Take 10 mg by mouth daily       VITAMIN D, CHOLECALCIFEROL, PO Take 2,000 Units by mouth daily       vitamin E 400 UNIT capsule Take 400 Units by mouth daily         ALLERGIES   No Known Allergies    PAST MEDICAL HISTORY:  Past Medical  "History:   Diagnosis Date     Aortic valve stenosis      Esophagitis, reflux      Hyperlipidemia      Hypertension      Lumbar disc disorder with myelopathy     bilateral leg weakness     RBBB (right bundle branch block)      Severe aortic valve stenosis      Type 2 diabetes mellitus (H)        PAST SURGICAL HISTORY:  Past Surgical History:   Procedure Laterality Date     ANGIOGRAM      Patient reports coronary angiogram at [place], [place] approx 10 years ago       FAMILY HISTORY:  Family History   Problem Relation Age of Onset     Hypertension Mother         valve replacement      Coronary Artery Disease Father        SOCIAL HISTORY:  Social History     Socioeconomic History     Marital status:      Spouse name: Karissa     Number of children: None     Years of education: None     Highest education level: None   Occupational History     Occupation: Small business owner   Tobacco Use     Smoking status: Never     Smokeless tobacco: Never   Substance and Sexual Activity     Alcohol use: Yes     Comment: 1-2 a week      Drug use: No       Review of Systems:  Skin:  not assessed       Eyes:  not assessed      ENT:  not assessed      Respiratory:  Negative       Cardiovascular:  Negative      Gastroenterology: not assessed      Genitourinary:  not assessed      Musculoskeletal:  not assessed      Neurologic:  not assessed      Psychiatric:  not assessed      Heme/Lymph/Imm:  not assessed      Endocrine:  not assessed        Physical Exam:  Vitals: /66   Pulse 83   Ht 1.778 m (5' 10\")   Wt 90.1 kg (198 lb 11.2 oz)   SpO2 96%   BMI 28.51 kg/m      Constitutional:  cooperative;in no acute distress        Skin:  warm and dry to the touch          Head:  normocephalic        Eyes:  conjunctivae and lids unremarkable        Lymph:      ENT:  no pallor or cyanosis        Neck:  JVP normal;no carotid bruit        Respiratory:            Cardiac: regular rhythm       grade 2;systolic ejection murmur      "   pulses full and equal                                        GI:  abdomen soft;no masses;non-tender        Extremities and Muscular Skeletal:  no edema              Neurological:  no gross motor deficits        Psych:  Alert and Oriented x 3        CC  Edwin Meadows MD  [address]  [place] [id number]

## 2022-11-03 ENCOUNTER — HOSPITAL ENCOUNTER (OUTPATIENT)
Dept: CARDIOLOGY | Facility: CLINIC | Age: 82
Discharge: HOME | End: 2022-11-03
Attending: INTERNAL MEDICINE | Admitting: INTERNAL MEDICINE
Payer: MEDICARE

## 2022-11-03 VITALS — SYSTOLIC BLOOD PRESSURE: 131 MMHG | HEART RATE: 85 BPM | DIASTOLIC BLOOD PRESSURE: 45 MMHG

## 2022-11-03 DIAGNOSIS — I35.0 SEVERE AORTIC VALVE STENOSIS: ICD-10-CM

## 2022-11-03 PROCEDURE — 74174 CTA ABD&PLVS W/CONTRAST: CPT | Mod: 26 | Performed by: INTERNAL MEDICINE

## 2022-11-03 PROCEDURE — 74174 CTA ABD&PLVS W/CONTRAST: CPT | Mod: MG

## 2022-11-03 PROCEDURE — 71275 CT ANGIOGRAPHY CHEST: CPT | Mod: 26 | Performed by: INTERNAL MEDICINE

## 2022-11-03 PROCEDURE — G1010 CDSM STANSON: HCPCS | Performed by: INTERNAL MEDICINE

## 2022-11-03 PROCEDURE — 250N000011 HC RX IP 250 OP 636: Performed by: INTERNAL MEDICINE

## 2022-11-03 RX ORDER — METHYLPREDNISOLONE SODIUM SUCCINATE 125 MG/2ML
125 INJECTION, POWDER, LYOPHILIZED, FOR SOLUTION INTRAMUSCULAR; INTRAVENOUS
Status: DISCONTINUED | OUTPATIENT
Start: 2022-11-03 | End: 2022-11-04 | Stop reason: HOSPADM

## 2022-11-03 RX ORDER — ACYCLOVIR 200 MG/1
0-1 CAPSULE ORAL
Status: DISCONTINUED | OUTPATIENT
Start: 2022-11-03 | End: 2022-11-04 | Stop reason: HOSPADM

## 2022-11-03 RX ORDER — ONDANSETRON 2 MG/ML
4 INJECTION INTRAMUSCULAR; INTRAVENOUS
Status: DISCONTINUED | OUTPATIENT
Start: 2022-11-03 | End: 2022-11-04 | Stop reason: HOSPADM

## 2022-11-03 RX ORDER — IOPAMIDOL 755 MG/ML
500 INJECTION, SOLUTION INTRAVASCULAR ONCE
Status: COMPLETED | OUTPATIENT
Start: 2022-11-03 | End: 2022-11-03

## 2022-11-03 RX ORDER — DIPHENHYDRAMINE HYDROCHLORIDE 50 MG/ML
25-50 INJECTION INTRAMUSCULAR; INTRAVENOUS
Status: DISCONTINUED | OUTPATIENT
Start: 2022-11-03 | End: 2022-11-04 | Stop reason: HOSPADM

## 2022-11-03 RX ORDER — DIPHENHYDRAMINE HCL 25 MG
25 CAPSULE ORAL
Status: DISCONTINUED | OUTPATIENT
Start: 2022-11-03 | End: 2022-11-04 | Stop reason: HOSPADM

## 2022-11-03 RX ADMIN — IOPAMIDOL 115 ML: 755 INJECTION, SOLUTION INTRAVENOUS at 11:58

## 2022-11-08 ENCOUNTER — CARE COORDINATION (OUTPATIENT)
Dept: CARDIOLOGY | Facility: CLINIC | Age: 82
End: 2022-11-08

## 2022-11-08 DIAGNOSIS — I35.0 AORTIC STENOSIS: Primary | ICD-10-CM

## 2022-11-08 DIAGNOSIS — R93.1 ABNORMAL FINDINGS ON DIAGNOSTIC IMAGING OF HEART/CORONARY CIRCULATION: ICD-10-CM

## 2022-11-08 NOTE — PROGRESS NOTES
Reviewed CT TAVR with Zeenat Fox NP. Plan to proceed with coronary angiogram. Above recommendation was called to pt, verbalized understanding and agreed.    RAAD ManN, RN, PHN, CHFN, HNB-BC   11/8/2022 at 10:58 AM

## 2022-11-23 ENCOUNTER — CARE COORDINATION (OUTPATIENT)
Dept: CARDIOLOGY | Facility: CLINIC | Age: 82
End: 2022-11-23

## 2022-11-23 DIAGNOSIS — I35.0 AORTIC STENOSIS: Primary | ICD-10-CM

## 2022-11-23 RX ORDER — POTASSIUM CHLORIDE 1500 MG/1
20 TABLET, EXTENDED RELEASE ORAL
Status: CANCELLED | OUTPATIENT
Start: 2022-11-23

## 2022-11-23 RX ORDER — SODIUM CHLORIDE 9 MG/ML
INJECTION, SOLUTION INTRAVENOUS CONTINUOUS
Status: CANCELLED | OUTPATIENT
Start: 2022-11-23

## 2022-11-23 RX ORDER — LIDOCAINE 40 MG/G
CREAM TOPICAL
Status: CANCELLED | OUTPATIENT
Start: 2022-11-23

## 2022-11-23 RX ORDER — ASPIRIN 325 MG
325 TABLET ORAL ONCE
Status: CANCELLED | OUTPATIENT
Start: 2022-11-23 | End: 2022-11-23

## 2022-11-23 RX ORDER — ASPIRIN 81 MG/1
243 TABLET, CHEWABLE ORAL ONCE
Status: CANCELLED | OUTPATIENT
Start: 2022-11-23

## 2022-11-23 NOTE — PROGRESS NOTES
Coronary angiogram/PCI/Right Heart Cath prep instructions.     Patient is scheduled for a Coronary Angio w/possible PCI at [facility] - [address], [place] [address] - Main Entrance of the Hospital, on 11/29/22.  Check in time is at 0830 and procedure to follow.    Patient instructed to remain NPO for solid foods 8 hours prior to arrival and may have clear liquids up to 2 hours prior to arrival.    Patient does not require extra fluids prior to procedure.    Patient is not on anticoagulation.    Patient is taking ASA 325mg and should continue daily dosing.    Pt is on Jardiance and should hold it for 3 days prior to procedure.    Patient advised to take their other daily medications the morning of the procedure with small sips of water.     Verified patient does not have a contrast allergy.    Verified patient has someone available to drive them home from the hospital and can stay with them for 24 hours after the procedure.     Patient aware that a negative COVID test result is mandatory prior to procedure and should bring result with them the morning of the procedure if not completed at a [place] location.    Patient will check their temperature the morning of procedure and call [facility] at [phone] if temp is >100.0.    Patient is aware of visitor policy.    Patient expresses understanding of above instructions and denies further questions at this time.    Carine Li, RAADN, RN, PHN, CHFN, HNB-BC   11/23/2022 at 2:10 PM

## 2022-11-29 ENCOUNTER — HOSPITAL ENCOUNTER (OUTPATIENT)
Facility: CLINIC | Age: 82
Discharge: HOME | End: 2022-11-29
Admitting: INTERNAL MEDICINE
Payer: MEDICARE

## 2022-11-29 VITALS
OXYGEN SATURATION: 96 % | BODY MASS INDEX: 27.33 KG/M2 | SYSTOLIC BLOOD PRESSURE: 127 MMHG | DIASTOLIC BLOOD PRESSURE: 77 MMHG | TEMPERATURE: 98.2 F | WEIGHT: 195.2 LBS | RESPIRATION RATE: 16 BRPM | HEART RATE: 61 BPM | HEIGHT: 71 IN

## 2022-11-29 DIAGNOSIS — I35.0 AORTIC STENOSIS: ICD-10-CM

## 2022-11-29 DIAGNOSIS — R93.1 ABNORMAL FINDINGS ON DIAGNOSTIC IMAGING OF HEART/CORONARY CIRCULATION: Primary | ICD-10-CM

## 2022-11-29 PROBLEM — Z98.890 STATUS POST CORONARY ANGIOGRAM: Status: ACTIVE | Noted: 2022-11-29

## 2022-11-29 LAB
ANION GAP SERPL CALCULATED.3IONS-SCNC: 7 MMOL/L (ref 3–14)
APTT PPP: 26 SECONDS (ref 22–38)
BUN SERPL-MCNC: 19 MG/DL (ref 7–30)
CALCIUM SERPL-MCNC: 9.2 MG/DL (ref 8.5–10.1)
CHLORIDE BLD-SCNC: 107 MMOL/L (ref 94–109)
CO2 SERPL-SCNC: 26 MMOL/L (ref 20–32)
CREAT SERPL-MCNC: 0.83 MG/DL (ref 0.66–1.25)
ERYTHROCYTE [DISTWIDTH] IN BLOOD BY AUTOMATED COUNT: 13.2 % (ref 10–15)
GFR SERPL CREATININE-BSD FRML MDRD: 88 ML/MIN/1.73M2
GLUCOSE BLD-MCNC: 155 MG/DL (ref 70–99)
HCT VFR BLD AUTO: 51.5 % (ref 40–53)
HGB BLD-MCNC: 16.7 G/DL (ref 13.3–17.7)
INR PPP: 1.04 (ref 0.85–1.15)
MCH RBC QN AUTO: 29.6 PG (ref 26.5–33)
MCHC RBC AUTO-ENTMCNC: 32.4 G/DL (ref 31.5–36.5)
MCV RBC AUTO: 91 FL (ref 78–100)
PLATELET # BLD AUTO: 168 10E3/UL (ref 150–450)
POTASSIUM BLD-SCNC: 4.3 MMOL/L (ref 3.4–5.3)
RBC # BLD AUTO: 5.64 10E6/UL (ref 4.4–5.9)
SODIUM SERPL-SCNC: 140 MMOL/L (ref 133–144)
WBC # BLD AUTO: 7.3 10E3/UL (ref 4–11)

## 2022-11-29 PROCEDURE — 250N000013 HC RX MED GY IP 250 OP 250 PS 637: Performed by: INTERNAL MEDICINE

## 2022-11-29 PROCEDURE — 85027 COMPLETE CBC AUTOMATED: CPT | Performed by: INTERNAL MEDICINE

## 2022-11-29 PROCEDURE — 93005 ELECTROCARDIOGRAM TRACING: CPT

## 2022-11-29 PROCEDURE — 250N000011 HC RX IP 250 OP 636: Performed by: INTERNAL MEDICINE

## 2022-11-29 PROCEDURE — 250N000009 HC RX 250: Performed by: INTERNAL MEDICINE

## 2022-11-29 PROCEDURE — 999N000184 HC STATISTIC TELEMETRY

## 2022-11-29 PROCEDURE — 93454 CORONARY ARTERY ANGIO S&I: CPT | Mod: 26 | Performed by: INTERNAL MEDICINE

## 2022-11-29 PROCEDURE — 36591 DRAW BLOOD OFF VENOUS DEVICE: CPT

## 2022-11-29 PROCEDURE — 999N000054 HC STATISTIC EKG NON-CHARGEABLE

## 2022-11-29 PROCEDURE — 36415 COLL VENOUS BLD VENIPUNCTURE: CPT | Performed by: INTERNAL MEDICINE

## 2022-11-29 PROCEDURE — 99152 MOD SED SAME PHYS/QHP 5/>YRS: CPT | Performed by: INTERNAL MEDICINE

## 2022-11-29 PROCEDURE — 93010 ELECTROCARDIOGRAM REPORT: CPT | Performed by: INTERNAL MEDICINE

## 2022-11-29 PROCEDURE — 93454 CORONARY ARTERY ANGIO S&I: CPT | Performed by: INTERNAL MEDICINE

## 2022-11-29 PROCEDURE — 999N000071 HC STATISTIC HEART CATH LAB OR EP LAB

## 2022-11-29 PROCEDURE — C1887 CATHETER, GUIDING: HCPCS | Performed by: INTERNAL MEDICINE

## 2022-11-29 PROCEDURE — 85730 THROMBOPLASTIN TIME PARTIAL: CPT | Performed by: INTERNAL MEDICINE

## 2022-11-29 PROCEDURE — 80048 BASIC METABOLIC PNL TOTAL CA: CPT | Performed by: INTERNAL MEDICINE

## 2022-11-29 PROCEDURE — C1894 INTRO/SHEATH, NON-LASER: HCPCS | Performed by: INTERNAL MEDICINE

## 2022-11-29 PROCEDURE — 258N000003 HC RX IP 258 OP 636: Performed by: INTERNAL MEDICINE

## 2022-11-29 PROCEDURE — 85610 PROTHROMBIN TIME: CPT | Performed by: INTERNAL MEDICINE

## 2022-11-29 PROCEDURE — 99152 MOD SED SAME PHYS/QHP 5/>YRS: CPT | Mod: GC | Performed by: INTERNAL MEDICINE

## 2022-11-29 PROCEDURE — 272N000001 HC OR GENERAL SUPPLY STERILE: Performed by: INTERNAL MEDICINE

## 2022-11-29 RX ORDER — ATROPINE SULFATE 0.1 MG/ML
0.5 INJECTION INTRAVENOUS
Status: DISCONTINUED | OUTPATIENT
Start: 2022-11-29 | End: 2022-11-29 | Stop reason: HOSPADM

## 2022-11-29 RX ORDER — SODIUM CHLORIDE 9 MG/ML
INJECTION, SOLUTION INTRAVENOUS CONTINUOUS
Status: DISCONTINUED | OUTPATIENT
Start: 2022-11-29 | End: 2022-11-29 | Stop reason: HOSPADM

## 2022-11-29 RX ORDER — IOPAMIDOL 755 MG/ML
INJECTION, SOLUTION INTRAVASCULAR
Status: DISCONTINUED | OUTPATIENT
Start: 2022-11-29 | End: 2022-11-29 | Stop reason: HOSPADM

## 2022-11-29 RX ORDER — NALOXONE HYDROCHLORIDE 0.4 MG/ML
0.4 INJECTION, SOLUTION INTRAMUSCULAR; INTRAVENOUS; SUBCUTANEOUS
Status: DISCONTINUED | OUTPATIENT
Start: 2022-11-29 | End: 2022-11-29 | Stop reason: HOSPADM

## 2022-11-29 RX ORDER — ASPIRIN 325 MG
325 TABLET ORAL ONCE
Status: COMPLETED | OUTPATIENT
Start: 2022-11-29 | End: 2022-11-29

## 2022-11-29 RX ORDER — NALOXONE HYDROCHLORIDE 0.4 MG/ML
0.2 INJECTION, SOLUTION INTRAMUSCULAR; INTRAVENOUS; SUBCUTANEOUS
Status: DISCONTINUED | OUTPATIENT
Start: 2022-11-29 | End: 2022-11-29 | Stop reason: HOSPADM

## 2022-11-29 RX ORDER — LIDOCAINE 40 MG/G
CREAM TOPICAL
Status: DISCONTINUED | OUTPATIENT
Start: 2022-11-29 | End: 2022-11-29 | Stop reason: HOSPADM

## 2022-11-29 RX ORDER — ASPIRIN 81 MG/1
243 TABLET, CHEWABLE ORAL ONCE
Status: COMPLETED | OUTPATIENT
Start: 2022-11-29 | End: 2022-11-29

## 2022-11-29 RX ORDER — FENTANYL CITRATE 50 UG/ML
INJECTION, SOLUTION INTRAMUSCULAR; INTRAVENOUS
Status: DISCONTINUED | OUTPATIENT
Start: 2022-11-29 | End: 2022-11-29 | Stop reason: HOSPADM

## 2022-11-29 RX ORDER — FENTANYL CITRATE 50 UG/ML
25 INJECTION, SOLUTION INTRAMUSCULAR; INTRAVENOUS
Status: DISCONTINUED | OUTPATIENT
Start: 2022-11-29 | End: 2022-11-29 | Stop reason: HOSPADM

## 2022-11-29 RX ORDER — ACETAMINOPHEN 325 MG/1
650 TABLET ORAL EVERY 4 HOURS PRN
Status: DISCONTINUED | OUTPATIENT
Start: 2022-11-29 | End: 2022-11-29 | Stop reason: HOSPADM

## 2022-11-29 RX ORDER — HEPARIN SODIUM 1000 [USP'U]/ML
INJECTION, SOLUTION INTRAVENOUS; SUBCUTANEOUS
Status: DISCONTINUED | OUTPATIENT
Start: 2022-11-29 | End: 2022-11-29 | Stop reason: HOSPADM

## 2022-11-29 RX ORDER — FLUMAZENIL 0.1 MG/ML
0.2 INJECTION, SOLUTION INTRAVENOUS
Status: DISCONTINUED | OUTPATIENT
Start: 2022-11-29 | End: 2022-11-29 | Stop reason: HOSPADM

## 2022-11-29 RX ORDER — VERAPAMIL HYDROCHLORIDE 2.5 MG/ML
INJECTION, SOLUTION INTRAVENOUS
Status: DISCONTINUED | OUTPATIENT
Start: 2022-11-29 | End: 2022-11-29 | Stop reason: HOSPADM

## 2022-11-29 RX ORDER — NITROGLYCERIN 5 MG/ML
VIAL (ML) INTRAVENOUS
Status: DISCONTINUED | OUTPATIENT
Start: 2022-11-29 | End: 2022-11-29 | Stop reason: HOSPADM

## 2022-11-29 RX ORDER — OXYCODONE HYDROCHLORIDE 5 MG/1
10 TABLET ORAL EVERY 4 HOURS PRN
Status: DISCONTINUED | OUTPATIENT
Start: 2022-11-29 | End: 2022-11-29 | Stop reason: HOSPADM

## 2022-11-29 RX ORDER — POTASSIUM CHLORIDE 1500 MG/1
20 TABLET, EXTENDED RELEASE ORAL
Status: DISCONTINUED | OUTPATIENT
Start: 2022-11-29 | End: 2022-11-29 | Stop reason: HOSPADM

## 2022-11-29 RX ORDER — OXYCODONE HYDROCHLORIDE 5 MG/1
5 TABLET ORAL EVERY 4 HOURS PRN
Status: DISCONTINUED | OUTPATIENT
Start: 2022-11-29 | End: 2022-11-29 | Stop reason: HOSPADM

## 2022-11-29 RX ADMIN — ASPIRIN 325 MG ORAL TABLET 325 MG: 325 PILL ORAL at 08:59

## 2022-11-29 RX ADMIN — SODIUM CHLORIDE: 9 INJECTION, SOLUTION INTRAVENOUS at 08:23

## 2022-11-29 ASSESSMENT — ACTIVITIES OF DAILY LIVING (ADL)
ADLS_ACUITY_SCORE: 35

## 2022-11-29 NOTE — PROGRESS NOTES
Care Suites Discharge Nursing Note    Patient Information  Name: Reynaldo Monteiro  Age: 82 year old    Discharge Education:  Discharge instructions reviewed: Yes  Additional education/resources provided: na  Patient/patient representative verbalizes understanding: Yes  Patient discharging on new medications: No  Medication education completed: N/A    Discharge Plans:   Discharge location: home  Discharge ride contacted: Yes  Approximate discharge time:1415    Discharge Criteria:  Discharge criteria met and vital signs stable: Yes    Patient Belongs:  Patient belongings returned to patient: Yes    Harriett Ratliff RN

## 2022-11-29 NOTE — DISCHARGE INSTRUCTIONS
Cardiac Angiogram Discharge Instructions - Radial    After you go home:    Have an adult stay with you until tomorrow.  Drink extra fluids for 2 days.  You may resume your normal diet.  No smoking       For 24 hours - due to the sedation you received:  Relax and take it easy.  Do NOT make any important or legal decisions.  Do NOT drive or operate machines at home or at work.  Do NOT drink alcohol.    Care of Wrist Puncture Site:    For the first 24 hrs - check the puncture site every 1-2 hours while awake.  It is normal to have soreness at the puncture site and mild tingling in your hand for up to 3 days.  Remove the bandaid after 24 hours. If there is minor oozing, apply another bandaid and remove it after 12 hours.  You may shower tomorrow.  Do NOT take a bath, or use a hot tub or pool for at least 3 days. Do NOT scrub the site. Do not use lotion or powder near the puncture site.           Activity:        For 2 days:   do not use your hand or arm to support your weight (such as rising from a chair)   do not bend your wrist (such as lifting a garage door).  do not lift more than 5 pounds or exercise your arm (such as tennis, golf or bowling).  Do NOT do any heavy activity such as exercise, lifting, or straining.     Bleeding:    If you start bleeding from the site in your wrist, sit down and press firmly on/above the site for 10 minutes.   Once bleeding stops, keep arm still for 2 hours.   Call [facility] as soon as you can.       Call 911 right away if you have heavy bleeding or bleeding that does not stop.      Medicines:    Take your medications, including blood thinners, unless your provider tells you not to.    If you have stopped any medicines, check with your provider about when to restart them.    Follow Up Appointments:    Follow up with [facility] Nurse Practitioner at [facility] of patient preference in 7-10 days.    Call the clinic if:    You have a large or growing hard lump around the site.  The  site is red, swollen, hot or tender.  Blood or fluid is draining from the site.  You have chills or a fever greater than 101 F (38 C).  Your arm feels numb, cool or changes color.  You have hives, a rash or unusual itching.  Any questions or concerns.          [facility] [place]:    [phone] [facility] (7 days a week)

## 2022-11-29 NOTE — PROGRESS NOTES
1107 Report received from Harriett Ratliff RN.  1110 Pt returned from Cath Lab @ 1105. Drowsy, but appropriate to verbal stimuli. TR band intact to right wrist.  No oozing or hematoma noted. Area soft & flat. Armboard intact. Right arm elevated on pillows. Pt denies pain. Pt instructed on activity restrictions with right wrist/arm. Verbal understanding received from pt. No family present at this time.   1207 Report given to Harriett Ratliff RN.

## 2022-11-29 NOTE — Clinical Note
Prepped: right groin and right radial. Prepped with: ChloraPrep. The patient was draped. . [General Appearance - Alert] : alert [General Appearance - In No Acute Distress] : in no acute distress [Oriented To Time, Place, And Person] : oriented to person, place, and time [Impaired Insight] : insight and judgment were intact [Affect] : the affect was normal [Person] : oriented to person [Place] : oriented to place [Time] : oriented to time [Short Term Intact] : short term memory intact [Concentration Intact] : normal concentrating ability [Visual Intact] : visual attention was ~T not ~L decreased [Naming Objects] : no difficulty naming common objects [Repeating Phrases] : no difficulty repeating a phrase [Fluency] : fluency intact [Reading] : reading intact [Cranial Nerves Optic (II)] : visual acuity intact bilaterally,  visual fields full to confrontation, pupils equal round and reactive to light [Cranial Nerves Oculomotor (III)] : extraocular motion intact [Cranial Nerves Trigeminal (V)] : facial sensation intact symmetrically [Cranial Nerves Facial (VII)] : face symmetrical [Cranial Nerves Vestibulocochlear (VIII)] : hearing was intact bilaterally [Cranial Nerves Glossopharyngeal (IX)] : tongue and palate midline [Cranial Nerves Accessory (XI - Cranial And Spinal)] : head turning and shoulder shrug symmetric [Cranial Nerves Hypoglossal (XII)] : there was no tongue deviation with protrusion [Motor Tone] : muscle tone was normal in all four extremities [Motor Strength] : muscle strength was normal in all four extremities [No Muscle Atrophy] : normal bulk in all four extremities [4] : thumb extension 4/5 [5] : ankle plantar flexion 5/5 [Sensation Tactile Decrease] : light touch was intact [2+] : Ankle jerk left 2+ [Sclera] : the sclera and conjunctiva were normal [PERRL With Normal Accommodation] : pupils were equal in size, round, reactive to light, with normal accommodation [Outer Ear] : the ears and nose were normal in appearance [Oropharynx] : the oropharynx was normal [] : no respiratory distress [Heart Rate And Rhythm] : heart rate was normal and rhythm regular [Edema] : there was no peripheral edema [Bowel Sounds] : normal bowel sounds [Skin Color & Pigmentation] : normal skin color and pigmentation [Paresis Pronator Drift Right-Sided] : no pronator drift on the right [Paresis Pronator Drift Left-Sided] : no pronator drift on the left [Past-pointing] : there was no past-pointing [Tremor] : no tremor present [Dysdiadochokinesia On The Left] : not present on the left side [Coordination - Dysmetria Impaired Finger-to-Nose Left Only] : not present on the left side [Coordination Dysmetria Impaired Heel-to-Shin Using Left Heel] : not present on the left side [Plantar Reflex Right Only] : normal on the right [Plantar Reflex Left Only] : normal on the left [FreeTextEntry8] : decreased fine finger movement on the right with thumb and index

## 2022-11-29 NOTE — PROGRESS NOTES
Care Suites Admission Nursing Note    Patient Information  Name: Reynaldo Monteiro  Age: 82 year old  Reason for admission: heart cath  Care Suites arrival time: 0800    Visitor Information  Name: na  Informed of visitor restrictions: N/A  1 visitor allowed per patient   Visitor must screen negative for COVID symptoms   Visitor must wear a mask  Waiting rooms closed to visitors    Patient Admission/Assessment   Pre-procedure assessment complete: Yes  If abnormal assessment/labs, provider notified: N/A  NPO: Yes  Medications held per instructions/orders: N/A  Consent: obtained  If applicable, pregnancy test status: deferred  Patient oriented to room: Yes  Education/questions answered: Yes  Plan/other: proceed with procedure    Discharge Planning  Discharge name/phone number:  Willian Wang   Overnight post sedation caregiver: wife  Discharge location: home    Harriett Ratliff RN

## 2022-12-01 LAB
ATRIAL RATE - MUSE: 65 BPM
DIASTOLIC BLOOD PRESSURE - MUSE: NORMAL MMHG
INTERPRETATION ECG - MUSE: NORMAL
P AXIS - MUSE: 38 DEGREES
PR INTERVAL - MUSE: 172 MS
QRS DURATION - MUSE: 148 MS
QT - MUSE: 448 MS
QTC - MUSE: 465 MS
R AXIS - MUSE: -27 DEGREES
SYSTOLIC BLOOD PRESSURE - MUSE: NORMAL MMHG
T AXIS - MUSE: 26 DEGREES
VENTRICULAR RATE- MUSE: 65 BPM

## 2022-12-07 ENCOUNTER — OFFICE VISIT (OUTPATIENT)
Dept: CARDIOLOGY | Facility: CLINIC | Age: 82
End: 2022-12-07
Payer: MEDICARE

## 2022-12-07 VITALS
SYSTOLIC BLOOD PRESSURE: 134 MMHG | DIASTOLIC BLOOD PRESSURE: 75 MMHG | WEIGHT: 178.8 LBS | HEART RATE: 61 BPM | OXYGEN SATURATION: 96 % | BODY MASS INDEX: 24.94 KG/M2

## 2022-12-07 DIAGNOSIS — E78.5 HYPERLIPIDEMIA LDL GOAL <70: ICD-10-CM

## 2022-12-07 DIAGNOSIS — I10 BENIGN ESSENTIAL HYPERTENSION: ICD-10-CM

## 2022-12-07 DIAGNOSIS — I35.0 SEVERE AORTIC STENOSIS BY PRIOR ECHOCARDIOGRAM: ICD-10-CM

## 2022-12-07 DIAGNOSIS — I45.10 RBBB (RIGHT BUNDLE BRANCH BLOCK): ICD-10-CM

## 2022-12-07 DIAGNOSIS — Z98.890 S/P CORONARY ANGIOGRAM: Primary | ICD-10-CM

## 2022-12-07 PROCEDURE — 99214 OFFICE O/P EST MOD 30 MIN: CPT | Performed by: NURSE PRACTITIONER

## 2022-12-07 NOTE — PROGRESS NOTES
Cardiology Clinic Progress Note  Reynaldo Monteiro MRN# [id number]   YOB: 1940 Age: 82 year old     Primary cardiologist: Dr. Meadows     Reason for visit: s/p coronary angiogram     History of presenting illness:    Reynaldo Monteiro is a pleasant 82 year old patient with past medical history significant for type 2 diabetes, hypertension, hyperlipidemia, and severe aortic stenosis.     He has been followed closely by Dr. Meadows in regards to his aortic stenosis.  He has had multiple prior echocardiograms over the years.  Most recent echocardiogram showed progression of his aortic stenosis, now consistent with severe aortic stenosis characterized by valve area of 0.95 cm2, V-max 4.1 m/s, mean gradient of 35 mmHg, and a dimensionless index of 0.23.  His LV function remains preserved with an EF of 60 to 65%.  He underwent TAVR guided CT that showed favorable anatomy for transfemoral approach with an aortic valve calcium score of 1895.  More recently, he underwent coronary angiography on 11/29/2022 that showed nonobstructive coronary disease.    Today, Spencer is here for follow-up after his coronary angiogram.  He reports progressive exertional dyspnea over the last several months.  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  He denies any chest pain, syncope or near syncope, or palpitations.  He has no PND orthopnea.  He has no lower extremity edema.  His right radial site has healed well without any significant pain or bruising.    His blood pressure looks excellent today at 134/75.  EKG shows chronic RBBB.    I reviewed his labs from 11/29/2022 that show normal renal function and electrolytes.  CBC shows hemoglobin 16.7, normal platelet function.         Assessment and Plan:     ASSESSMENT:    1. Severe aortic stenosis. With progressive exertional dyspnea, being worked-up for TAVR.   2. Hypertension. Well-controlled on diltiazem  mg daily and Toprol XL 25 mg daily.    3. Hyperlipidemia. LDL 65 on rosuvastatin 10 mg daily.   4. Type 2 diabetes. On Jardiance, no A1C on file.   5. Chronic RBBB. Higher risk for PPM post-TAVR.       PLAN:     1. Patient appears stable from a cardiovascular standpoint, I have made no changes today.  2. We will present him at our upcoming interdisciplinary valve conference, then reach out to him regarding final details and timing of TAVR.  3. He will need to be seen by our cardiovascular surgeons to discuss bailout.         Zeenat Fox, DNP, APRN, CNP  Page: [phone] (8a-5p M-F)    Orders this Visit:  No orders of the defined types were placed in this encounter.    No orders of the defined types were placed in this encounter.    There are no discontinued medications.    Today's clinic visit entailed:  Review of the result(s) of each unique test - coronary angiogram, CT TAVR, EKG, labs, echo  Discussion of management or test interpretation with external physician/other qualified healthcare professional/appropriate source - Dr. eng  30 minutes spent on the date of the encounter doing chart review, review of test results, patient visit and documentation   Provider  Link to [facility] Help Grid     The level of medical decision making during this visit was of moderate complexity.           Review of Systems:     Review of Systems:  Skin:  not assessed     Eyes:  not assessed    ENT:  not assessed    Respiratory:  Negative    Cardiovascular:  Negative    Gastroenterology: not assessed    Genitourinary:  not assessed    Musculoskeletal:  not assessed    Neurologic:  not assessed    Psychiatric:  Negative    Heme/Lymph/Imm:  Negative    Endocrine:  Positive for diabetes            Physical Exam:   Vitals: /75 (BP Location: Left arm, Cuff Size: Adult Large)   Pulse 61   Wt 81.1 kg (178 lb 12.8 oz)   SpO2 96%   BMI 24.94 kg/m    Constitutional:  cooperative        Skin:  warm and dry to the touch        Head:  normocephalic        Eyes:  pupils  equal and round        ENT:  no pallor or cyanosis        Neck:  JVP normal        Chest:  normal breath sounds, clear to auscultation, normal A-P diameter, normal symmetry, normal respiratory excursion, no use of accessory muscles        Cardiac: regular rhythm;normal S1 and S2       grade 2;systolic ejection murmur          Abdomen:  abdomen soft        Vascular: pulses full and equal     2+                                Extremities and Back:  no edema        Neurological:  affect appropriate;no gross motor deficits             Medications:     Current Outpatient Medications   Medication Sig Dispense Refill     aspirin (ASA) 325 MG EC tablet Take 325 mg by mouth daily       B Complex-Folic Acid (SUPER B COMPLEX MAXI PO)        diltiazem ER COATED BEADS (CARDIZEM CD/CARTIA XT) 240 MG 24 hr capsule Take by mouth daily       glucosamine 500 MG CAPS capsule Take 500 mg by mouth daily       JARDIANCE 10 MG TABS tablet 10 mg daily        Metoprolol Succinate 25 MG CS24 Take 25 mg by mouth       rosuvastatin (CRESTOR) 20 MG tablet Take 20 mg by mouth daily 1/2 tablet daily.       VITAMIN D, CHOLECALCIFEROL, PO Take 2,000 Units by mouth daily       vitamin E 400 UNIT capsule Take 400 Units by mouth daily         Family History   Problem Relation Age of Onset     Hypertension Mother         valve replacement      Coronary Artery Disease Father        Social History     Socioeconomic History     Marital status:      Spouse name: Karissa     Number of children: Not on file     Years of education: Not on file     Highest education level: Not on file   Occupational History     Occupation: Small business owner   Tobacco Use     Smoking status: Never     Smokeless tobacco: Never   Substance and Sexual Activity     Alcohol use: Yes     Comment: 1-2 a week      Drug use: No     Sexual activity: Not on file   Other Topics Concern     Parent/sibling w/ CABG, MI or angioplasty before 65F 55M? Not Asked   Social History  Narrative     Not on file     Social Determinants of Health     Financial Resource Strain: Not on file   Food Insecurity: Not on file   Transportation Needs: Not on file   Physical Activity: Not on file   Stress: Not on file   Social Connections: Not on file   Intimate Partner Violence: Not on file   Housing Stability: Not on file            Past Medical History:     Past Medical History:   Diagnosis Date     Aortic valve stenosis      Esophagitis, reflux      Hyperlipidemia      Hypertension      Lumbar disc disorder with myelopathy     bilateral leg weakness     RBBB (right bundle branch block)      Severe aortic valve stenosis      Type 2 diabetes mellitus (H)               Past Surgical History:     Past Surgical History:   Procedure Laterality Date     ANGIOGRAM      Patient reports coronary angiogram at [place], [place] approx 10 years ago     CV CORONARY ANGIOGRAM N/A 11/29/2022    Procedure: Coronary Angiogram;  Surgeon: Edwin Meadows MD;  Location:  HEART CARDIAC CATH LAB              Allergies:   Patient has no known allergies.       Data:   All laboratory data reviewed:    Recent Labs   Lab Test 10/04/22  1000 04/17/19  0000 09/04/18  0000   LDL  --  37 40   HDL  --  39 41   NHDL  --  57 57   CHOL  --  96 98   TRIG 132 112 86       Lab Results   Component Value Date    WBC 7.3 11/29/2022    WBC 6.8 10/03/2019    RBC 5.64 11/29/2022    RBC 4.94 10/03/2019    HGB 16.7 11/29/2022    HGB 15.1 10/03/2019    HCT 51.5 11/29/2022    HCT 45.1 10/03/2019    MCV 91 11/29/2022    MCV 91 10/03/2019    MCH 29.6 11/29/2022    MCH 30.6 10/03/2019    MCHC 32.4 11/29/2022    MCHC 33.5 10/03/2019    RDW 13.2 11/29/2022    RDW 12.8 10/03/2019     11/29/2022     10/03/2019       Lab Results   Component Value Date     11/29/2022     04/17/2019    POTASSIUM 4.3 11/29/2022    POTASSIUM 4.4 04/17/2019    CHLORIDE 107 11/29/2022    CHLORIDE 102 10/04/2022    CHLORIDE 104 04/17/2019    CO2 26  11/29/2022    CO2 28 04/17/2019    ANIONGAP 7 11/29/2022    ANIONGAP 11 10/20/2009     (H) 11/29/2022    GLC 88 04/17/2019    BUN 19 11/29/2022    BUN 18 04/17/2019    CR 0.83 11/29/2022    CR 0.87 04/17/2019    GFRESTIMATED 88 11/29/2022    GFRESTIMATED 83 04/17/2019    GFRESTBLACK 96 04/17/2019    GIULIANO 9.2 11/29/2022    GIULIANO 9.2 04/17/2019      Lab Results   Component Value Date    AST 23 04/17/2019    ALT 23 04/17/2019       No results found for: A1C    Lab Results   Component Value Date    INR 1.04 11/29/2022

## 2022-12-07 NOTE — LETTER
12/7/2022    MICHELLE WATKINS  [facility] [address] [facility] [address]    RE: Reynaldo Monteiro       Dear Colleague,     I had the pleasure of seeing Reynaldo Monteiro in the [facility].  Cardiology Clinic Progress Note  Reynaldo Monteiro MRN# [id number]   YOB: 1940 Age: 82 year old     Primary cardiologist: Dr. Meadows     Reason for visit: s/p coronary angiogram     History of presenting illness:    Reynaldo Monteiro is a pleasant 82 year old patient with past medical history significant for type 2 diabetes, hypertension, hyperlipidemia, and severe aortic stenosis.     He has been followed closely by Dr. Meadows in regards to his aortic stenosis.  He has had multiple prior echocardiograms over the years.  Most recent echocardiogram showed progression of his aortic stenosis, now consistent with severe aortic stenosis characterized by valve area of 0.95 cm2, V-max 4.1 m/s, mean gradient of 35 mmHg, and a dimensionless index of 0.23.  His LV function remains preserved with an EF of 60 to 65%.  He underwent TAVR guided CT that showed favorable anatomy for transfemoral approach with an aortic valve calcium score of 1895.  More recently, he underwent coronary angiography on 11/29/2022 that showed nonobstructive coronary disease.    Today, Spencer is here for follow-up after his coronary angiogram.  He reports progressive exertional dyspnea over the last several months.  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  He denies any chest pain, syncope or near syncope, or palpitations.  He has no PND orthopnea.  He has no lower extremity edema.  His right radial site has healed well without any significant pain or bruising.    His blood pressure looks excellent today at 134/75.  EKG shows chronic RBBB.    I reviewed his labs from 11/29/2022 that show normal renal function and electrolytes.  CBC shows hemoglobin 16.7, normal platelet function.         Assessment  and Plan:     ASSESSMENT:    1. Severe aortic stenosis. With progressive exertional dyspnea, being worked-up for TAVR.   2. Hypertension. Well-controlled on diltiazem  mg daily and Toprol XL 25 mg daily.   3. Hyperlipidemia. LDL 65 on rosuvastatin 10 mg daily.   4. Type 2 diabetes. On Jardiance, no A1C on file.   5. Chronic RBBB. Higher risk for PPM post-TAVR.       PLAN:     1. Patient appears stable from a cardiovascular standpoint, I have made no changes today.  2. We will present him at our upcoming interdisciplinary valve conference, then reach out to him regarding final details and timing of TAVR.  3. He will need to be seen by our cardiovascular surgeons to discuss bailout.         Zeenat Fox, LOREN, APRN, CNP  Page: [phone] (8a-5p M-F)    Orders this Visit:  No orders of the defined types were placed in this encounter.    No orders of the defined types were placed in this encounter.    There are no discontinued medications.    Today's clinic visit entailed:  Review of the result(s) of each unique test - coronary angiogram, CT TAVR, EKG, labs, echo  Discussion of management or test interpretation with external physician/other qualified healthcare professional/appropriate source - Dr. eng  30 minutes spent on the date of the encounter doing chart review, review of test results, patient visit and documentation   Provider  Link to [facility] Help Grid     The level of medical decision making during this visit was of moderate complexity.           Review of Systems:     Review of Systems:  Skin:  not assessed     Eyes:  not assessed    ENT:  not assessed    Respiratory:  Negative    Cardiovascular:  Negative    Gastroenterology: not assessed    Genitourinary:  not assessed    Musculoskeletal:  not assessed    Neurologic:  not assessed    Psychiatric:  Negative    Heme/Lymph/Imm:  Negative    Endocrine:  Positive for diabetes            Physical Exam:   Vitals: /75 (BP Location: Left arm, Cuff Size:  Adult Large)   Pulse 61   Wt 81.1 kg (178 lb 12.8 oz)   SpO2 96%   BMI 24.94 kg/m    Constitutional:  cooperative        Skin:  warm and dry to the touch        Head:  normocephalic        Eyes:  pupils equal and round        ENT:  no pallor or cyanosis        Neck:  JVP normal        Chest:  normal breath sounds, clear to auscultation, normal A-P diameter, normal symmetry, normal respiratory excursion, no use of accessory muscles        Cardiac: regular rhythm;normal S1 and S2       grade 2;systolic ejection murmur          Abdomen:  abdomen soft        Vascular: pulses full and equal     2+                                Extremities and Back:  no edema        Neurological:  affect appropriate;no gross motor deficits             Medications:     Current Outpatient Medications   Medication Sig Dispense Refill     aspirin (ASA) 325 MG EC tablet Take 325 mg by mouth daily       B Complex-Folic Acid (SUPER B COMPLEX MAXI PO)        diltiazem ER COATED BEADS (CARDIZEM CD/CARTIA XT) 240 MG 24 hr capsule Take by mouth daily       glucosamine 500 MG CAPS capsule Take 500 mg by mouth daily       JARDIANCE 10 MG TABS tablet 10 mg daily        Metoprolol Succinate 25 MG CS24 Take 25 mg by mouth       rosuvastatin (CRESTOR) 20 MG tablet Take 20 mg by mouth daily 1/2 tablet daily.       VITAMIN D, CHOLECALCIFEROL, PO Take 2,000 Units by mouth daily       vitamin E 400 UNIT capsule Take 400 Units by mouth daily         Family History   Problem Relation Age of Onset     Hypertension Mother         valve replacement      Coronary Artery Disease Father        Social History     Socioeconomic History     Marital status:      Spouse name: Karissa     Number of children: Not on file     Years of education: Not on file     Highest education level: Not on file   Occupational History     Occupation: Small business owner   Tobacco Use     Smoking status: Never     Smokeless tobacco: Never   Substance and Sexual Activity      Alcohol use: Yes     Comment: 1-2 a week      Drug use: No     Sexual activity: Not on file   Other Topics Concern     Parent/sibling w/ CABG, MI or angioplasty before 65F 55M? Not Asked   Social History Narrative     Not on file     Social Determinants of Health     Financial Resource Strain: Not on file   Food Insecurity: Not on file   Transportation Needs: Not on file   Physical Activity: Not on file   Stress: Not on file   Social Connections: Not on file   Intimate Partner Violence: Not on file   Housing Stability: Not on file            Past Medical History:     Past Medical History:   Diagnosis Date     Aortic valve stenosis      Esophagitis, reflux      Hyperlipidemia      Hypertension      Lumbar disc disorder with myelopathy     bilateral leg weakness     RBBB (right bundle branch block)      Severe aortic valve stenosis      Type 2 diabetes mellitus (H)               Past Surgical History:     Past Surgical History:   Procedure Laterality Date     ANGIOGRAM      Patient reports coronary angiogram at [place], [place] approx 10 years ago     CV CORONARY ANGIOGRAM N/A 11/29/2022    Procedure: Coronary Angiogram;  Surgeon: Edwin Meadows MD;  Location:  HEART CARDIAC CATH LAB              Allergies:   Patient has no known allergies.       Data:   All laboratory data reviewed:    Recent Labs   Lab Test 10/04/22  1000 04/17/19  0000 09/04/18  0000   LDL  --  37 40   HDL  --  39 41   NHDL  --  57 57   CHOL  --  96 98   TRIG 132 112 86       Lab Results   Component Value Date    WBC 7.3 11/29/2022    WBC 6.8 10/03/2019    RBC 5.64 11/29/2022    RBC 4.94 10/03/2019    HGB 16.7 11/29/2022    HGB 15.1 10/03/2019    HCT 51.5 11/29/2022    HCT 45.1 10/03/2019    MCV 91 11/29/2022    MCV 91 10/03/2019    MCH 29.6 11/29/2022    MCH 30.6 10/03/2019    MCHC 32.4 11/29/2022    MCHC 33.5 10/03/2019    RDW 13.2 11/29/2022    RDW 12.8 10/03/2019     11/29/2022     10/03/2019       Lab Results   Component  Value Date     11/29/2022     04/17/2019    POTASSIUM 4.3 11/29/2022    POTASSIUM 4.4 04/17/2019    CHLORIDE 107 11/29/2022    CHLORIDE 102 10/04/2022    CHLORIDE 104 04/17/2019    CO2 26 11/29/2022    CO2 28 04/17/2019    ANIONGAP 7 11/29/2022    ANIONGAP 11 10/20/2009     (H) 11/29/2022    GLC 88 04/17/2019    BUN 19 11/29/2022    BUN 18 04/17/2019    CR 0.83 11/29/2022    CR 0.87 04/17/2019    GFRESTIMATED 88 11/29/2022    GFRESTIMATED 83 04/17/2019    GFRESTBLACK 96 04/17/2019    GIULIANO 9.2 11/29/2022    GIULIANO 9.2 04/17/2019      Lab Results   Component Value Date    AST 23 04/17/2019    ALT 23 04/17/2019       No results found for: A1C    Lab Results   Component Value Date    INR 1.04 11/29/2022       Thank you for allowing me to participate in the care of your patient.      Sincerely,     Zeenat Fox, [facility]  cc:   No referring provider defined for this encounter.

## 2022-12-07 NOTE — PATIENT INSTRUCTIONS
Today's Plan:     1) We will reach out to you after we present you at our valve conference on Thursday, 12/8.     Maryse RN ([phone]), Carine RN ([phone]), and Michelle ANDERSON ([phone])    Scheduling phone number: [phone]    Reminder: Please bring in all current medications, over the counter supplements and vitamin bottles to your next appointment.-    It was a pleasure seeing you today!     Zeenat Fox, RAND  12/7/2022    -

## 2022-12-08 ENCOUNTER — CARE COORDINATION (OUTPATIENT)
Dept: CARDIOLOGY | Facility: CLINIC | Age: 82
End: 2022-12-08

## 2022-12-08 DIAGNOSIS — I35.0 AORTIC STENOSIS: Primary | ICD-10-CM

## 2022-12-08 DIAGNOSIS — R06.02 SHORTNESS OF BREATH: ICD-10-CM

## 2022-12-08 NOTE — PROGRESS NOTES
Patient was presented this morning at the multidisciplinary valve conference. Plan is to proceed with TAVR procedure.    Valve: Villanueva  Approach: TF with perclose  [place]: Consider  Sedation: MAC    Above information was called out to the patient.     Will schedule for TAVR procedure 1/17/23, 2nd case.    Remaining items for completion:  CV Surgery visit (telephone) with Dr. Loredo 12/13 to discuss bailout  H&P with Zeenat 1/4/23 with labs at [facility]    Carine Li RN  Structural Heart Coordinator  [facility]

## 2023-01-03 LAB
ABO/RH(D): NORMAL
ANTIBODY SCREEN: NEGATIVE
SPECIMEN EXPIRATION DATE: NORMAL

## 2023-01-04 ENCOUNTER — LAB (OUTPATIENT)
Dept: LAB | Facility: CLINIC | Age: 83
End: 2023-01-04
Payer: MEDICARE

## 2023-01-04 ENCOUNTER — OFFICE VISIT (OUTPATIENT)
Dept: CARDIOLOGY | Facility: CLINIC | Age: 83
End: 2023-01-04
Payer: MEDICARE

## 2023-01-04 VITALS
HEIGHT: 70 IN | SYSTOLIC BLOOD PRESSURE: 138 MMHG | OXYGEN SATURATION: 96 % | WEIGHT: 198.3 LBS | BODY MASS INDEX: 28.39 KG/M2 | HEART RATE: 76 BPM | DIASTOLIC BLOOD PRESSURE: 74 MMHG

## 2023-01-04 DIAGNOSIS — I35.0 AORTIC STENOSIS: ICD-10-CM

## 2023-01-04 DIAGNOSIS — I45.10 RBBB (RIGHT BUNDLE BRANCH BLOCK): ICD-10-CM

## 2023-01-04 DIAGNOSIS — R06.02 SHORTNESS OF BREATH: ICD-10-CM

## 2023-01-04 DIAGNOSIS — I35.0 SEVERE AORTIC STENOSIS BY PRIOR ECHOCARDIOGRAM: Primary | ICD-10-CM

## 2023-01-04 LAB
ALBUMIN SERPL-MCNC: 4.2 G/DL (ref 3.4–5)
ALP SERPL-CCNC: 98 U/L (ref 40–150)
ALT SERPL W P-5'-P-CCNC: 34 U/L (ref 0–70)
ANION GAP SERPL CALCULATED.3IONS-SCNC: 8 MMOL/L (ref 3–14)
AST SERPL W P-5'-P-CCNC: 23 U/L (ref 0–45)
BILIRUB SERPL-MCNC: 1.2 MG/DL (ref 0.2–1.3)
BLOOD BANK CHART COMMENT: NORMAL
BUN SERPL-MCNC: 18 MG/DL (ref 7–30)
CALCIUM SERPL-MCNC: 9.2 MG/DL (ref 8.5–10.1)
CHLORIDE BLD-SCNC: 106 MMOL/L (ref 94–109)
CO2 SERPL-SCNC: 25 MMOL/L (ref 20–32)
CREAT SERPL-MCNC: 0.74 MG/DL (ref 0.66–1.25)
ERYTHROCYTE [DISTWIDTH] IN BLOOD BY AUTOMATED COUNT: 12.9 % (ref 10–15)
GFR SERPL CREATININE-BSD FRML MDRD: >90 ML/MIN/1.73M2
GLUCOSE BLD-MCNC: 142 MG/DL (ref 70–99)
HCT VFR BLD AUTO: 48.2 % (ref 40–53)
HGB BLD-MCNC: 15.9 G/DL (ref 13.3–17.7)
INR PPP: 1.08 (ref 0.85–1.15)
MCH RBC QN AUTO: 29.7 PG (ref 26.5–33)
MCHC RBC AUTO-ENTMCNC: 33 G/DL (ref 31.5–36.5)
MCV RBC AUTO: 90 FL (ref 78–100)
NT-PROBNP SERPL-MCNC: 81 PG/ML (ref 0–1800)
PLATELET # BLD AUTO: 159 10E3/UL (ref 150–450)
POTASSIUM BLD-SCNC: 4.4 MMOL/L (ref 3.4–5.3)
PROT SERPL-MCNC: 7.9 G/DL (ref 6.8–8.8)
RBC # BLD AUTO: 5.36 10E6/UL (ref 4.4–5.9)
SODIUM SERPL-SCNC: 139 MMOL/L (ref 133–144)
SPECIMEN EXPIRATION DATE: NORMAL
WBC # BLD AUTO: 7.3 10E3/UL (ref 4–11)

## 2023-01-04 PROCEDURE — 85027 COMPLETE CBC AUTOMATED: CPT

## 2023-01-04 PROCEDURE — 86901 BLOOD TYPING SEROLOGIC RH(D): CPT

## 2023-01-04 PROCEDURE — 93000 ELECTROCARDIOGRAM COMPLETE: CPT | Performed by: NURSE PRACTITIONER

## 2023-01-04 PROCEDURE — 36415 COLL VENOUS BLD VENIPUNCTURE: CPT

## 2023-01-04 PROCEDURE — 80053 COMPREHEN METABOLIC PANEL: CPT

## 2023-01-04 PROCEDURE — 85610 PROTHROMBIN TIME: CPT

## 2023-01-04 PROCEDURE — 99215 OFFICE O/P EST HI 40 MIN: CPT | Performed by: NURSE PRACTITIONER

## 2023-01-04 PROCEDURE — 83880 ASSAY OF NATRIURETIC PEPTIDE: CPT

## 2023-01-04 NOTE — LETTER
1/4/2023    MICHELLE WATKINS  [facility] [address]  [facility] [address]    RE: Reynaldo Cavazos Thania       Dear Colleague,     I had the pleasure of seeing Reynaldo Monteiro in the [facility].      STRUCTURAL HEART CLINIC  H&P    Referring Provider: Dr. Meadows   Primary Cardiologist: Dr. Villatoro     History of Present Illness  Reynaldo Monteiro is a very pleasant 82-year-old gentleman with a past medical history significant for type 2 diabetes, hypertension, hyperlipidemia, chronic diastolic heart failure, chronic RBBB, and severe aortic stenosis, who presents today for pre-operative H&P in preparation for transcatheter aortic valve replacement on 1/17/2023 at [facility] with Dr. Meadows.     He has been followed closely by Dr. Villatoro in regards to his aortic stenosis.  He has had multiple prior echocardiograms over the years.  Most recent echocardiogram showed progression of his aortic stenosis, now consistent with severe aortic stenosis characterized by valve area of 0.95 cm2, V-max 4.1 m/s, mean gradient of 35 mmHg, and a dimensionless index of 0.23.  His LV function remains preserved with an EF of 60 to 65%.      He was referred to our structural heart clinic and was seen by Dr. Meadows, who deemed him an appropriate TAVR candidate.  He underwent TAVR guided CT that showed favorable anatomy for transfemoral approach with an aortic valve calcium score of 1895.  More recently, he underwent coronary angiography on 11/29/2022 that showed nonobstructive coronary disease.  He has been counseled for the above procedure.    Today Spencer endorses ongoing dyspnea with exertion, although he remains quite active without any significant limitations.  He denies any chest discomfort, syncope or near syncope, or palpitations.  He has no PND orthopnea.  He has no infectious symptoms today.    EKG demonstrates sinus rhythm with chronic right bundle branch block.  I reviewed his BMP that shows normal renal function  and electrolytes.  His CBC is also normal.    Assessment and Plan     Reynaldo Monteiro presents for pre-operative H&P in preparation for a transcatheter aortic valve replacement on 1/17/2023 at [facility].       1. Severe aortic valve stenosis: Patient reports progressive exertional dyspnea over the last few months. His echo shows severe aortic stenosis. He has been evaluated by the our structural heart team and has been deemed an appropriate candidate for transcatheter aortic valve replacement. We discussed the procedure in detail, including pre and post-procedure care, restrictions and follow-up. He understands risks including 5-10% risk of heart block leading to permanent pacemaker placement, 3% risk of bleeding, infection, vascular complication, 1-3% risk of stroke and very low risk (<1%) of serious complications such as cardiac perforation, aortic root rupture, dissection or death.     All questions answered  Type and screen orders complete  Supplies for scrubbing provided  No known contrast dye allergy  No trouble with anesthesia in the past  Labs today WNL, no s/s of infection    2. Chronic diastolic heart failure, NYHA class II, Stage B: Secondary to valvular heart disease. No acute volume overload on exam, though patient does endorse significant exertional dyspnea. NT-BNP nml. Not currently on diuretic therapy, recommended treatment is TAVR.    3. CAD: Pre-TAVR coronary angiogram showed nonobstructive coronary disease.     4.  Hyperlipidemia: LDL 65 on rosuvastatin 10 mg daily.    5. Hypertension: Currently on diltiazem  mg daily and Toprol-XL 25 mg daily.     6. Type II DM: On Jardiance. Hold x 3 days prior to procedure.     Medication Recommendations:  Antiplatelet: Continue  mg perioperatively   BB: Continue perioperatively without interruption  Antidiabetics: Hold 3 days prior, last dose 1/13/2022  Supplements: Hold morning of procedure    Patient is optimized and is  acceptable candidate for the proposed procedure.  No further diagnostic evaluation is needed. Pre-procedure instructions provided in written format.     Zeenat Fox, DNP, APRN, CNP  Structural Heart Nurse Practitioner  [facility]   Pager: [phone]    Current Medications:  Current Outpatient Medications   Medication Sig Dispense Refill     aspirin (ASA) 325 MG EC tablet Take 325 mg by mouth daily       B Complex-Folic Acid (SUPER B COMPLEX MAXI PO)        diltiazem ER COATED BEADS (CARDIZEM CD/CARTIA XT) 240 MG 24 hr capsule Take by mouth daily       glucosamine 500 MG CAPS capsule Take 500 mg by mouth daily       JARDIANCE 10 MG TABS tablet 10 mg daily        Metoprolol Succinate 25 MG CS24 Take 25 mg by mouth       rosuvastatin (CRESTOR) 20 MG tablet Take 20 mg by mouth daily 1/2 tablet daily.       VITAMIN D, CHOLECALCIFEROL, PO Take 2,000 Units by mouth daily       vitamin E 400 UNIT capsule Take 400 Units by mouth daily         Allergies:   No Known Allergies    Past Medical History:  Past Medical History:   Diagnosis Date     Aortic valve stenosis      Esophagitis, reflux      Hyperlipidemia      Hypertension      Lumbar disc disorder with myelopathy     bilateral leg weakness     RBBB (right bundle branch block)      Severe aortic valve stenosis      Type 2 diabetes mellitus (H)        Past Surgical History:  Past Surgical History:   Procedure Laterality Date     ANGIOGRAM      Patient reports coronary angiogram at [place], [place] approx 10 years ago     CV CORONARY ANGIOGRAM N/A 11/29/2022    Procedure: Coronary Angiogram;  Surgeon: Edwin Meadows MD;  Location:  HEART CARDIAC CATH LAB       Family History:  Family History   Problem Relation Age of Onset     Hypertension Mother         valve replacement      Coronary Artery Disease Father        Social History:  Social History     Socioeconomic History     Marital status:      Spouse name: Karissa   Occupational History      "Occupation: Small business owner   Tobacco Use     Smoking status: Never     Smokeless tobacco: Never   Substance and Sexual Activity     Alcohol use: Yes     Comment: 1-2 a week      Drug use: No       Review of Systems:  Constitutional: No fever, chills, or sweats. No weight gain/loss   ENT: No visual disturbance, ear ache, epistaxis, sore throat  Allergies/Immunologic: Negative.   Respiratory: No cough, hemoptysia  Cardiovascular: As per HPI  GI: No nausea, vomiting, hematemesis, melena, or hematochezia  : No urinary frequency, dysuria, or hematuria  Integument: Negative  Psychiatric: Negative  Neuro: Negative  Endocrinology: Negative   Musculoskeletal: Negative      Physical Exam:  Vitals: /74   Pulse 76   Ht 1.778 m (5' 10\")   Wt 89.9 kg (198 lb 4.8 oz)   SpO2 96%   BMI 28.45 kg/m     General: NAD  HEENT:  Dentition intact.    Neck: No jugular venous distension.   Heart: RRR with grade II LORE at RUSB  Lungs: CTA.    Abdomen: Soft, nontender, nondistended.   Extremities: No clubbing, cyanosis, or edema.  The pulses are +4/4 at the radial, brachial, femoral, popliteal, DP, and PT sites bilaterally.  No bruits are noted.  Neurologic: Alert and oriented to person/place/time, normal speech, gait and affect  Skin: No petechiae, purpura or rash.      Diagnostic Studies:  ECG: sinus rhythm with underlying RBBB  Personally reviewed and interpreted by me.    Coronary Angiogram: 11/29/2022  Conclusion    Non obstructive coronary artery disease   Hemostasis of RRA with TR band       Plan      Follow bedrest per protocol    Continued medical management and lifestyle modifications for cardiovascular risk factor optimizations.    Arterial sheath removed from radial artery with TR band placement.    Discharge today per protocol      Post antiplatelet therapy of    Aspirin; give 81 mg qd .      Continue high dose statin therapy  Aggressive risk factor modification for CAD         Echocardiogram: " 10/12/2022  Interpretation Summary     The visual ejection fraction is 60-65%.  The right ventricle is normal in size and function.  Severe AS is noted. Peak velocity is 4.1 m/sec. ZAKI is 1 cm2.  The inferior vena cava was normal in size with preserved respiratory  variability.  There is no pericardial effusion.      Laboratory Studies:  Personally reviewed and interpreted by me.    LIPID RESULTS:  Lab Results   Component Value Date    CHOL 96 04/17/2019    HDL 39 04/17/2019    LDL 37 04/17/2019    TRIG 132 10/04/2022    TRIG 112 04/17/2019       LIVER ENZYME RESULTS:  Lab Results   Component Value Date    AST 23 01/04/2023    AST 23 04/17/2019    ALT 34 01/04/2023    ALT 23 04/17/2019       CBC RESULTS:  Lab Results   Component Value Date    WBC 7.3 01/04/2023    WBC 6.8 10/03/2019    RBC 5.36 01/04/2023    RBC 4.94 10/03/2019    HGB 15.9 01/04/2023    HGB 15.1 10/03/2019    HCT 48.2 01/04/2023    HCT 45.1 10/03/2019    MCV 90 01/04/2023    MCV 91 10/03/2019    MCH 29.7 01/04/2023    MCH 30.6 10/03/2019    MCHC 33.0 01/04/2023    MCHC 33.5 10/03/2019    RDW 12.9 01/04/2023    RDW 12.8 10/03/2019     01/04/2023     10/03/2019       BMP RESULTS:  Lab Results   Component Value Date     01/04/2023     04/17/2019    POTASSIUM 4.4 01/04/2023    POTASSIUM 4.4 04/17/2019    CHLORIDE 106 01/04/2023    CHLORIDE 102 10/04/2022    CHLORIDE 104 04/17/2019    CO2 25 01/04/2023    CO2 28 04/17/2019    ANIONGAP 8 01/04/2023    ANIONGAP 11 10/20/2009     (H) 01/04/2023    GLC 88 04/17/2019    BUN 18 01/04/2023    BUN 18 04/17/2019    CR 0.74 01/04/2023    CR 0.87 04/17/2019    GFRESTIMATED >90 01/04/2023    GFRESTIMATED 83 04/17/2019    GFRESTBLACK 96 04/17/2019    GIULIANO 9.2 01/04/2023    GIULIANO 9.2 04/17/2019        A1C RESULTS:  No results found for: A1C    INR RESULTS:  Lab Results   Component Value Date    INR 1.08 01/04/2023    INR 1.04 11/29/2022       Thank you for allowing me to participate in  the care of your patient.      Sincerely,     Zeenat Fox [facility]  cc:   No referring provider defined for this encounter.

## 2023-01-04 NOTE — PATIENT INSTRUCTIONS
TAVR PRE-PROCEDURE INSTRUCTIONS:    - Your TAVR is scheduled Tuesday 1/17, 2nd case. Please check-in at 8:00 AM at the Registration Desk in the [facility] at [facility].    - Use the Hibiclens soap I have provided you the night before and morning of the procedure. Wash from chin to toes, please avoid your face and eyes.    - Nothing to eat or drink the morning of your TAVR.     Medication instructions:  - You make take your morning medications with sips of water.   - Please hold all vitamins and supplements the morning of your procedure.  - You will need to hold your Jardiance prior to TAVR, take your last dose on: 1/13.   - You may continue taking 325 mg aspirin daily.     - Patient and visitor must wear face masks. Two visitors may see you when you get to your room in the Heart Center (2nd floor of [facility]).    - You will stay overnight on Tuesday night. We will monitor you overnight for signs of bleeding, stroke, as well as need for pacemaker. On Wednesday morning, you will have an echo of your new valve and work with cardiac rehab.     It was nice to see you today! Please call with any other questions, concerns, or any changes in your health: [phone]    Carine Li RN  Structural Heart Coordinator  [facility]

## 2023-01-04 NOTE — PROGRESS NOTES
STRUCTURAL HEART CLINIC  H&P    Referring Provider: Dr. Meadows   Primary Cardiologist: Dr. Villatoro     History of Present Illness  Reynaldo Monteiro is a very pleasant 82-year-old gentleman with a past medical history significant for type 2 diabetes, hypertension, hyperlipidemia, chronic diastolic heart failure, chronic RBBB, and severe aortic stenosis, who presents today for pre-operative H&P in preparation for transcatheter aortic valve replacement on 1/17/2023 at [facility] with Dr. Meadows.     He has been followed closely by Dr. Villatoro in regards to his aortic stenosis.  He has had multiple prior echocardiograms over the years.  Most recent echocardiogram showed progression of his aortic stenosis, now consistent with severe aortic stenosis characterized by valve area of 0.95 cm2, V-max 4.1 m/s, mean gradient of 35 mmHg, and a dimensionless index of 0.23.  His LV function remains preserved with an EF of 60 to 65%.      He was referred to our structural heart clinic and was seen by Dr. Meadows, who deemed him an appropriate TAVR candidate.  He underwent TAVR guided CT that showed favorable anatomy for transfemoral approach with an aortic valve calcium score of 1895.  More recently, he underwent coronary angiography on 11/29/2022 that showed nonobstructive coronary disease.  He has been counseled for the above procedure.    Today Spencer endorses ongoing dyspnea with exertion, although he remains quite active without any significant limitations.  He denies any chest discomfort, syncope or near syncope, or palpitations.  He has no PND orthopnea.  He has no infectious symptoms today.    EKG demonstrates sinus rhythm with chronic right bundle branch block.  I reviewed his BMP that shows normal renal function and electrolytes.  His CBC is also normal.    Assessment and Plan     Reynaldo Monteiro presents for pre-operative H&P in preparation for a transcatheter aortic valve replacement on 1/17/2023 at [facility]  [facility].       1. Severe aortic valve stenosis: Patient reports progressive exertional dyspnea over the last few months. His echo shows severe aortic stenosis. He has been evaluated by the our structural heart team and has been deemed an appropriate candidate for transcatheter aortic valve replacement. We discussed the procedure in detail, including pre and post-procedure care, restrictions and follow-up. He understands risks including 5-10% risk of heart block leading to permanent pacemaker placement, 3% risk of bleeding, infection, vascular complication, 1-3% risk of stroke and very low risk (<1%) of serious complications such as cardiac perforation, aortic root rupture, dissection or death.     All questions answered  Type and screen orders complete  Supplies for scrubbing provided  No known contrast dye allergy  No trouble with anesthesia in the past  Labs today WNL, no s/s of infection    2. Chronic diastolic heart failure, NYHA class II, Stage B: Secondary to valvular heart disease. No acute volume overload on exam, though patient does endorse significant exertional dyspnea. NT-BNP nml. Not currently on diuretic therapy, recommended treatment is TAVR.    3. CAD: Pre-TAVR coronary angiogram showed nonobstructive coronary disease.     4.  Hyperlipidemia: LDL 65 on rosuvastatin 10 mg daily.    5. Hypertension: Currently on diltiazem  mg daily and Toprol-XL 25 mg daily.     6. Type II DM: On Jardiance. Hold x 3 days prior to procedure.     Medication Recommendations:  Antiplatelet: Continue  mg perioperatively   BB: Continue perioperatively without interruption  Antidiabetics: Hold 3 days prior, last dose 1/13/2022  Supplements: Hold morning of procedure    Patient is optimized and is acceptable candidate for the proposed procedure.  No further diagnostic evaluation is needed. Pre-procedure instructions provided in written format.     Zeenat Fox, DNP, APRN, CNP  Structural Heart Nurse  Practitioner  [facility]   Pager: [phone]    Current Medications:  Current Outpatient Medications   Medication Sig Dispense Refill     aspirin (ASA) 325 MG EC tablet Take 325 mg by mouth daily       B Complex-Folic Acid (SUPER B COMPLEX MAXI PO)        diltiazem ER COATED BEADS (CARDIZEM CD/CARTIA XT) 240 MG 24 hr capsule Take by mouth daily       glucosamine 500 MG CAPS capsule Take 500 mg by mouth daily       JARDIANCE 10 MG TABS tablet 10 mg daily        Metoprolol Succinate 25 MG CS24 Take 25 mg by mouth       rosuvastatin (CRESTOR) 20 MG tablet Take 20 mg by mouth daily 1/2 tablet daily.       VITAMIN D, CHOLECALCIFEROL, PO Take 2,000 Units by mouth daily       vitamin E 400 UNIT capsule Take 400 Units by mouth daily         Allergies:   No Known Allergies    Past Medical History:  Past Medical History:   Diagnosis Date     Aortic valve stenosis      Esophagitis, reflux      Hyperlipidemia      Hypertension      Lumbar disc disorder with myelopathy     bilateral leg weakness     RBBB (right bundle branch block)      Severe aortic valve stenosis      Type 2 diabetes mellitus (H)        Past Surgical History:  Past Surgical History:   Procedure Laterality Date     ANGIOGRAM      Patient reports coronary angiogram at [place], [place] approx 10 years ago     CV CORONARY ANGIOGRAM N/A 11/29/2022    Procedure: Coronary Angiogram;  Surgeon: Edwin Meadows MD;  Location:  HEART CARDIAC CATH LAB       Family History:  Family History   Problem Relation Age of Onset     Hypertension Mother         valve replacement      Coronary Artery Disease Father        Social History:  Social History     Socioeconomic History     Marital status:      Spouse name: Karissa   Occupational History     Occupation: Small business owner   Tobacco Use     Smoking status: Never     Smokeless tobacco: Never   Substance and Sexual Activity     Alcohol use: Yes     Comment: 1-2 a week      Drug use: No       Review  "of Systems:  Constitutional: No fever, chills, or sweats. No weight gain/loss   ENT: No visual disturbance, ear ache, epistaxis, sore throat  Allergies/Immunologic: Negative.   Respiratory: No cough, hemoptysia  Cardiovascular: As per HPI  GI: No nausea, vomiting, hematemesis, melena, or hematochezia  : No urinary frequency, dysuria, or hematuria  Integument: Negative  Psychiatric: Negative  Neuro: Negative  Endocrinology: Negative   Musculoskeletal: Negative      Physical Exam:  Vitals: /74   Pulse 76   Ht 1.778 m (5' 10\")   Wt 89.9 kg (198 lb 4.8 oz)   SpO2 96%   BMI 28.45 kg/m     General: NAD  HEENT:  Dentition intact.    Neck: No jugular venous distension.   Heart: RRR with grade II LORE at RUSB  Lungs: CTA.    Abdomen: Soft, nontender, nondistended.   Extremities: No clubbing, cyanosis, or edema.  The pulses are +4/4 at the radial, brachial, femoral, popliteal, DP, and PT sites bilaterally.  No bruits are noted.  Neurologic: Alert and oriented to person/place/time, normal speech, gait and affect  Skin: No petechiae, purpura or rash.      Diagnostic Studies:  ECG: sinus rhythm with underlying RBBB  Personally reviewed and interpreted by me.    Coronary Angiogram: 11/29/2022  Conclusion    Non obstructive coronary artery disease   Hemostasis of RRA with TR band       Plan      Follow bedrest per protocol    Continued medical management and lifestyle modifications for cardiovascular risk factor optimizations.    Arterial sheath removed from radial artery with TR band placement.    Discharge today per protocol      Post antiplatelet therapy of    Aspirin; give 81 mg qd .      Continue high dose statin therapy  Aggressive risk factor modification for CAD         Echocardiogram: 10/12/2022  Interpretation Summary     The visual ejection fraction is 60-65%.  The right ventricle is normal in size and function.  Severe AS is noted. Peak velocity is 4.1 m/sec. ZAKI is 1 cm2.  The inferior vena cava was normal " in size with preserved respiratory  variability.  There is no pericardial effusion.      Laboratory Studies:  Personally reviewed and interpreted by me.    LIPID RESULTS:  Lab Results   Component Value Date    CHOL 96 04/17/2019    HDL 39 04/17/2019    LDL 37 04/17/2019    TRIG 132 10/04/2022    TRIG 112 04/17/2019       LIVER ENZYME RESULTS:  Lab Results   Component Value Date    AST 23 01/04/2023    AST 23 04/17/2019    ALT 34 01/04/2023    ALT 23 04/17/2019       CBC RESULTS:  Lab Results   Component Value Date    WBC 7.3 01/04/2023    WBC 6.8 10/03/2019    RBC 5.36 01/04/2023    RBC 4.94 10/03/2019    HGB 15.9 01/04/2023    HGB 15.1 10/03/2019    HCT 48.2 01/04/2023    HCT 45.1 10/03/2019    MCV 90 01/04/2023    MCV 91 10/03/2019    MCH 29.7 01/04/2023    MCH 30.6 10/03/2019    MCHC 33.0 01/04/2023    MCHC 33.5 10/03/2019    RDW 12.9 01/04/2023    RDW 12.8 10/03/2019     01/04/2023     10/03/2019       BMP RESULTS:  Lab Results   Component Value Date     01/04/2023     04/17/2019    POTASSIUM 4.4 01/04/2023    POTASSIUM 4.4 04/17/2019    CHLORIDE 106 01/04/2023    CHLORIDE 102 10/04/2022    CHLORIDE 104 04/17/2019    CO2 25 01/04/2023    CO2 28 04/17/2019    ANIONGAP 8 01/04/2023    ANIONGAP 11 10/20/2009     (H) 01/04/2023    GLC 88 04/17/2019    BUN 18 01/04/2023    BUN 18 04/17/2019    CR 0.74 01/04/2023    CR 0.87 04/17/2019    GFRESTIMATED >90 01/04/2023    GFRESTIMATED 83 04/17/2019    GFRESTBLACK 96 04/17/2019    GIULIANO 9.2 01/04/2023    GIULIANO 9.2 04/17/2019        A1C RESULTS:  No results found for: A1C    INR RESULTS:  Lab Results   Component Value Date    INR 1.08 01/04/2023    INR 1.04 11/29/2022

## 2023-01-16 ENCOUNTER — CARE COORDINATION (OUTPATIENT)
Dept: CARDIOLOGY | Facility: CLINIC | Age: 83
End: 2023-01-16

## 2023-01-16 DIAGNOSIS — I35.0 NONRHEUMATIC AORTIC VALVE STENOSIS: Primary | ICD-10-CM

## 2023-01-16 RX ORDER — MULTIVITAMIN WITH IRON
1 TABLET ORAL DAILY
COMMUNITY

## 2023-01-16 RX ORDER — POLYETHYLENE GLYCOL 3350 17 G/17G
1 POWDER, FOR SOLUTION ORAL DAILY PRN
COMMUNITY

## 2023-01-16 RX ORDER — METOPROLOL SUCCINATE 25 MG/1
25 TABLET, EXTENDED RELEASE ORAL DAILY
Status: ON HOLD | COMMUNITY
End: 2023-02-08

## 2023-01-16 RX ORDER — ASPIRIN 325 MG
325 TABLET ORAL ONCE
Status: CANCELLED | OUTPATIENT
Start: 2023-01-16 | End: 2023-01-16

## 2023-01-16 RX ORDER — DOXAZOSIN 4 MG/1
2 TABLET ORAL AT BEDTIME
COMMUNITY

## 2023-01-16 RX ORDER — CEFAZOLIN SODIUM 2 G/100ML
2 INJECTION, SOLUTION INTRAVENOUS
Status: CANCELLED | OUTPATIENT
Start: 2023-01-16

## 2023-01-16 RX ORDER — CHOLECALCIFEROL (VITAMIN D3) 50 MCG
1 TABLET ORAL DAILY
COMMUNITY

## 2023-01-16 RX ORDER — SODIUM CHLORIDE 9 MG/ML
INJECTION, SOLUTION INTRAVENOUS CONTINUOUS
Status: CANCELLED | OUTPATIENT
Start: 2023-01-16

## 2023-01-16 RX ORDER — LIDOCAINE 40 MG/G
CREAM TOPICAL
Status: CANCELLED | OUTPATIENT
Start: 2023-01-16

## 2023-01-16 NOTE — PROGRESS NOTES
S-(situation): Pt c/o cold    B-(background): TAVR scheduled for tomorrow, pt would prefer not to r/s    A-(assessment): Pt sounds hoarse on the phone. Only sx is sore throat. COVID-19 home test negative yesterday.     R-(recommendations): Reviewed with Zeenat Fox NP. Recommend additional COVID-19 home test today and take picture of result to bring to care suites. If negative, proceed with TAVR. Pt's wife Karissa updated -- she verbalized understanding.     Carine Li, BSN, RN, PHN, CHFN, HNB-BC   1/16/2023 at 11:50 AM

## 2023-01-16 NOTE — PROGRESS NOTES
PTA medications updated by Medication Scribe prior to surgery via phone call with patient (last doses completed by Nurse)     Medication history sources: Patient, Surescripts and H&P  In the past week, patient estimated taking medication this percent of the time: Greater than 90%  Adherence assessment: N/A Not Observed    Significant changes made to the medication list:  None      Additional medication history information:   None    Medication reconciliation completed by provider prior to medication history? No    Time spent in this activity: 30 MINUTES    The information provided in this note is only as accurate as the sources available at the time of update(s)      Prior to Admission medications    Medication Sig Last Dose Taking? Auth Provider Long Term End Date   aspirin (ASA) 325 MG EC tablet Take 325 mg by mouth daily  at AM Yes Reported, Patient     diltiazem ER COATED BEADS (CARDIZEM CD/CARTIA XT) 240 MG 24 hr capsule Take 240 mg by mouth daily  at AM Yes Reported, Patient Yes    doxazosin (CARDURA) 4 MG tablet Take 2 mg by mouth At Bedtime (4MG X 0.5 = 2MG)  at PM Yes Reported, Patient No    empagliflozin (JARDIANCE) 10 MG TABS tablet Take 10 mg by mouth daily  at AM Yes Reported, Patient     glucosamine 500 MG CAPS capsule Take 500 mg by mouth 2 times daily  at PM Yes Reported, Patient     metoprolol succinate ER (TOPROL XL) 25 MG 24 hr tablet Take 25 mg by mouth daily  at AM Yes Reported, Patient No    polyethylene glycol (MIRALAX) 17 GM/Dose powder Take 1 capful by mouth daily as needed for constipation  at AM Yes Reported, Patient     rosuvastatin (CRESTOR) 20 MG tablet Take 10 mg by mouth daily (20MG X 0.5 = 10MG)  at PM Yes Reported, Patient No    vitamin C with B complex (B COMPLEX-C) tablet Take 1 tablet by mouth daily  at AM Yes Reported, Patient     vitamin D3 (CHOLECALCIFEROL) 50 mcg (2000 units) tablet Take 1 tablet by mouth daily  at AM Yes Reported, Patient     vitamin E 400 UNIT capsule Take  400 Units by mouth daily  at AM Yes Reported, Patient

## 2023-01-17 ENCOUNTER — HOSPITAL ENCOUNTER (OUTPATIENT)
Facility: CLINIC | Age: 83
Discharge: HOME | End: 2023-01-17
Attending: INTERNAL MEDICINE | Admitting: INTERNAL MEDICINE
Payer: MEDICARE

## 2023-01-17 ENCOUNTER — HOSPITAL ENCOUNTER (OUTPATIENT)
Dept: CARDIOLOGY | Facility: CLINIC | Age: 83
Discharge: HOME | End: 2023-01-17
Attending: INTERNAL MEDICINE
Payer: MEDICARE

## 2023-01-17 VITALS
HEART RATE: 65 BPM | DIASTOLIC BLOOD PRESSURE: 84 MMHG | TEMPERATURE: 97.6 F | OXYGEN SATURATION: 96 % | WEIGHT: 193.2 LBS | BODY MASS INDEX: 27.05 KG/M2 | HEIGHT: 71 IN | SYSTOLIC BLOOD PRESSURE: 169 MMHG | RESPIRATION RATE: 11 BRPM

## 2023-01-17 DIAGNOSIS — I35.0 AORTIC STENOSIS: ICD-10-CM

## 2023-01-17 ASSESSMENT — ACTIVITIES OF DAILY LIVING (ADL): ADLS_ACUITY_SCORE: 33

## 2023-01-20 DIAGNOSIS — R06.02 SHORTNESS OF BREATH: ICD-10-CM

## 2023-01-20 DIAGNOSIS — I35.0 SEVERE AORTIC STENOSIS BY PRIOR ECHOCARDIOGRAM: Primary | ICD-10-CM

## 2023-01-30 LAB
ABO/RH(D): NORMAL
ANTIBODY SCREEN: NEGATIVE
SPECIMEN EXPIRATION DATE: NORMAL

## 2023-01-31 ENCOUNTER — LAB (OUTPATIENT)
Dept: LAB | Facility: CLINIC | Age: 83
End: 2023-01-31
Payer: MEDICARE

## 2023-01-31 DIAGNOSIS — R06.02 SHORTNESS OF BREATH: ICD-10-CM

## 2023-01-31 DIAGNOSIS — I35.0 SEVERE AORTIC STENOSIS BY PRIOR ECHOCARDIOGRAM: ICD-10-CM

## 2023-01-31 LAB
ANION GAP SERPL CALCULATED.3IONS-SCNC: 11 MMOL/L (ref 7–15)
BUN SERPL-MCNC: 17.7 MG/DL (ref 8–23)
CALCIUM SERPL-MCNC: 9.3 MG/DL (ref 8.8–10.2)
CHLORIDE SERPL-SCNC: 102 MMOL/L (ref 98–107)
CREAT SERPL-MCNC: 0.78 MG/DL (ref 0.67–1.17)
DEPRECATED HCO3 PLAS-SCNC: 27 MMOL/L (ref 22–29)
ERYTHROCYTE [DISTWIDTH] IN BLOOD BY AUTOMATED COUNT: 13.2 % (ref 10–15)
GFR SERPL CREATININE-BSD FRML MDRD: 90 ML/MIN/1.73M2
GLUCOSE SERPL-MCNC: 167 MG/DL (ref 70–99)
HCT VFR BLD AUTO: 47.3 % (ref 40–53)
HGB BLD-MCNC: 15.6 G/DL (ref 13.3–17.7)
INR PPP: 0.98 (ref 0.85–1.15)
MCH RBC QN AUTO: 29.7 PG (ref 26.5–33)
MCHC RBC AUTO-ENTMCNC: 33 G/DL (ref 31.5–36.5)
MCV RBC AUTO: 90 FL (ref 78–100)
NT-PROBNP SERPL-MCNC: 145 PG/ML (ref 0–1800)
PLATELET # BLD AUTO: 150 10E3/UL (ref 150–450)
POTASSIUM SERPL-SCNC: 4.1 MMOL/L (ref 3.4–5.3)
RBC # BLD AUTO: 5.26 10E6/UL (ref 4.4–5.9)
SODIUM SERPL-SCNC: 140 MMOL/L (ref 136–145)
WBC # BLD AUTO: 7.2 10E3/UL (ref 4–11)

## 2023-01-31 PROCEDURE — 83880 ASSAY OF NATRIURETIC PEPTIDE: CPT

## 2023-01-31 PROCEDURE — 86901 BLOOD TYPING SEROLOGIC RH(D): CPT

## 2023-01-31 PROCEDURE — 85027 COMPLETE CBC AUTOMATED: CPT

## 2023-01-31 PROCEDURE — 36415 COLL VENOUS BLD VENIPUNCTURE: CPT

## 2023-01-31 PROCEDURE — 85610 PROTHROMBIN TIME: CPT

## 2023-01-31 PROCEDURE — 80048 BASIC METABOLIC PNL TOTAL CA: CPT

## 2023-02-06 ENCOUNTER — CARE COORDINATION (OUTPATIENT)
Dept: CARDIOLOGY | Facility: CLINIC | Age: 83
End: 2023-02-06
Payer: MEDICARE

## 2023-02-06 DIAGNOSIS — I35.0 NONRHEUMATIC AORTIC VALVE STENOSIS: Primary | ICD-10-CM

## 2023-02-06 RX ORDER — ASPIRIN 325 MG
325 TABLET ORAL ONCE
Status: CANCELLED | OUTPATIENT
Start: 2023-02-06 | End: 2023-02-06

## 2023-02-06 RX ORDER — CEFAZOLIN SODIUM 2 G/100ML
2 INJECTION, SOLUTION INTRAVENOUS
Status: CANCELLED | OUTPATIENT
Start: 2023-02-06

## 2023-02-06 RX ORDER — SODIUM CHLORIDE 9 MG/ML
INJECTION, SOLUTION INTRAVENOUS CONTINUOUS
Status: CANCELLED | OUTPATIENT
Start: 2023-02-06

## 2023-02-06 RX ORDER — LIDOCAINE 40 MG/G
CREAM TOPICAL
Status: CANCELLED | OUTPATIENT
Start: 2023-02-06

## 2023-02-07 ENCOUNTER — HOSPITAL ENCOUNTER (OUTPATIENT)
Dept: CARDIOLOGY | Facility: CLINIC | Age: 83
Discharge: HOME | DRG: 267 | End: 2023-02-07
Attending: INTERNAL MEDICINE | Admitting: INTERNAL MEDICINE
Payer: MEDICARE

## 2023-02-07 ENCOUNTER — HOSPITAL ENCOUNTER (INPATIENT)
Facility: CLINIC | Age: 83
LOS: 1 days | Discharge: HOME | DRG: 267 | End: 2023-02-08
Attending: INTERNAL MEDICINE | Admitting: SURGERY
Payer: MEDICARE

## 2023-02-07 DIAGNOSIS — Z95.3 S/P TAVR (TRANSCATHETER AORTIC VALVE REPLACEMENT), BIOPROSTHETIC: ICD-10-CM

## 2023-02-07 DIAGNOSIS — I10 BENIGN ESSENTIAL HYPERTENSION: ICD-10-CM

## 2023-02-07 DIAGNOSIS — Z95.2 S/P TAVR (TRANSCATHETER AORTIC VALVE REPLACEMENT): Primary | ICD-10-CM

## 2023-02-07 DIAGNOSIS — I35.0 AORTIC STENOSIS, SEVERE: ICD-10-CM

## 2023-02-07 DIAGNOSIS — I35.0 NONRHEUMATIC AORTIC VALVE STENOSIS: ICD-10-CM

## 2023-02-07 DIAGNOSIS — I35.0 SEVERE AORTIC STENOSIS BY PRIOR ECHOCARDIOGRAM: ICD-10-CM

## 2023-02-07 DIAGNOSIS — Z29.8 * * * SBE PROPHYLAXIS * * *: ICD-10-CM

## 2023-02-07 LAB
ACT BLD: 126 SECONDS (ref 74–150)
ACT BLD: 284 SECONDS (ref 74–150)
BLD PROD TYP BPU: NORMAL
BLD PROD TYP BPU: NORMAL
BLOOD COMPONENT TYPE: NORMAL
BLOOD COMPONENT TYPE: NORMAL
CODING SYSTEM: NORMAL
CODING SYSTEM: NORMAL
CROSSMATCH: NORMAL
CROSSMATCH: NORMAL
GLUCOSE BLDC GLUCOMTR-MCNC: 118 MG/DL (ref 70–99)
GLUCOSE BLDC GLUCOMTR-MCNC: 144 MG/DL (ref 70–99)
GLUCOSE BLDC GLUCOMTR-MCNC: 93 MG/DL (ref 70–99)
GLUCOSE SERPL-MCNC: 151 MG/DL (ref 70–99)
ISSUE DATE AND TIME: NORMAL
ISSUE DATE AND TIME: NORMAL
LVEF ECHO: NORMAL
POTASSIUM SERPL-SCNC: 4.4 MMOL/L (ref 3.4–5.3)
UNIT ABO/RH: NORMAL
UNIT ABO/RH: NORMAL
UNIT NUMBER: NORMAL
UNIT NUMBER: NORMAL
UNIT STATUS: NORMAL
UNIT STATUS: NORMAL
UNIT TYPE ISBT: 6200
UNIT TYPE ISBT: 6200

## 2023-02-07 PROCEDURE — 85347 COAGULATION TIME ACTIVATED: CPT

## 2023-02-07 PROCEDURE — 4A023N7 MEASUREMENT OF CARDIAC SAMPLING AND PRESSURE, LEFT HEART, PERCUTANEOUS APPROACH: ICD-10-PCS | Performed by: INTERNAL MEDICINE

## 2023-02-07 PROCEDURE — 93325 DOPPLER ECHO COLOR FLOW MAPG: CPT | Mod: 26 | Performed by: INTERNAL MEDICINE

## 2023-02-07 PROCEDURE — 250N000009 HC RX 250: Performed by: INTERNAL MEDICINE

## 2023-02-07 PROCEDURE — 99221 1ST HOSP IP/OBS SF/LOW 40: CPT | Performed by: NURSE PRACTITIONER

## 2023-02-07 PROCEDURE — 36415 COLL VENOUS BLD VENIPUNCTURE: CPT | Performed by: ANESTHESIOLOGY

## 2023-02-07 PROCEDURE — 99153 MOD SED SAME PHYS/QHP EA: CPT | Performed by: INTERNAL MEDICINE

## 2023-02-07 PROCEDURE — 93321 DOPPLER ECHO F-UP/LMTD STD: CPT

## 2023-02-07 PROCEDURE — 33361 REPLACE AORTIC VALVE PERQ: CPT | Mod: 62 | Performed by: SURGERY

## 2023-02-07 PROCEDURE — 33361 REPLACE AORTIC VALVE PERQ: CPT | Performed by: INTERNAL MEDICINE

## 2023-02-07 PROCEDURE — 86923 COMPATIBILITY TEST ELECTRIC: CPT | Performed by: INTERNAL MEDICINE

## 2023-02-07 PROCEDURE — 93010 ELECTROCARDIOGRAM REPORT: CPT | Performed by: INTERNAL MEDICINE

## 2023-02-07 PROCEDURE — C1760 CLOSURE DEV, VASC: HCPCS | Performed by: INTERNAL MEDICINE

## 2023-02-07 PROCEDURE — 93325 DOPPLER ECHO COLOR FLOW MAPG: CPT

## 2023-02-07 PROCEDURE — 93005 ELECTROCARDIOGRAM TRACING: CPT

## 2023-02-07 PROCEDURE — 258N000003 HC RX IP 258 OP 636: Performed by: INTERNAL MEDICINE

## 2023-02-07 PROCEDURE — 278N000051 HC OR IMPLANT GENERAL: Performed by: INTERNAL MEDICINE

## 2023-02-07 PROCEDURE — 250N000009 HC RX 250: Performed by: ANESTHESIOLOGY

## 2023-02-07 PROCEDURE — 93308 TTE F-UP OR LMTD: CPT | Mod: 26 | Performed by: INTERNAL MEDICINE

## 2023-02-07 PROCEDURE — C1769 GUIDE WIRE: HCPCS | Performed by: INTERNAL MEDICINE

## 2023-02-07 PROCEDURE — C1730 CATH, EP, 19 OR FEW ELECT: HCPCS | Performed by: INTERNAL MEDICINE

## 2023-02-07 PROCEDURE — 93321 DOPPLER ECHO F-UP/LMTD STD: CPT | Mod: 26 | Performed by: INTERNAL MEDICINE

## 2023-02-07 PROCEDURE — 250N000011 HC RX IP 250 OP 636: Performed by: NURSE PRACTITIONER

## 2023-02-07 PROCEDURE — P9016 RBC LEUKOCYTES REDUCED: HCPCS | Performed by: INTERNAL MEDICINE

## 2023-02-07 PROCEDURE — 250N000011 HC RX IP 250 OP 636: Performed by: INTERNAL MEDICINE

## 2023-02-07 PROCEDURE — C1894 INTRO/SHEATH, NON-LASER: HCPCS | Performed by: INTERNAL MEDICINE

## 2023-02-07 PROCEDURE — 84132 ASSAY OF SERUM POTASSIUM: CPT | Performed by: ANESTHESIOLOGY

## 2023-02-07 PROCEDURE — 99152 MOD SED SAME PHYS/QHP 5/>YRS: CPT | Performed by: INTERNAL MEDICINE

## 2023-02-07 PROCEDURE — 210N000002 HC R&B HEART CARE

## 2023-02-07 PROCEDURE — 250N000013 HC RX MED GY IP 250 OP 250 PS 637: Performed by: NURSE PRACTITIONER

## 2023-02-07 PROCEDURE — 82947 ASSAY GLUCOSE BLOOD QUANT: CPT | Performed by: ANESTHESIOLOGY

## 2023-02-07 PROCEDURE — 33361 REPLACE AORTIC VALVE PERQ: CPT | Mod: 62 | Performed by: INTERNAL MEDICINE

## 2023-02-07 PROCEDURE — 02RF38Z REPLACEMENT OF AORTIC VALVE WITH ZOOPLASTIC TISSUE, PERCUTANEOUS APPROACH: ICD-10-PCS | Performed by: INTERNAL MEDICINE

## 2023-02-07 PROCEDURE — 272N000001 HC OR GENERAL SUPPLY STERILE: Performed by: INTERNAL MEDICINE

## 2023-02-07 DEVICE — DEVICE CLOSURE VASCULAR MANTA 18FR 2115: Type: IMPLANTABLE DEVICE | Status: FUNCTIONAL

## 2023-02-07 RX ORDER — ONDANSETRON 4 MG/1
4 TABLET, ORALLY DISINTEGRATING ORAL EVERY 6 HOURS PRN
Status: DISCONTINUED | OUTPATIENT
Start: 2023-02-07 | End: 2023-02-07

## 2023-02-07 RX ORDER — NICOTINE POLACRILEX 4 MG
15-30 LOZENGE BUCCAL
Status: DISCONTINUED | OUTPATIENT
Start: 2023-02-07 | End: 2023-02-07

## 2023-02-07 RX ORDER — ONDANSETRON 2 MG/ML
4 INJECTION INTRAMUSCULAR; INTRAVENOUS EVERY 6 HOURS PRN
Status: DISCONTINUED | OUTPATIENT
Start: 2023-02-07 | End: 2023-02-08 | Stop reason: HOSPADM

## 2023-02-07 RX ORDER — SODIUM CHLORIDE 9 MG/ML
INJECTION, SOLUTION INTRAVENOUS CONTINUOUS
Status: DISCONTINUED | OUTPATIENT
Start: 2023-02-07 | End: 2023-02-07 | Stop reason: HOSPADM

## 2023-02-07 RX ORDER — ACETAMINOPHEN 325 MG/1
650 TABLET ORAL EVERY 4 HOURS PRN
Status: DISCONTINUED | OUTPATIENT
Start: 2023-02-07 | End: 2023-02-08 | Stop reason: HOSPADM

## 2023-02-07 RX ORDER — NALOXONE HYDROCHLORIDE 0.4 MG/ML
0.2 INJECTION, SOLUTION INTRAMUSCULAR; INTRAVENOUS; SUBCUTANEOUS
Status: DISCONTINUED | OUTPATIENT
Start: 2023-02-07 | End: 2023-02-08 | Stop reason: HOSPADM

## 2023-02-07 RX ORDER — DOXAZOSIN 2 MG/1
2 TABLET ORAL AT BEDTIME
Status: DISCONTINUED | OUTPATIENT
Start: 2023-02-07 | End: 2023-02-08 | Stop reason: HOSPADM

## 2023-02-07 RX ORDER — ROSUVASTATIN CALCIUM 10 MG/1
10 TABLET, COATED ORAL AT BEDTIME
Status: DISCONTINUED | OUTPATIENT
Start: 2023-02-07 | End: 2023-02-08 | Stop reason: HOSPADM

## 2023-02-07 RX ORDER — PROCHLORPERAZINE MALEATE 5 MG
5 TABLET ORAL EVERY 6 HOURS PRN
Status: DISCONTINUED | OUTPATIENT
Start: 2023-02-07 | End: 2023-02-08 | Stop reason: HOSPADM

## 2023-02-07 RX ORDER — SODIUM CHLORIDE, SODIUM LACTATE, POTASSIUM CHLORIDE, CALCIUM CHLORIDE 600; 310; 30; 20 MG/100ML; MG/100ML; MG/100ML; MG/100ML
INJECTION, SOLUTION INTRAVENOUS CONTINUOUS
Status: DISCONTINUED | OUTPATIENT
Start: 2023-02-07 | End: 2023-02-07 | Stop reason: HOSPADM

## 2023-02-07 RX ORDER — FENTANYL CITRATE 50 UG/ML
INJECTION, SOLUTION INTRAMUSCULAR; INTRAVENOUS
Status: DISCONTINUED | OUTPATIENT
Start: 2023-02-07 | End: 2023-02-07 | Stop reason: HOSPADM

## 2023-02-07 RX ORDER — HEPARIN SODIUM 1000 [USP'U]/ML
INJECTION, SOLUTION INTRAVENOUS; SUBCUTANEOUS
Status: DISCONTINUED | OUTPATIENT
Start: 2023-02-07 | End: 2023-02-07 | Stop reason: HOSPADM

## 2023-02-07 RX ORDER — ONDANSETRON 4 MG/1
4 TABLET, ORALLY DISINTEGRATING ORAL EVERY 6 HOURS PRN
Status: DISCONTINUED | OUTPATIENT
Start: 2023-02-07 | End: 2023-02-08 | Stop reason: HOSPADM

## 2023-02-07 RX ORDER — PROTAMINE SULFATE 10 MG/ML
INJECTION, SOLUTION INTRAVENOUS
Status: DISCONTINUED | OUTPATIENT
Start: 2023-02-07 | End: 2023-02-07 | Stop reason: HOSPADM

## 2023-02-07 RX ORDER — HYDRALAZINE HYDROCHLORIDE 20 MG/ML
10 INJECTION INTRAMUSCULAR; INTRAVENOUS
Status: DISCONTINUED | OUTPATIENT
Start: 2023-02-07 | End: 2023-02-08 | Stop reason: HOSPADM

## 2023-02-07 RX ORDER — NICOTINE POLACRILEX 4 MG
15-30 LOZENGE BUCCAL
Status: DISCONTINUED | OUTPATIENT
Start: 2023-02-07 | End: 2023-02-08 | Stop reason: HOSPADM

## 2023-02-07 RX ORDER — DILTIAZEM HYDROCHLORIDE 240 MG/1
240 CAPSULE, COATED, EXTENDED RELEASE ORAL DAILY
Status: DISCONTINUED | OUTPATIENT
Start: 2023-02-07 | End: 2023-02-08 | Stop reason: HOSPADM

## 2023-02-07 RX ORDER — NALOXONE HYDROCHLORIDE 0.4 MG/ML
0.4 INJECTION, SOLUTION INTRAMUSCULAR; INTRAVENOUS; SUBCUTANEOUS
Status: DISCONTINUED | OUTPATIENT
Start: 2023-02-07 | End: 2023-02-08 | Stop reason: HOSPADM

## 2023-02-07 RX ORDER — PROCHLORPERAZINE 25 MG
12.5 SUPPOSITORY, RECTAL RECTAL EVERY 12 HOURS PRN
Status: DISCONTINUED | OUTPATIENT
Start: 2023-02-07 | End: 2023-02-08 | Stop reason: HOSPADM

## 2023-02-07 RX ORDER — OXYCODONE HYDROCHLORIDE 5 MG/1
10 TABLET ORAL EVERY 4 HOURS PRN
Status: DISCONTINUED | OUTPATIENT
Start: 2023-02-07 | End: 2023-02-08 | Stop reason: HOSPADM

## 2023-02-07 RX ORDER — DEXTROSE MONOHYDRATE 25 G/50ML
25-50 INJECTION, SOLUTION INTRAVENOUS
Status: DISCONTINUED | OUTPATIENT
Start: 2023-02-07 | End: 2023-02-08 | Stop reason: HOSPADM

## 2023-02-07 RX ORDER — ONDANSETRON 2 MG/ML
4 INJECTION INTRAMUSCULAR; INTRAVENOUS EVERY 6 HOURS PRN
Status: DISCONTINUED | OUTPATIENT
Start: 2023-02-07 | End: 2023-02-07

## 2023-02-07 RX ORDER — DEXTROSE MONOHYDRATE 25 G/50ML
25-50 INJECTION, SOLUTION INTRAVENOUS
Status: DISCONTINUED | OUTPATIENT
Start: 2023-02-07 | End: 2023-02-07

## 2023-02-07 RX ORDER — NITROGLYCERIN 0.4 MG/1
0.4 TABLET SUBLINGUAL EVERY 5 MIN PRN
Status: DISCONTINUED | OUTPATIENT
Start: 2023-02-07 | End: 2023-02-08 | Stop reason: HOSPADM

## 2023-02-07 RX ORDER — CEFAZOLIN SODIUM/WATER 2 G/20 ML
2 SYRINGE (ML) INTRAVENOUS
Status: COMPLETED | OUTPATIENT
Start: 2023-02-07 | End: 2023-02-07

## 2023-02-07 RX ORDER — ASPIRIN 81 MG/1
81 TABLET ORAL DAILY
Status: DISCONTINUED | OUTPATIENT
Start: 2023-02-08 | End: 2023-02-08 | Stop reason: HOSPADM

## 2023-02-07 RX ORDER — IOPAMIDOL 755 MG/ML
INJECTION, SOLUTION INTRAVASCULAR
Status: DISCONTINUED | OUTPATIENT
Start: 2023-02-07 | End: 2023-02-07 | Stop reason: HOSPADM

## 2023-02-07 RX ORDER — LIDOCAINE 40 MG/G
CREAM TOPICAL
Status: DISCONTINUED | OUTPATIENT
Start: 2023-02-07 | End: 2023-02-07 | Stop reason: HOSPADM

## 2023-02-07 RX ORDER — ASPIRIN 325 MG
325 TABLET ORAL ONCE
Status: COMPLETED | OUTPATIENT
Start: 2023-02-07 | End: 2023-02-07

## 2023-02-07 RX ADMIN — Medication 2 G: at 07:56

## 2023-02-07 RX ADMIN — HYDRALAZINE HYDROCHLORIDE 10 MG: 20 INJECTION INTRAMUSCULAR; INTRAVENOUS at 16:11

## 2023-02-07 RX ADMIN — HYDRALAZINE HYDROCHLORIDE 10 MG: 20 INJECTION INTRAMUSCULAR; INTRAVENOUS at 12:01

## 2023-02-07 RX ADMIN — SODIUM CHLORIDE: 9 INJECTION, SOLUTION INTRAVENOUS at 06:41

## 2023-02-07 RX ADMIN — DOXAZOSIN 2 MG: 2 TABLET ORAL at 21:43

## 2023-02-07 RX ADMIN — DILTIAZEM HYDROCHLORIDE 240 MG: 240 CAPSULE, COATED, EXTENDED RELEASE ORAL at 15:09

## 2023-02-07 RX ADMIN — ROSUVASTATIN CALCIUM 10 MG: 10 TABLET, FILM COATED ORAL at 21:43

## 2023-02-07 RX ADMIN — LIDOCAINE HYDROCHLORIDE 1 ML: 10 INJECTION, SOLUTION EPIDURAL; INFILTRATION; INTRACAUDAL; PERINEURAL at 06:45

## 2023-02-07 ASSESSMENT — ACTIVITIES OF DAILY LIVING (ADL)
ADLS_ACUITY_SCORE: 20
ADLS_ACUITY_SCORE: 33
ADLS_ACUITY_SCORE: 20

## 2023-02-07 NOTE — Clinical Note
Temporary pacemaker Rate= 100bpmPaced mA= 10Pacer wire was captured appropriately and Demand on Standby

## 2023-02-07 NOTE — OP NOTE
Procedure Date: 2/7/2023     PREOPERATIVE DIAGNOSES:   1.  Symptomatic severe aortic valve stenosis.      POSTOPERATIVE DIAGNOSES:   1.  Symptomatic severe aortic valve stenosis.      PROCEDURE PERFORMED:  Right transfemoral implantation of a 29 mm Villanueva Davi 3 ultra bioprosthesis.      INTERVENTIONAL CARDIOLOGISTS:  Juan Jang MD; Edwin Meadows MD      CARDIAC SURGEON:  Josh Loredo MD      ANESTHESIA:  Monitored anesthesia care with local.      INDICATIONS FOR PROCEDURE:  The patient is diagnosed with severe symptomatic aortic valve stenosis. The case was discussed with heart team and we presented the option for TAVR due to comorbidities and favorable candidacy for TAVR.  Following discussion of risks and benefits, patient elected to proceed.      DESCRIPTION OF PROCEDURE:  The patient was brought to the Cath Lab and placed supine on the table.  Appropriate monitoring lines were placed and monitored anesthesia care was delivered.  The patient was sterilely prepped and draped in the usual fashion and timeout was performed to confirm patient identity, procedure to be performed and the administration of preoperative antibiotics.      Combination of ultrasound and fluoroscopy was utilized to obtain bilateral common femoral arterial access and left ght common femoral venous access.  This was exchanged for sheath access.  A temporary pacemaker was advanced to the RV.  A pigtail catheter was advanced from the left-sided arterial access into the aortic root.  The large bore sheath was then placed on the Left over a stiff wire.  The patient was systemically heparinized.      The native valve was crossed with an AL1 catheter and a straight wire.  This was exchanged for a J wire followed by a pigtail followed by a Safari.  The valve delivery system was advanced over the Safari wire into position within the aortic root.  Under rapid pacing, the valve was deployed with an excellent fluoroscopic result.  Upon  cessation of pacing, the patient's native rhythm and hemodynamics recovered promptly.      The valve delivery system was withdrawn from within the aortic root and root aortography and echocardiography confirmed excellent valve position and function without any perivalvular insufficiency.      The valve delivery system was fully withdrawn.  The Safari wire was recaptured within a pigtail and removed.  Protamine was administered to reverse the patient's heparinization.      The temporary pacemaker was removed.  The left-sided pigtail was withdrawn to the level of the iliac bifurcation.  The large bore sheath was removed over an access wire and the Manta device was deployed.  There appeared to be appropriate hemostasis.    Completion iliofemoral angiography revealed no extravasation or stenosis.  The left-sided arterial access was managed with Angio-Seal and manual pressure was utilized for the venous puncture site.      At the end of the procedure, all counts were correct.  The patient was taken to recovery in stable condition.      Josh Loredo MD   Cardiothoracic Surgery  P: [phone]  C: [phone]

## 2023-02-07 NOTE — PLAN OF CARE
Goal Outcome Evaluation:      Plan of Care Reviewed With: patient      Patient is come from pacu after procedure, denies pain, bedrest, tolerated ice chips, VSS, RA, groin sites intact no bleeding no hematoma no bruit. Neuro intact.

## 2023-02-07 NOTE — CONSULTS
"[facility]  Consult Note - Hospitalist Service  Date of Admission:  2/7/2023  Consult Requested by: Zeenat Fox CNP  Reason for Consult: Medical Management s/p TAVR    Assessment & Plan   Reynaldo Monteiro is a 82 year old male admitted on 2/7/2023. He has a past medical history of severe aortic stenosis, chronic diastolic heart failure, dyslipidemia, hypertension, chronic RBBB with transient CHB (resolved), diabetes mellitus, type II who is admitted for elective TAVR.    Severe aortic stenosis s/p TAVR, 2/7/2023  Procedure completed by Dr. Jang, with 29 mm Villanueva FRIDA 3 valve.  It is noted the patient had transient complete heart block postprocedure which has resolved completely.   - Postprocedure care provided by cardiology, please see their H&P for full details  -Echo tomorrow  -Neurochecks per protocol    Diabetes mellitus, type II, well controlled  [PTA Jardiance]  Most recent hemoglobin A1c 6.1% per patient (labs unavailable to writer)  -Primary service has entered QID glucose monitoring with medium sliding scale coverage  -Hold Jardiance w/  Plans to restart prior to discharge     Dyslipidemia  -Continue statin    Hypertension  -Continue PTA diltiazem  mg daily (per cards)  -Hold metoprolol XL in light of recent CHP    Chronic diastolic heart failure  Not currently on any diuretic therapy  -Manage per cardiology service     The patient's care was discussed with the Attending Physician, Dr. Brown, Bedside Nurse, Patient and Patient's Family.    Clinically Significant Risk Factors Present on Admission                  # Hypertension: home medication list includes antihypertensive(s)      # Overweight: Estimated body mass index is 26.5 kg/m  as calculated from the following:    Height as of this encounter: 1.803 m (5' 11\").    Weight as of this encounter: 86.2 kg (190 lb).           SHAWN Churchill CNP  Hospitalist Service  Securely message with Josefina (more " info)  Text page via [facility] Paging/Directory   ______________________________________________________________________    Chief Complaint   Elective TAVR    History is obtained from the patient    History of Present Illness   Reynaldo Monteiro is a 82 year old male who has a past medical history of severe aortic stenosis, chronic diastolic heart failure, dyslipidemia, hypertension, chronic RBBB with transient CHB (resolved), diabetes mellitus, type II who is admitted for elective TAVR.    Patient is POD #0 following a transcatheter aortic valve replacement with Dr. Jang.  A 29 mm Villanueva FRIDA 3 valve was placed.  Patient did have transient CHB post procedure which resolved on its own.  Neuro 7 intact, a plan as formulated by the primary cardiology service.    I evaluated the patient post procedure, he is lying flat in bed, with family at the bedside. He is in no distress. He denies any pain, shortness of breath, nausea vomiting. Prior to admission, the patient denies any fever, chills, respiratory symptoms, urinary issues, n/v, appetite changes, or abnormal bleeding.       Past Medical History    Past Medical History:   Diagnosis Date     Aortic valve stenosis      Esophagitis, reflux      Hyperlipidemia      Hypertension      Lumbar disc disorder with myelopathy     bilateral leg weakness     RBBB (right bundle branch block)      S/p TAVR (transcatheter aortic valve replacement), bioprosthetic 2/7/2023     Severe aortic valve stenosis      Type 2 diabetes mellitus (H)        Past Surgical History   Past Surgical History:   Procedure Laterality Date     ANGIOGRAM      Patient reports coronary angiogram at [place], [place] approx 10 years ago     CV CORONARY ANGIOGRAM N/A 11/29/2022    Procedure: Coronary Angiogram;  Surgeon: Edwin Meadows MD;  Location:  HEART CARDIAC CATH LAB       Medications   I have reviewed this patient's current medications       Review of Systems    The 10 point Review of Systems  is negative other than noted in the HPI or here.     Social History   I have reviewed this patient's social history and updated it with pertinent information if needed.  Social History     Tobacco Use     Smoking status: Never     Smokeless tobacco: Never   Substance Use Topics     Alcohol use: Yes     Comment: 1-2 a week      Drug use: No        Physical Exam   Vital Signs: Temp: 97.7  F (36.5  C) Temp src: Oral BP: (!) 159/91 Pulse: 62   Resp: 14 SpO2: 99 % O2 Device: None (Room air) Oxygen Delivery: 2 LPM  Weight: 190 lbs 0 oz    Constitutional: awake, alert, cooperative, no apparent distress, and appears stated age  Respiratory: clear to anterior auscultation  Cardiovascular: regular rate and rhythm, no murmur, no dependent edema  GI:  Soft, non tender, non distended   Skin: warm and dry, bilateral femoral sites are bruised, mildly swollen, but soft and non tender   Neurologic: no focal deficits   Neuropsychiatric: General: normal, calm and normal eye contact  Level of consciousness: alert / normal    Medical Decision Making     30 MINUTES SPENT BY ME on the date of service doing chart review, history, exam, documentation & further activities per the note.      Data     I have personally reviewed the following data over the past 24 hrs:    N/A  \   N/A   / N/A     N/A N/A N/A /  118 (H)   4.4 N/A N/A \       Imaging results reviewed over the past 24 hrs:   Recent Results (from the past 24 hour(s))   Cardiac Catheterization    Narrative      Successful TAVR with Villanueva 29 Ultra valve. Post gradient 5 mm Hg.    No paravalvular leak.      Echocardiogram Complete   Result Value    LVEF  60-65%    Narrative    [id number]  [id number]  [id number]  [id number]^JOSÉ MIGUEL^J CARLOS^FOX     [facility]  Echocardiography Laboratory  [address] [place] [address]     Name: BUTCH MANUEL  MRN: [id number]  : 1940  Study Date: 2023 07:07 AM  Age: 82 yrs  Gender: Male  Patient Location:  [facility]  Reason For Study: Severe aortic stenosis by prior echocardiogram  Ordering Physician: J CARLOS VALDEZ  Referring Physician: Mick Torres  Performed By: Argenis Christian     BSA: 2.1 m2  Height: 71 in  Weight: 190 lb  BP: 150/95 mmHg  ______________________________________________________________________________  Procedure  Complete Portable Echo Adult.  ______________________________________________________________________________  Interpretation Summary     PROCEDURE  Intra-procedural transthoracic echocardiogram for transfemoral transcatheter  aortic valve replacement with Villanueva 29 Ultra valve.     PRE-PROCEDURAL FINDINGS:  1. Severe calcific aortic valve stenosis with trace aortic regurgitation.  2. Normal left ventricular systolic function. Visually estimated LVEF 60-65%.     POST-PROCEDURAL SURVEY:  1. The valve was successfully deployed.  2. Valve is well seated. No prosthetic or vandana-prosthetic regurgitation. Mean  systolic gradient 5 mmHg.  3. No pericardial effusion.     ______________________________________________________________________________  Left Ventricle  The left ventricle is normal in size. There is normal left ventricular wall  thickness. Left ventricular systolic function is normal. The visual ejection  fraction is 60-65%. Septal motion is consistent with conduction abnormality.     Right Ventricle  The right ventricle is normal in size and function.     Atria  The left atrium is moderately dilated. Right atrial size is normal.     Mitral Valve  The mitral valve leaflets are mildly thickened. There is moderate mitral  annular calcification. There is trace mitral regurgitation. There is no mitral  valve stenosis. The mean mitral valve gradient is 2mmHg. Heart rate 70 BPM.     Tricuspid Valve  Normal tricuspid valve. There is trace tricuspid regurgitation. Right  ventricular systolic pressure could not be approximated due to inadequate  tricuspid regurgitation.     Aortic Valve  The  aortic valve is trileaflet. There is trace to mild aortic regurgitation.  Severe valvular aortic stenosis.     Pulmonic Valve  The pulmonic valve is not well visualized.     Vessels  The aortic root is normal size. The inferior vena cava is normal.     Pericardium  There is no pericardial effusion.     Rhythm  Sinus rhythm was noted.  ______________________________________________________________________________  Doppler Measurements & Calculations  Ao V2 max: 173.7 cm/sec  Ao max P.0 mmHg  Ao V2 mean: 109.5 cm/sec  Ao mean P.5 mmHg  Ao V2 VTI: 33.6 cm     ______________________________________________________________________________  Report approved by: Dr Kulwinder Pitts 2023 10:49 AM

## 2023-02-07 NOTE — H&P
"  [facility]      Cardiology H&P  Reynaldo Monteiro MRN: [id number]  1940  Date of Admission:2/7/2023  Primary care provider: Mick Torres      Assessment and Plan:     Reynaldo Monteiro is pleasant 82 y.o gentleman admitted on 2/7/2023 for elective TAVR.      # Severe aortic stenosis, now s/p Transcatheter Aortic Valve Replacement  Prior to procedure, pt noted to have a mean gradient 39 mmHG, ZAKI 0.95 Cm^2, peak velocity 4.1 M/s. Now s/p 29 mm Villanueva Davi 3 valve. Sheath sites soft without hematoma. Patient has transient complete heart block post-operatively which resolved. Neuro's intact.     - Bedrest per protocol   - Neuro checks, per protocol  - Echocardiogram tomorrow  - Monitor groin sites  - EKG in morning   - ASA 81 mg for anti-platelet therapy  - Cardiac rehab/PT/OT  - Continuous telemetry monitoring  - Lifelong antibiotic prophylaxis prior to all dental procedures.      # Chronic diastolic heart failure, NYHA class II  NTpBNP 145, euvolemic on exam  - Diurese prn   - Strict I&O's  - Daily weights     # Hyperlipidemia  - Continue PTA rosuvastatin 10 mg daily     # Hypertension  - Continue PTA diltiazem  mg daily   - Hold PTA Toprol XL     # Chronic RBBB  # Transient CHB, resolved   - Monitor on telemetry   - Holding PTA Toprol XL     # Type II DM  - sliding scale insulin while inpatient  - Q4h blood glucose checks   - Hypoglycemia protocol   - Holding PTA Jardiance       FEN: Cardiac diet  Disposition: Home in 1-2 days with cardiac rehab pending stable post-op period  Code Status: FULL CODE    Sachin Jang MD      Clinically Significant Risk Factors Present on Admission                # Overweight: Estimated body mass index is 26.5 kg/m  as calculated from the following:    Height as of this encounter: 1.803 m (5' 11\").    Weight as of this encounter: 86.2 kg (190 lb).                Chief Complaint:   Planned TAVR         History of Present Illness:   Reynaldo Monteiro is a very " pleasant 82-year-old gentleman with a past medical history significant for type 2 diabetes, hypertension, hyperlipidemia, chronic diastolic heart failure, chronic RBBB, and severe aortic stenosis.      He has been followed closely by Dr. Villatoro in regards to his aortic stenosis.  He has had multiple prior echocardiograms over the years.  Most recent echocardiogram showed progression of his aortic stenosis, now consistent with severe aortic stenosis characterized by valve area of 0.95 cm2, V-max 4.1 m/s, mean gradient of 35 mmHg, and a dimensionless index of 0.23.  His LV function remains preserved with an EF of 60 to 65%.       He was referred to our structural heart clinic and was seen by Dr. Meadows, who deemed him an appropriate TAVR candidate.  He underwent TAVR guided CT that showed favorable anatomy for transfemoral approach with an aortic valve calcium score of 1895.  More recently, he underwent coronary angiography on 11/29/2022 that showed nonobstructive coronary disease.     Patient is now s/p TAVR with 29 mm Villanueva Davi 3 valve. Procedure went well without complication. He had transient complete heart block post-deployment of valve, which has now resolved.     At time of interview, patient reports doing well. He denies any pain. Hemodynamically stable.            Review of Systems:    10 point review of systems negative except for stated above in HPI.          Past Medical History:   Medical History reviewed.   Past Medical History:   Diagnosis Date     Aortic valve stenosis      Esophagitis, reflux      Hyperlipidemia      Hypertension      Lumbar disc disorder with myelopathy     bilateral leg weakness     RBBB (right bundle branch block)      Severe aortic valve stenosis      Type 2 diabetes mellitus (H)              Past Surgical History:   Surgical History reviewed.   Past Surgical History:   Procedure Laterality Date     ANGIOGRAM      Patient reports coronary angiogram at [place], [place] approx  "10 years ago     CV CORONARY ANGIOGRAM N/A 11/29/2022    Procedure: Coronary Angiogram;  Surgeon: Edwin Meadows MD;  Location:  HEART CARDIAC CATH LAB             Social History:   Social History reviewed.  Social History     Tobacco Use     Smoking status: Never     Smokeless tobacco: Never   Substance Use Topics     Alcohol use: Yes     Comment: 1-2 a week              Family History:   Family History reviewed.   Family History   Problem Relation Age of Onset     Hypertension Mother         valve replacement      Coronary Artery Disease Father              Allergies:   No Known Allergies          Medications:   Medications Reviewed.   Current Facility-Administered Medications   Medication     ceFAZolin Sodium (ANCEF) injection 2 g     HOLD: metformin (GLUCOPHAGE, GLUMETZA, FORTAMET, RIOMET) and metformin containing medications: alogliptin/metformin (KAZANO), glipizide/metformin (METAGLIP), glyburide/metformin (GLUCOVANCE), rosiglitazone/metformin (AVANDAMET), dapagliflozin/metformin (XIGDUO XR), sitagliptin/metformin (JANUMET, JANUMET XR), linagliptin/metformin (JENTADUETO), repaglinide/metformin (PRANDIMET), saxagliptin/metformin (KOMBIGLYZE XR), canagliflozin/metformin (INVOKAMET), and pioglitazone/metformin (ACTOPLUS MET, ACTOPLUS MET XR) the morning of the procedure.     lactated ringers infusion     lidocaine (LMX4) cream     lidocaine 1 % 0.1-1 mL     lidocaine 1 % 0.1-1 mL     Patient is NOT allergic to contrast dye     sodium chloride (PF) 0.9% PF flush 3 mL     sodium chloride (PF) 0.9% PF flush 3 mL     sodium chloride (PF) 0.9% PF flush 3 mL     sodium chloride 0.9% infusion             Physical Exam:   Vitals were reviewed.  Blood pressure (!) 150/95, pulse 68, temperature 97.7  F (36.5  C), temperature source Temporal, resp. rate 12, height 1.803 m (5' 11\"), weight 86.2 kg (190 lb), SpO2 95 %.    General: AAOx3, NAD  Skin: Not jaundiced, no rash, no ecchymoses; incision site CDI, soft, " non-tender, no signs of hematoma. No bruit  HEENT: MMM, PERRLA, EOM intact  CV: RRR, normal S1S2, grade II LORE, clicks, rubs  Resp: Clear to auscultation bilaterally, no wheezes, rhonchi  Abd: Soft, non-tender, BS+, no masses appreciated  Extremities: warm and well perfused, palpable pulses, no edema, groin sites soft without hematoma   Neuro: No lateralizing symptoms or focal neurologic deficits          Labs:   Routine Labs:  Lab Results   Component Value Date    TROPI 0.031 10/21/2009    TROPI <0.012 10/20/2009    TROPI <0.012 10/20/2009     CMP  Recent Labs   Lab 02/07/23  0607 01/31/23  1038   NA  --  140   POTASSIUM 4.4 4.1   CHLORIDE  --  102   CO2  --  27   ANIONGAP  --  11   * 167*   BUN  --  17.7   CR  --  0.78   GFRESTIMATED  --  90   GIULIANO  --  9.3     CBC  Recent Labs   Lab 01/31/23  1038   WBC 7.2   RBC 5.26   HGB 15.6   HCT 47.3   MCV 90   MCH 29.7   MCHC 33.0   RDW 13.2        INR  Recent Labs   Lab 01/31/23  1038   INR 0.98           Diagnostics:    EKG 12Lead: Chronic RBBB    Echo: 10/12/2022  Interpretation Summary     The visual ejection fraction is 60-65%.  The right ventricle is normal in size and function.  Severe AS is noted. Peak velocity is 4.1 m/sec. ZAKI is 1 cm2.  The inferior vena cava was normal in size with preserved respiratory  variability.  There is no pericardial effusion.    Coronary Angiogram/Right heart Cath:  Conclusion    Non obstructive coronary artery disease   Hemostasis of RRA with TR band       Plan      Follow bedrest per protocol    Continued medical management and lifestyle modifications for cardiovascular risk factor optimizations.    Arterial sheath removed from radial artery with TR band placement.    Discharge today per protocol         Medical Decision Making       50 MINUTES SPENT BY ME on the date of service doing chart review, history, exam, documentation & further activities per the note.  MANAGEMENT DISCUSSED with the following over the past 24  hours: Dr. Suhail Fox, CNP

## 2023-02-07 NOTE — Clinical Note
Hemodynamic equipment used: 5 lead ECG, GlobiliK With 3 Leads, Machine BP Cuff and pulse oximeter probe.

## 2023-02-07 NOTE — Clinical Note
Potential access sites were evaluated for patency using ultrasound.   The left femoral artery was selected. Access was obtained under with Sonosite and Fluoroscopic guidance using a micropuncture 21 gauge needle with direct visualization of needle entry.

## 2023-02-08 ENCOUNTER — APPOINTMENT (OUTPATIENT)
Dept: CARDIOLOGY | Facility: CLINIC | Age: 83
DRG: 267 | End: 2023-02-08
Attending: NURSE PRACTITIONER
Payer: MEDICARE

## 2023-02-08 ENCOUNTER — APPOINTMENT (OUTPATIENT)
Dept: PHYSICAL THERAPY | Facility: CLINIC | Age: 83
DRG: 267 | End: 2023-02-08
Attending: NURSE PRACTITIONER
Payer: MEDICARE

## 2023-02-08 VITALS
RESPIRATION RATE: 20 BRPM | OXYGEN SATURATION: 94 % | SYSTOLIC BLOOD PRESSURE: 129 MMHG | HEIGHT: 71 IN | TEMPERATURE: 98.5 F | DIASTOLIC BLOOD PRESSURE: 77 MMHG | WEIGHT: 190 LBS | BODY MASS INDEX: 26.6 KG/M2 | HEART RATE: 76 BPM

## 2023-02-08 LAB
ANION GAP SERPL CALCULATED.3IONS-SCNC: 10 MMOL/L (ref 7–15)
BUN SERPL-MCNC: 13.6 MG/DL (ref 8–23)
CALCIUM SERPL-MCNC: 9.2 MG/DL (ref 8.8–10.2)
CHLORIDE SERPL-SCNC: 99 MMOL/L (ref 98–107)
CREAT SERPL-MCNC: 0.73 MG/DL (ref 0.67–1.17)
DEPRECATED HCO3 PLAS-SCNC: 27 MMOL/L (ref 22–29)
ERYTHROCYTE [DISTWIDTH] IN BLOOD BY AUTOMATED COUNT: 13.5 % (ref 10–15)
GFR SERPL CREATININE-BSD FRML MDRD: >90 ML/MIN/1.73M2
GLUCOSE BLDC GLUCOMTR-MCNC: 141 MG/DL (ref 70–99)
GLUCOSE BLDC GLUCOMTR-MCNC: 142 MG/DL (ref 70–99)
GLUCOSE SERPL-MCNC: 142 MG/DL (ref 70–99)
HCT VFR BLD AUTO: 46.5 % (ref 40–53)
HGB BLD-MCNC: 15.2 G/DL (ref 13.3–17.7)
LVEF ECHO: NORMAL
MAGNESIUM SERPL-MCNC: 2 MG/DL (ref 1.7–2.3)
MCH RBC QN AUTO: 29.3 PG (ref 26.5–33)
MCHC RBC AUTO-ENTMCNC: 32.7 G/DL (ref 31.5–36.5)
MCV RBC AUTO: 90 FL (ref 78–100)
PHOSPHATE SERPL-MCNC: 3.7 MG/DL (ref 2.5–4.5)
PLATELET # BLD AUTO: 120 10E3/UL (ref 150–450)
POTASSIUM SERPL-SCNC: 4 MMOL/L (ref 3.4–5.3)
RBC # BLD AUTO: 5.18 10E6/UL (ref 4.4–5.9)
SODIUM SERPL-SCNC: 136 MMOL/L (ref 136–145)
WBC # BLD AUTO: 10.8 10E3/UL (ref 4–11)

## 2023-02-08 PROCEDURE — 97161 PT EVAL LOW COMPLEX 20 MIN: CPT | Mod: GP

## 2023-02-08 PROCEDURE — 93325 DOPPLER ECHO COLOR FLOW MAPG: CPT | Mod: 26 | Performed by: INTERNAL MEDICINE

## 2023-02-08 PROCEDURE — 99232 SBSQ HOSP IP/OBS MODERATE 35: CPT | Performed by: INTERNAL MEDICINE

## 2023-02-08 PROCEDURE — 93228 REMOTE 30 DAY ECG REV/REPORT: CPT | Performed by: INTERNAL MEDICINE

## 2023-02-08 PROCEDURE — 93321 DOPPLER ECHO F-UP/LMTD STD: CPT | Mod: 26 | Performed by: INTERNAL MEDICINE

## 2023-02-08 PROCEDURE — 93308 TTE F-UP OR LMTD: CPT | Mod: 26 | Performed by: INTERNAL MEDICINE

## 2023-02-08 PROCEDURE — 97110 THERAPEUTIC EXERCISES: CPT | Mod: GP

## 2023-02-08 PROCEDURE — 99238 HOSP IP/OBS DSCHRG MGMT 30/<: CPT | Mod: FS | Performed by: NURSE PRACTITIONER

## 2023-02-08 PROCEDURE — 999N000208 ECHOCARDIOGRAM LIMITED

## 2023-02-08 PROCEDURE — 80048 BASIC METABOLIC PNL TOTAL CA: CPT | Performed by: NURSE PRACTITIONER

## 2023-02-08 PROCEDURE — 93010 ELECTROCARDIOGRAM REPORT: CPT | Performed by: INTERNAL MEDICINE

## 2023-02-08 PROCEDURE — 85014 HEMATOCRIT: CPT | Performed by: NURSE PRACTITIONER

## 2023-02-08 PROCEDURE — 93321 DOPPLER ECHO F-UP/LMTD STD: CPT

## 2023-02-08 PROCEDURE — 255N000002 HC RX 255 OP 636: Performed by: INTERNAL MEDICINE

## 2023-02-08 PROCEDURE — 999N000096 CARDIAC MOBILE TELEMETRY MONITOR

## 2023-02-08 PROCEDURE — 84100 ASSAY OF PHOSPHORUS: CPT | Performed by: NURSE PRACTITIONER

## 2023-02-08 PROCEDURE — 36415 COLL VENOUS BLD VENIPUNCTURE: CPT | Performed by: NURSE PRACTITIONER

## 2023-02-08 PROCEDURE — 83735 ASSAY OF MAGNESIUM: CPT | Performed by: NURSE PRACTITIONER

## 2023-02-08 PROCEDURE — 97530 THERAPEUTIC ACTIVITIES: CPT | Mod: GP

## 2023-02-08 PROCEDURE — 250N000013 HC RX MED GY IP 250 OP 250 PS 637: Performed by: NURSE PRACTITIONER

## 2023-02-08 PROCEDURE — 93005 ELECTROCARDIOGRAM TRACING: CPT

## 2023-02-08 RX ORDER — LISINOPRIL 2.5 MG/1
2.5 TABLET ORAL DAILY
Qty: 30 TABLET | Refills: 0 | Status: SHIPPED | OUTPATIENT
Start: 2023-02-09 | End: 2023-02-08

## 2023-02-08 RX ORDER — LISINOPRIL 5 MG/1
5 TABLET ORAL DAILY
Qty: 30 TABLET | Refills: 0 | Status: ON HOLD | OUTPATIENT
Start: 2023-02-09 | End: 2023-02-14

## 2023-02-08 RX ORDER — LISINOPRIL 2.5 MG/1
2.5 TABLET ORAL DAILY
Status: DISCONTINUED | OUTPATIENT
Start: 2023-02-08 | End: 2023-02-08

## 2023-02-08 RX ORDER — LISINOPRIL 2.5 MG/1
2.5 TABLET ORAL ONCE
Status: COMPLETED | OUTPATIENT
Start: 2023-02-08 | End: 2023-02-08

## 2023-02-08 RX ORDER — LISINOPRIL 5 MG/1
5 TABLET ORAL DAILY
Status: DISCONTINUED | OUTPATIENT
Start: 2023-02-09 | End: 2023-02-08 | Stop reason: HOSPADM

## 2023-02-08 RX ADMIN — DILTIAZEM HYDROCHLORIDE 240 MG: 240 CAPSULE, COATED, EXTENDED RELEASE ORAL at 08:11

## 2023-02-08 RX ADMIN — ASPIRIN 81 MG: 81 TABLET, COATED ORAL at 08:11

## 2023-02-08 RX ADMIN — LISINOPRIL 2.5 MG: 2.5 TABLET ORAL at 08:12

## 2023-02-08 RX ADMIN — LISINOPRIL 2.5 MG: 2.5 TABLET ORAL at 12:29

## 2023-02-08 RX ADMIN — HUMAN ALBUMIN MICROSPHERES AND PERFLUTREN 9 ML: 10; .22 INJECTION, SOLUTION INTRAVENOUS at 08:51

## 2023-02-08 ASSESSMENT — ACTIVITIES OF DAILY LIVING (ADL)
ADLS_ACUITY_SCORE: 20

## 2023-02-08 NOTE — PLAN OF CARE
Goal Outcome Evaluation:        [id number]    A&O x 4. Pt reported mild headache, that has been present since 4pm- declined tylenol. Ice pack placed- pt reported some relief. Neuros intact. Groin sites CDI, CMS intact. VSS, on RA.  Tele SR w/BBB. Plan for ECHO AM/Cardiac rehab. Continue with plan of care.

## 2023-02-08 NOTE — PROGRESS NOTES
02/08/23 0700   Appointment Info   Signing Clinician's Name / Credentials (PT) Marguerite Romero, PT   Living Environment   People in Home spouse   Current Living Arrangements house   Home Accessibility stairs to enter home   Number of Stairs, Main Entrance 2   Stair Railings, Main Entrance none   Transportation Anticipated car, drives self   Living Environment Comments Pt has basement that he rarely frequents   Self-Care   Usual Activity Tolerance good   Current Activity Tolerance good   Regular Exercise No   Equipment Currently Used at Home none   Fall history within last six months no   General Information   Onset of Illness/Injury or Date of Surgery 02/07/23   Referring Physician Dr. Jang   Patient/Family Therapy Goals Statement (PT) To go home   Pertinent History of Current Problem (include personal factors and/or comorbidities that impact the POC) Pt is an 82 year male s/p TAVR   Existing Precautions/Restrictions cardiac   Cognition   Affect/Mental Status (Cognition) WFL   Orientation Status (Cognition) oriented x 4   Pain Assessment   Patient Currently in Pain No   Range of Motion (ROM)   Range of Motion ROM is WFL   Strength (Manual Muscle Testing)   Strength (Manual Muscle Testing) strength is WFL   Bed Mobility   Bed Mobility no deficits identified   Transfers   Transfers no deficits identified   Gait/Stairs (Locomotion)   [place] Level (Gait) independent   Balance   Balance Comments Good   Clinical Impression   Criteria for Skilled Therapeutic Intervention Yes, treatment indicated   PT Diagnosis (PT) Impaired endurance   Influenced by the following impairments Decreased endurance   Functional limitations due to impairments Difficulty with long distance ambulation   Clinical Presentation (PT Evaluation Complexity) Stable/Uncomplicated   Clinical Presentation Rationale VSS, pain controlled   Clinical Decision Making (Complexity) low complexity   Planned Therapy Interventions (PT) patient/family  education;progressive activity/exercise;risk factor education;home program guidelines   Risk & Benefits of therapy have been explained evaluation/treatment results reviewed;care plan/treatment goals reviewed;risks/benefits reviewed;current/potential barriers reviewed;participants voiced agreement with care plan;participants included;patient   PT Total Evaluation Time   PT Eval, Low Complexity Minutes (39446) 10   Plan of Care Review   Plan of Care Reviewed With patient   Physical Therapy Goals   PT Frequency 2x/day   PT Predicted Duration/Target Date for Goal Attainment 02/09/23   PT Goals Cardiac Phase 1   PT: Understanding of cardiac education to maximize quality of life, condition management, and health outcomes Patient;Verbalize   PT: Perform aerobic activity with stable cardiovascular response continuous;ambulation;10 minutes   PT: Functional/aerobic ambulation tolerance with stable cardiovascular response in order to return to home and community environment Independent;Greater than 300 feet   PT: Navigation of stairs simulating home set up with stable cardiovascular response in order to return to home and community environment Modified independent;10 stairs;Rail on right   Interventions   Interventions Quick Adds Therapeutic Activity;Therapeutic Procedure;Cardiac Rehab   Therapeutic Procedure/Exercise   Ther. Procedure: strength, endurance, ROM, flexibillity Minutes (78214) 12   Symptoms Noted During/After Treatment none   Treatment Time Includes (CR Only) See specific exercise details intervention group(s);Monitoring of vital signs (see vital signs flowsheet for details);Extra time changing tele monitor   Therapeutic Activity   Therapeutic Activities: dynamic activities to improve functional performance Minutes (30706) 10   Symptoms Noted During/After Treatment None   Treatment Detail/Skilled Intervention Pt received supine in bed, agreeable to therapy. Pt educated on PT/CR POC, role of CR in acute care  setting, therapy goals and discharge recommendations. Pt independent with bed mobility, transfers, ambulation. Pt provided with cardiac TAVR education, including signs/symptoms of activity intolerance, exercise guidelines, OMNI effort scale, precautions/restrictions , and outpatient CR.   Ambulation   Workload Fair paced ambulation in hallway without AD   Duration (minutes) 8 minutes   Effort Scale 4   Symptoms Denies symptoms   Cardiovascular Response Hypertension at rest  (RN aware)   Exercise Details >500' without rest break   Vital Signs Details See VSFS   Stairs   Workload Up/Down steps with handrail   Duration (minutes) 2 minutes   Effort Scale 4   Symptoms Denies symptoms   Cardiovascular Response Hypertension at rest   Cardiac Education   Education Provided Energy conservation;Heart anatomy;Home exercise program;OMNI Scale;Outpatient Cardiac Rehab;Precautions;Resuming home activities;Signs and symptoms;Diagnosis   Education Packet Given to Patient Yes   Cardiac Rehab Phase II Plan   Phase II Order Received Yes   PT Discharge Planning   PT Plan Progress activity, increase ambulation distance, stairs, review education   PT Discharge Recommendation (DC Rec) home with outpatient cardiac rehab   PT Rationale for DC Rec Pt is independent with mobility and safe to discharge home. Pt will benefit from outpatient CR to further increase activity tolerance and for cardiac education.   PT Brief overview of current status Independent   Total Session Time   Timed Code Treatment Minutes 22   Total Session Time (sum of timed and untimed services) 32

## 2023-02-08 NOTE — PLAN OF CARE
Goal Outcome Evaluation:      Plan of Care Reviewed With: patient      Discharge home with wife, up in the room independent, tolerated food, denies pain, VSS. RA. Groin sites dry and intact no bleeding no hematoma no bruit.

## 2023-02-08 NOTE — DISCHARGE INSTRUCTIONS
TAVR Discharge Instructions  You have just had your aortic valve replaced with a new biological tissue valve. You should feel better after your surgery, but complete recovery may take several weeks. Please follow these instructions carefully and please call your valve coordinator with any questions or concerns.      CONTACT INFORMATION:   [facility]:  [phone] (7 days a week)  Scheduling and general questions - Sona ([phone])  Valve Coordinators - Maryse RN ([phone]), Radha RN ([phone]), and Michelle RN ([phone])    AFTER YOU GO HOME:  Drink extra fluids for 2 days.  You may resume your normal diet.  Do not smoke.  Do not drink alcohol.  Relax and take it easy.  Do not make any important or legal decisions.    FOR 1 WEEK AFTER TAVR:  Do not drive or operate machines at home or at work. No driving until you return for your 1 week follow-up appointment. You and the provider will discuss this at the appointment.   Refrain from sexual intercourse for 1 week.  You may shower the day after your procedure. Do NOT take a bath, or use a hot tub or pool for at least 1 week. Do NOT scrub the site. Do not use lotion or powder near the puncture site.  Do not lift more than 10 pounds.   Do not do any heavy activity such as exercise, lifting, or straining.  No housework, yard work, or any activities that make you sweat.    CARE OF WRIST AND GROIN SITES:  For 2 days, when you cough, sneeze, laugh or move your bowels, hold your hand over the puncture site and press firmly on/above the site.  Remove the bandage after 24 hours. If there is minor oozing, apply another bandage and remove it after 12 hours.  It is normal to have bruising or pea size lump at the site.  If you have a wrist site puncture: Do not use your hand or arm to support your weight (such as rising from a chair) or bend your wrist (such as lifting a garage door) for 1 week.  No stooping or squatting for 1 week.      BLEEDING:  If you start bleeding from the site in your wrist:  Sit down and press firmly on/above the site with your fingers for 10 minutes.   Once bleeding stops, keep your arm still for 2 hours.   Call [facility] or valve coordinator as soon as you can.  If you start bleeding from the site in your groin:  Lie down flat and press firmly on/above the site for 10 minutes.  Once bleeding stops lay flat for 2 hours.   Call [facility] or valve coordinator as soon as you can.  Call 911 right away if you have heavy bleeding or bleeding that does not stop.    MEDICINES:  You may be started on an anti-platelet medication, such as Plavix or aspirin. Please call the [facility] with any questions regarding this medication.  If you have stopped any medicines, check with your provider about when to restart them.  You will require lifetime prophylactic antibiotics for dental cleanings and dental work after your TAVR, please inquire with the Heart Clinic prior to your scheduled cleanings.     FOLLOW-UP APPOINTMENTS:  Follow-up with a Structural Heart advanced practice provider (NP or PA) at [facility] in 7-10 days after your procedure.  You will also follow-up at [facility] 1 month after your procedure which will include a visit with an advanced practice provider an echocardiogram (echo), an electrocardiogram (ECG), and lab work. This follow-up should be scheduled for you prior to you leaving the hospital.  Cardiac Rehab will contact you for follow up care. You can enroll at a site convenient to you.  We will have you see your primary cardiologist about 6 months after your TAVR.  We will see you 1 year after your TAVR with a visit with an advanced practice provider (NP or PA) an echocardiogram (echo), electrocardiogram (ECG), and lab work.     CALL THE CLINIC IF:   You have increased pain or a large or growing hard lump  around the site.  The site is red, swollen, hot, or tender.  Blood or fluid is draining from the site.  You have chills or a fever greater than 101 F (38 C).  Your arm or leg feels numb, cool, or changes color.  You have hives, a rash, or unusual itching.  New pain in the back or belly that you cannot control with Tylenol.  Any questions or concerns.    OTHER INSTRUCTIONS:  You will receive a card with your new valve information in the mail, directly from the . Retain this card with your health care records.    DENTAL WORK:  You must have antibiotics before all dental work to prevent endocarditis, or an infection on your new heart valve. Please call the valve coordinators to get a prescription for this. Please do not schedule any dental cleanings for at least 3-6 months after your TAVR.

## 2023-02-08 NOTE — PLAN OF CARE
Physical Therapy Discharge Summary    Reason for therapy discharge:    Discharged to home with outpatient therapy.    Progress towards therapy goal(s). See goals on Care Plan in [facility] electronic health record for goal details.  Goals partially met.  Barriers to achieving goals:   discharge on same date as initial evaluation.    Therapy recommendation(s):    Continued therapy is recommended.  Rationale/Recommendations:  For further cardiac education and endurance training.

## 2023-02-08 NOTE — PROGRESS NOTES
"[facility]    Medicine Progress Note - Hospitalist Service    Date of Admission:  2/7/2023    Assessment & Plan   Reynaldo Monteiro is a 82 year old male admitted on 2/7/2023. He has a past medical history of severe aortic stenosis, chronic diastolic heart failure, dyslipidemia, hypertension, chronic RBBB with transient CHB (resolved), diabetes mellitus, type II who is admitted for elective TAVR.    Severe aortic stenosis s/p TAVR, 2/7/2023  Procedure completed by Dr. Jang, with 29 mm Villanueva FRIDA 3 valve.  It is noted the patient had transient complete heart block postprocedure which has resolved completely.    Postprocedure care provided by cardiology, please see their H&P for full details    Postprocedural survey Echo showed, \"Valve is well seated. No prosthetic or vandana-prosthetic regurgitation. Mean systolic gradient 5 mmHg. No pericardial effusion.\"    Neurochecks per protocol    Diabetes mellitus, type II, well controlled  [PTA Jardiance]  Most recent hemoglobin A1c 6.1% per patient (labs unavailable to writer)    Primary service has entered QID glucose monitoring with medium sliding scale coverage    Blood sugar readings are at goal    Jardiance on hold, resume at discharge     Dyslipidemia    Continue statin    Hypertension    Continue PTA diltiazem  mg daily (per cards)    Hold metoprolol XL in light of recent CHB    Chronic diastolic heart failure  Not currently on any diuretic therapy    Management per cardiology service        # Thrombocytopenia: Lowest platelets = 120 in last 2 days, will monitor for bleeding         # Overweight: Estimated body mass index is 26.5 kg/m  as calculated from the following:    Height as of this encounter: 1.803 m (5' 11\").    Weight as of this encounter: 86.2 kg (190 lb)., PRESENT ON ADMISSION     Diet: 2 Gram Sodium Diet    DVT Prophylaxis: Low Risk/Ambulatory with no VTE prophylaxis indicated  Gilliam Catheter: Not present  Lines: None   " "  Cardiac Monitoring: ACTIVE order. Indication: Transcatheter structural interventions (24 hours)  Code Status: Full Code      Clinically Significant Risk Factors                    Disposition Plan   Expected Discharge Date: 02/08/2023   No medical objections to discharge today.   Destination: home            Ashley Brown MD  Hospitalist Service  [facility]  Securely message with [employer] (more info)  Text page via [employer] Paging/Directory   ______________________________________________________________________    Interval History   \"I do not think it could have gone much better.\"  Patient says he feels well, he is walked around the unit, went up some steps.  He denies chest pain or shortness of breath, no GI complaints.    Physical Exam   Vital Signs: Temp: 98.7  F (37.1  C) Temp src: Oral BP: (!) 134/99 Pulse: 84   Resp: 13 SpO2: 97 % O2 Device: None (Room air)    Weight: 190 lbs 0 oz    Constitutional: Awake, alert, cooperative  Respiratory: Clear to auscultation bilaterally, no crackles or wheezing  Cardiovascular: Regular rate and rhythm  GI: Normal bowel sounds, soft, non-distended, non-tender  Skin/Integumen: No rash on exposed skin.  No lower extremity edema  Other: Mood is pleasant      Medical Decision Making       35 MINUTES SPENT BY ME on the date of service doing chart review, history, exam, documentation & further activities per the note.      Data     I have personally reviewed the following data over the past 24 hrs:    10.8  \   15.2   / 120 (L)     136 99 13.6 /  142 (H)   4.0 27 0.73 \       Imaging results reviewed over the past 24 hrs:   Recent Results (from the past 24 hour(s))   Cardiac Catheterization    Narrative      Successful TAVR with Villanueva 29 Ultra valve. Post gradient 5 mm Hg.    No paravalvular leak.      Echocardiogram Complete   Result Value    LVEF  60-65%    Narrative    [id number]  [id number]  [id number]  [id number]^JOSÉ MIGUEL^J CARLOS^AHMAD     [facility] " [facility]  Echocardiography Laboratory  [address] [place] [address], [place] [address]     Name: BUTCH MANUEL  MRN: [id number]  : 1940  Study Date: 2023 07:07 AM  Age: 82 yrs  Gender: Male  Patient Location: [facility]  Reason For Study: Severe aortic stenosis by prior echocardiogram  Ordering Physician: J CARLOS VALDEZ  Referring Physician: Mick Torres  Performed By: Argenis Christian     BSA: 2.1 m2  Height: 71 in  Weight: 190 lb  BP: 150/95 mmHg  ______________________________________________________________________________  Procedure  Complete Portable Echo Adult.  ______________________________________________________________________________  Interpretation Summary     PROCEDURE  Intra-procedural transthoracic echocardiogram for transfemoral transcatheter  aortic valve replacement with Villanueva 29 Ultra valve.     PRE-PROCEDURAL FINDINGS:  1. Severe calcific aortic valve stenosis with trace aortic regurgitation.  2. Normal left ventricular systolic function. Visually estimated LVEF 60-65%.     POST-PROCEDURAL SURVEY:  1. The valve was successfully deployed.  2. Valve is well seated. No prosthetic or vandana-prosthetic regurgitation. Mean  systolic gradient 5 mmHg.  3. No pericardial effusion.     ______________________________________________________________________________  Left Ventricle  The left ventricle is normal in size. There is normal left ventricular wall  thickness. Left ventricular systolic function is normal. The visual ejection  fraction is 60-65%. Septal motion is consistent with conduction abnormality.     Right Ventricle  The right ventricle is normal in size and function.     Atria  The left atrium is moderately dilated. Right atrial size is normal.     Mitral Valve  The mitral valve leaflets are mildly thickened. There is moderate mitral  annular calcification. There is trace mitral regurgitation. There is no mitral  valve stenosis. The mean mitral valve gradient is 2mmHg. Heart rate  70 BPM.     Tricuspid Valve  Normal tricuspid valve. There is trace tricuspid regurgitation. Right  ventricular systolic pressure could not be approximated due to inadequate  tricuspid regurgitation.     Aortic Valve  The aortic valve is trileaflet. There is trace to mild aortic regurgitation.  Severe valvular aortic stenosis.     Pulmonic Valve  The pulmonic valve is not well visualized.     Vessels  The aortic root is normal size. The inferior vena cava is normal.     Pericardium  There is no pericardial effusion.     Rhythm  Sinus rhythm was noted.  ______________________________________________________________________________  Doppler Measurements & Calculations  Ao V2 max: 173.7 cm/sec  Ao max P.0 mmHg  Ao V2 mean: 109.5 cm/sec  Ao mean P.5 mmHg  Ao V2 VTI: 33.6 cm     ______________________________________________________________________________  Report approved by: Dr Kulwinder Pitts 2023 10:49 AM

## 2023-02-09 LAB
ATRIAL RATE - MUSE: 64 BPM
DIASTOLIC BLOOD PRESSURE - MUSE: NORMAL MMHG
INTERPRETATION ECG - MUSE: NORMAL
P AXIS - MUSE: 54 DEGREES
PR INTERVAL - MUSE: 180 MS
QRS DURATION - MUSE: 152 MS
QT - MUSE: 450 MS
QTC - MUSE: 464 MS
R AXIS - MUSE: -10 DEGREES
SYSTOLIC BLOOD PRESSURE - MUSE: NORMAL MMHG
T AXIS - MUSE: 33 DEGREES
VENTRICULAR RATE- MUSE: 64 BPM

## 2023-02-10 ENCOUNTER — PATIENT OUTREACH (OUTPATIENT)
Dept: CARE COORDINATION | Facility: CLINIC | Age: 83
End: 2023-02-10
Payer: MEDICARE

## 2023-02-10 LAB
ATRIAL RATE - MUSE: 84 BPM
DIASTOLIC BLOOD PRESSURE - MUSE: NORMAL MMHG
INTERPRETATION ECG - MUSE: NORMAL
P AXIS - MUSE: 44 DEGREES
PR INTERVAL - MUSE: 192 MS
QRS DURATION - MUSE: 142 MS
QT - MUSE: 414 MS
QTC - MUSE: 489 MS
R AXIS - MUSE: 10 DEGREES
SYSTOLIC BLOOD PRESSURE - MUSE: NORMAL MMHG
T AXIS - MUSE: 0 DEGREES
VENTRICULAR RATE- MUSE: 84 BPM

## 2023-02-10 NOTE — PROGRESS NOTES
Clinic Care Coordination Contact  [facility]/Voicemail       Clinical Data: Care Coordinator Outreach  Outreach attempted x 2.  Left message on patient's voicemail with call back information and requested return call.  Plan:  Care Coordinator will do no further outreaches at this time.    Kisha PICKARD Community Health Worker  Clinic Care Coordination  [facility]  Phone: [phone]

## 2023-02-11 ENCOUNTER — HOSPITAL ENCOUNTER (INPATIENT)
Facility: CLINIC | Age: 83
LOS: 3 days | Discharge: HOME | DRG: 243 | End: 2023-02-14
Attending: EMERGENCY MEDICINE | Admitting: HOSPITALIST
Payer: MEDICARE

## 2023-02-11 ENCOUNTER — APPOINTMENT (OUTPATIENT)
Dept: GENERAL RADIOLOGY | Facility: CLINIC | Age: 83
DRG: 243 | End: 2023-02-11
Attending: EMERGENCY MEDICINE
Payer: MEDICARE

## 2023-02-11 ENCOUNTER — TELEPHONE (OUTPATIENT)
Dept: CARDIOLOGY | Facility: CLINIC | Age: 83
End: 2023-02-11
Payer: MEDICARE

## 2023-02-11 ENCOUNTER — APPOINTMENT (OUTPATIENT)
Dept: CARDIOLOGY | Facility: CLINIC | Age: 83
DRG: 243 | End: 2023-02-11
Attending: EMERGENCY MEDICINE
Payer: MEDICARE

## 2023-02-11 DIAGNOSIS — R00.1 SYMPTOMATIC BRADYCARDIA: ICD-10-CM

## 2023-02-11 DIAGNOSIS — I45.9 ACQUIRED HEART BLOCK: ICD-10-CM

## 2023-02-11 DIAGNOSIS — I10 BENIGN ESSENTIAL HYPERTENSION: Primary | ICD-10-CM

## 2023-02-11 DIAGNOSIS — Z95.2 HISTORY OF TRANSCATHETER AORTIC VALVE REPLACEMENT (TAVR): ICD-10-CM

## 2023-02-11 DIAGNOSIS — R55 PRE-SYNCOPE: ICD-10-CM

## 2023-02-11 LAB
ANION GAP SERPL CALCULATED.3IONS-SCNC: 13 MMOL/L (ref 7–15)
ATRIAL RATE - MUSE: 96 BPM
BASOPHILS # BLD AUTO: 0 10E3/UL (ref 0–0.2)
BASOPHILS NFR BLD AUTO: 0 %
BUN SERPL-MCNC: 17.9 MG/DL (ref 8–23)
CALCIUM SERPL-MCNC: 9.1 MG/DL (ref 8.8–10.2)
CHLORIDE SERPL-SCNC: 97 MMOL/L (ref 98–107)
CREAT SERPL-MCNC: 0.88 MG/DL (ref 0.67–1.17)
D DIMER PPP FEU-MCNC: 0.47 UG/ML FEU (ref 0–0.5)
DEPRECATED HCO3 PLAS-SCNC: 27 MMOL/L (ref 22–29)
DIASTOLIC BLOOD PRESSURE - MUSE: NORMAL MMHG
EOSINOPHIL # BLD AUTO: 0.1 10E3/UL (ref 0–0.7)
EOSINOPHIL NFR BLD AUTO: 2 %
ERYTHROCYTE [DISTWIDTH] IN BLOOD BY AUTOMATED COUNT: 13.5 % (ref 10–15)
GFR SERPL CREATININE-BSD FRML MDRD: 87 ML/MIN/1.73M2
GLUCOSE BLDC GLUCOMTR-MCNC: 106 MG/DL (ref 70–99)
GLUCOSE BLDC GLUCOMTR-MCNC: 120 MG/DL (ref 70–99)
GLUCOSE SERPL-MCNC: 153 MG/DL (ref 70–99)
HBA1C MFR BLD: 6.4 %
HCT VFR BLD AUTO: 47 % (ref 40–53)
HGB BLD-MCNC: 15.6 G/DL (ref 13.3–17.7)
IMM GRANULOCYTES # BLD: 0 10E3/UL
IMM GRANULOCYTES NFR BLD: 0 %
INTERPRETATION ECG - MUSE: NORMAL
LVEF ECHO: NORMAL
LYMPHOCYTES # BLD AUTO: 2.3 10E3/UL (ref 0.8–5.3)
LYMPHOCYTES NFR BLD AUTO: 29 %
MCH RBC QN AUTO: 29.5 PG (ref 26.5–33)
MCHC RBC AUTO-ENTMCNC: 33.2 G/DL (ref 31.5–36.5)
MCV RBC AUTO: 89 FL (ref 78–100)
MONOCYTES # BLD AUTO: 0.7 10E3/UL (ref 0–1.3)
MONOCYTES NFR BLD AUTO: 9 %
NEUTROPHILS # BLD AUTO: 4.7 10E3/UL (ref 1.6–8.3)
NEUTROPHILS NFR BLD AUTO: 60 %
NRBC # BLD AUTO: 0 10E3/UL
NRBC BLD AUTO-RTO: 0 /100
P AXIS - MUSE: NORMAL DEGREES
PLATELET # BLD AUTO: 120 10E3/UL (ref 150–450)
POTASSIUM SERPL-SCNC: 4.2 MMOL/L (ref 3.4–5.3)
PR INTERVAL - MUSE: NORMAL MS
QRS DURATION - MUSE: 148 MS
QT - MUSE: 424 MS
QTC - MUSE: 467 MS
R AXIS - MUSE: -36 DEGREES
RBC # BLD AUTO: 5.28 10E6/UL (ref 4.4–5.9)
SODIUM SERPL-SCNC: 137 MMOL/L (ref 136–145)
SYSTOLIC BLOOD PRESSURE - MUSE: NORMAL MMHG
T AXIS - MUSE: 9 DEGREES
TROPONIN T SERPL HS-MCNC: 18 NG/L
TSH SERPL DL<=0.005 MIU/L-ACNC: 1.39 UIU/ML (ref 0.3–4.2)
VENTRICULAR RATE- MUSE: 73 BPM
WBC # BLD AUTO: 7.8 10E3/UL (ref 4–11)

## 2023-02-11 PROCEDURE — 93005 ELECTROCARDIOGRAM TRACING: CPT

## 2023-02-11 PROCEDURE — 36415 COLL VENOUS BLD VENIPUNCTURE: CPT | Performed by: EMERGENCY MEDICINE

## 2023-02-11 PROCEDURE — 83036 HEMOGLOBIN GLYCOSYLATED A1C: CPT | Performed by: HOSPITALIST

## 2023-02-11 PROCEDURE — 99285 EMERGENCY DEPT VISIT HI MDM: CPT | Mod: 25

## 2023-02-11 PROCEDURE — 85025 COMPLETE CBC W/AUTO DIFF WBC: CPT | Performed by: EMERGENCY MEDICINE

## 2023-02-11 PROCEDURE — 258N000003 HC RX IP 258 OP 636: Performed by: INTERNAL MEDICINE

## 2023-02-11 PROCEDURE — 250N000013 HC RX MED GY IP 250 OP 250 PS 637: Performed by: INTERNAL MEDICINE

## 2023-02-11 PROCEDURE — 96360 HYDRATION IV INFUSION INIT: CPT

## 2023-02-11 PROCEDURE — 84484 ASSAY OF TROPONIN QUANT: CPT | Performed by: EMERGENCY MEDICINE

## 2023-02-11 PROCEDURE — 85379 FIBRIN DEGRADATION QUANT: CPT | Performed by: EMERGENCY MEDICINE

## 2023-02-11 PROCEDURE — 210N000001 HC R&B IMCU HEART CARE

## 2023-02-11 PROCEDURE — 255N000002 HC RX 255 OP 636: Performed by: EMERGENCY MEDICINE

## 2023-02-11 PROCEDURE — 80048 BASIC METABOLIC PNL TOTAL CA: CPT | Performed by: EMERGENCY MEDICINE

## 2023-02-11 PROCEDURE — 93325 DOPPLER ECHO COLOR FLOW MAPG: CPT | Mod: 26 | Performed by: INTERNAL MEDICINE

## 2023-02-11 PROCEDURE — 250N000013 HC RX MED GY IP 250 OP 250 PS 637: Performed by: HOSPITALIST

## 2023-02-11 PROCEDURE — 93325 DOPPLER ECHO COLOR FLOW MAPG: CPT

## 2023-02-11 PROCEDURE — 84443 ASSAY THYROID STIM HORMONE: CPT | Performed by: EMERGENCY MEDICINE

## 2023-02-11 PROCEDURE — 82962 GLUCOSE BLOOD TEST: CPT

## 2023-02-11 PROCEDURE — 96361 HYDRATE IV INFUSION ADD-ON: CPT

## 2023-02-11 PROCEDURE — 93308 TTE F-UP OR LMTD: CPT | Mod: 26 | Performed by: INTERNAL MEDICINE

## 2023-02-11 PROCEDURE — 99223 1ST HOSP IP/OBS HIGH 75: CPT | Mod: AI | Performed by: HOSPITALIST

## 2023-02-11 PROCEDURE — 93321 DOPPLER ECHO F-UP/LMTD STD: CPT

## 2023-02-11 PROCEDURE — 71046 X-RAY EXAM CHEST 2 VIEWS: CPT

## 2023-02-11 PROCEDURE — 93321 DOPPLER ECHO F-UP/LMTD STD: CPT | Mod: 26 | Performed by: INTERNAL MEDICINE

## 2023-02-11 PROCEDURE — 99223 1ST HOSP IP/OBS HIGH 75: CPT | Performed by: INTERNAL MEDICINE

## 2023-02-11 RX ORDER — DEXTROSE MONOHYDRATE 25 G/50ML
25-50 INJECTION, SOLUTION INTRAVENOUS
Status: DISCONTINUED | OUTPATIENT
Start: 2023-02-11 | End: 2023-02-14 | Stop reason: HOSPADM

## 2023-02-11 RX ORDER — ASPIRIN 81 MG/1
81 TABLET ORAL DAILY
Status: DISCONTINUED | OUTPATIENT
Start: 2023-02-11 | End: 2023-02-14 | Stop reason: HOSPADM

## 2023-02-11 RX ORDER — SODIUM CHLORIDE 9 MG/ML
INJECTION, SOLUTION INTRAVENOUS CONTINUOUS
Status: DISCONTINUED | OUTPATIENT
Start: 2023-02-11 | End: 2023-02-11

## 2023-02-11 RX ORDER — NICOTINE POLACRILEX 4 MG
15-30 LOZENGE BUCCAL
Status: DISCONTINUED | OUTPATIENT
Start: 2023-02-11 | End: 2023-02-14 | Stop reason: HOSPADM

## 2023-02-11 RX ORDER — ACETAMINOPHEN 650 MG/1
650 SUPPOSITORY RECTAL EVERY 6 HOURS PRN
Status: DISCONTINUED | OUTPATIENT
Start: 2023-02-11 | End: 2023-02-14 | Stop reason: HOSPADM

## 2023-02-11 RX ORDER — ONDANSETRON 4 MG/1
4 TABLET, ORALLY DISINTEGRATING ORAL EVERY 6 HOURS PRN
Status: DISCONTINUED | OUTPATIENT
Start: 2023-02-11 | End: 2023-02-14 | Stop reason: HOSPADM

## 2023-02-11 RX ORDER — LISINOPRIL 5 MG/1
5 TABLET ORAL DAILY
Status: DISCONTINUED | OUTPATIENT
Start: 2023-02-11 | End: 2023-02-11

## 2023-02-11 RX ORDER — ONDANSETRON 2 MG/ML
4 INJECTION INTRAMUSCULAR; INTRAVENOUS EVERY 6 HOURS PRN
Status: DISCONTINUED | OUTPATIENT
Start: 2023-02-11 | End: 2023-02-14 | Stop reason: HOSPADM

## 2023-02-11 RX ORDER — ROSUVASTATIN CALCIUM 10 MG/1
10 TABLET, COATED ORAL DAILY
Status: DISCONTINUED | OUTPATIENT
Start: 2023-02-11 | End: 2023-02-14 | Stop reason: HOSPADM

## 2023-02-11 RX ORDER — AMOXICILLIN 250 MG
1 CAPSULE ORAL 2 TIMES DAILY PRN
Status: DISCONTINUED | OUTPATIENT
Start: 2023-02-11 | End: 2023-02-14 | Stop reason: HOSPADM

## 2023-02-11 RX ORDER — AMOXICILLIN 250 MG
2 CAPSULE ORAL 2 TIMES DAILY PRN
Status: DISCONTINUED | OUTPATIENT
Start: 2023-02-11 | End: 2023-02-14 | Stop reason: HOSPADM

## 2023-02-11 RX ORDER — DOXAZOSIN 2 MG/1
2 TABLET ORAL AT BEDTIME
Status: DISCONTINUED | OUTPATIENT
Start: 2023-02-11 | End: 2023-02-14 | Stop reason: HOSPADM

## 2023-02-11 RX ORDER — ACETAMINOPHEN 325 MG/1
650 TABLET ORAL EVERY 6 HOURS PRN
Status: DISCONTINUED | OUTPATIENT
Start: 2023-02-11 | End: 2023-02-14 | Stop reason: HOSPADM

## 2023-02-11 RX ORDER — LIDOCAINE 40 MG/G
CREAM TOPICAL
Status: DISCONTINUED | OUTPATIENT
Start: 2023-02-11 | End: 2023-02-14 | Stop reason: HOSPADM

## 2023-02-11 RX ORDER — LISINOPRIL 10 MG/1
10 TABLET ORAL DAILY
Status: DISCONTINUED | OUTPATIENT
Start: 2023-02-11 | End: 2023-02-11

## 2023-02-11 RX ORDER — LISINOPRIL 10 MG/1
10 TABLET ORAL 2 TIMES DAILY
Status: DISCONTINUED | OUTPATIENT
Start: 2023-02-11 | End: 2023-02-14 | Stop reason: HOSPADM

## 2023-02-11 RX ADMIN — EMPAGLIFLOZIN 10 MG: 10 TABLET, FILM COATED ORAL at 18:21

## 2023-02-11 RX ADMIN — HUMAN ALBUMIN MICROSPHERES AND PERFLUTREN 9 ML: 10; .22 INJECTION, SOLUTION INTRAVENOUS at 10:41

## 2023-02-11 RX ADMIN — SODIUM CHLORIDE 500 ML: 9 INJECTION, SOLUTION INTRAVENOUS at 13:52

## 2023-02-11 RX ADMIN — ROSUVASTATIN CALCIUM 10 MG: 10 TABLET, FILM COATED ORAL at 17:30

## 2023-02-11 RX ADMIN — LISINOPRIL 10 MG: 10 TABLET ORAL at 13:51

## 2023-02-11 RX ADMIN — DOXAZOSIN MESYLATE 2 MG: 2 TABLET ORAL at 20:35

## 2023-02-11 RX ADMIN — LISINOPRIL 10 MG: 10 TABLET ORAL at 20:36

## 2023-02-11 RX ADMIN — ASPIRIN 81 MG: 81 TABLET, COATED ORAL at 17:30

## 2023-02-11 RX ADMIN — SODIUM CHLORIDE 500 ML: 9 INJECTION, SOLUTION INTRAVENOUS at 10:36

## 2023-02-11 ASSESSMENT — ENCOUNTER SYMPTOMS
ABDOMINAL PAIN: 0
LIGHT-HEADEDNESS: 1
SHORTNESS OF BREATH: 0
APPETITE CHANGE: 0
PALPITATIONS: 1

## 2023-02-11 ASSESSMENT — ACTIVITIES OF DAILY LIVING (ADL)
ADLS_ACUITY_SCORE: 35

## 2023-02-11 NOTE — ED NOTES
"[facility]  ED Nurse Handoff Report    ED Chief complaint: Syncope      ED Diagnosis:   Final diagnoses:   None       Code Status: Full Code    Allergies: No Known Allergies    Patient Story: Reynaldo Monteiro is a 82 year old male with a history of non-insulin diabetes mellitus, hypertension, hyperlipidemia, and severe aortic valve stenosis s/p TAVR on 2/8/23, who presents with feelings of near-syncope (no actual loss of consciousness) beginning last night and worsening this morning. The patient was discharged following TAVR 3 days ago with recorder on chest for regular cardiac monitoring. Last night, the patient experienced new mild chest pain (exacerbated with mild exertion) and palpitations with feelings of near syncope. With onset of this, nursing staff called him last night to confirm abnormal readings on the monitor and advised he schedule an appointment with cardiology on Monday, but to be seen in ED sooner in the event of persisting symptoms. Today, the patient did awake with ongoing discomfort and persisting lightheadedness, prompting concern and his presentation to ED at this time. En route per patient, he was \"in an out\" of consciousness but had no true syncope. No chest pain at this time. Denies shortness of breath, abdominal pain, or leg swelling. Patient has had good oral intake.     Focused Assessment:  Chest pain    Treatments and/or interventions provided: iv, meds, labs, imaging, echo   Patient's response to treatments and/or interventions: tolerated     To be done/followed up on inpatient unit:  na     Does this patient have any cognitive concerns?: none    Activity level - Baseline/Home:  Independent  Activity Level - Current:   Independent    Patient's Preferred language: English   Needed?: No    Isolation: None  Infection: Not Applicable  Patient tested for COVID 19 prior to admission: YES  Bariatric?: No    Vital Signs:   Vitals:    02/11/23 0909 02/11/23 1026 " 02/11/23 1041 02/11/23 1056   BP:  (!) 162/58 (!) 143/61 (!) 170/122   Pulse: 57 50 50 50   Resp: 14 25 10 28   Temp:       TempSrc:       SpO2: 95%   97%   Weight:       Height:           Cardiac Rhythm:     Was the PSS-3 completed:   Yes  What interventions are required if any?               Family Comments:   OBS brochure/video discussed/provided to patient/family: N/A              Name of person given brochure if not patient:               Relationship to patient:     For the majority of the shift this patient's behavior was Green.   Behavioral interventions performed were .    ED NURSE PHONE NUMBER: [phone]

## 2023-02-11 NOTE — PHARMACY-ADMISSION MEDICATION HISTORY
Pharmacy Medication History  Admission medication history interview status for the 2/11/2023  admission is complete. See EPIC admission navigator for prior to admission medications     Location of Interview: Patient room  Medication history sources: Patient, Patient's family/friend (Wife at bedside) and Surescripts    Significant changes made to the medication list:  Added:  Zinc    In the past week, patient estimated taking medication this percent of the time: greater than 90%    Medication Affordability:  Not including over the counter (OTC) medications, was there a time in the past 12 months when you did not take your medications as prescribed because of cost? No    Medication reconciliation completed by provider prior to medication history? No    Time spent in this activity: 15 minutes    Prior to Admission medications    Medication Sig Last Dose Taking? Auth Provider Long Term End Date   aspirin (ASA) 81 MG EC tablet Take 1 tablet (81 mg) by mouth daily 2/10/2023 at am Yes Zeenat Fox, CNP No    diltiazem ER COATED BEADS (CARDIZEM CD/CARTIA XT) 240 MG 24 hr capsule Take 240 mg by mouth daily 2/10/2023 at am Yes Reported, Patient Yes    doxazosin (CARDURA) 4 MG tablet Take 2 mg by mouth At Bedtime (4MG X 0.5 = 2MG) 2/10/2023 at pm Yes Reported, Patient No    empagliflozin (JARDIANCE) 10 MG TABS tablet Take 10 mg by mouth daily 2/10/2023 at am Yes Reported, Patient     glucosamine 500 MG CAPS capsule Take 500 mg by mouth daily 2/10/2023 at am Yes Reported, Patient     lisinopril (ZESTRIL) 5 MG tablet Take 1 tablet (5 mg) by mouth daily 2/10/2023 at am Yes eZenat Fox CNP Yes    Multiple Vitamins-Minerals (ZINC PO) Take 1 tablet by mouth daily Patient unsure of dose 2/10/2023 at am Yes Unknown, Entered By History     polyethylene glycol (MIRALAX) 17 GM/Dose powder Take 1 capful by mouth daily as needed for constipation  at PRN Yes Reported, Patient     rosuvastatin (CRESTOR) 20 MG tablet Take 10 mg by  mouth daily (20MG X 0.5 = 10MG) 2/10/2023 at am Yes Reported, Patient No    vitamin C with B complex (B COMPLEX-C) tablet Take 1 tablet by mouth daily 2/10/2023 at am Yes Reported, Patient     vitamin D3 (CHOLECALCIFEROL) 50 mcg (2000 units) tablet Take 1 tablet by mouth daily 2/10/2023 at am Yes Reported, Patient     vitamin E 400 UNIT capsule Take 400 Units by mouth daily 2/10/2023 at am Yes Reported, Patient         The information provided in this note is only as accurate as the sources available at the time of update(s)   Wanda Wade  Pharmacy Intern

## 2023-02-11 NOTE — CONSULTS
[facility]    Cardiology Consultation     Reynaldo Monteiro MRN#: [id number]   YOB: 1940 Age: 82 year old     Date of Admission:  2/11/2023    Consult Indication:  Advanced heart block    Assessment & Plan     # Symptomatic transient advanced heart block (detected on outpatient cardiac monitor), currently asymptomatic and stable  # Status post TAVR with a 29 mm Villanueva FRIDA 3 valve (2/7/2023), transient complete heart block post deployment of valve  # HTN  # HL    - Fortunately patient is currently asymptomatic, no advanced heart block on telemetry since presentation  - Previously had been on diltiazem 240 mg daily, metoprolol succinate had been held following discharge from TAVR due to transient complete heart block post valve deployment, will hold both medications for now  - Will resume lisinopril, will increase dose to 10 mg BID given elevated blood pressures  - TTE pending  - Anticipate will need permanent pacemaker, discussed with interventional cardiology Dr. Alanis, appreciate assistance, no indication for temporary pacemaker at this time.  Recommend continued close monitoring, pacing pads in place.  EP consultation on Monday.  - Continue aspirin, rosuvastatin    Please do not hesitate to page with any questions or concerns.     Clark Smith MD, [facility]  Cardiology  February 11, 2023    Voice recognition software utilized.   High complexity     History of Present Illness     Patient is an 82-year-old male with a past medical history significant for hypertension, hyperlipidemia, diabetes, severe aortic stenosis status post TAVR with a 29 mm Villanueva FRIDA 3 valve (2/7/2023), transient complete heart block post deployment of valve, who presents with syncope and outpatient cardiac monitor demonstrating A-V dissociation.    Patient underwent TAVR with a 29 mm Villanueva FRIDA 3 bioprosthetic aortic valve for severe aortic stenosis on 2/7/2023 with   Dr. Suhail Saab, Dr. Loredo.  Post deployment was found to have transient complete heart block, has baseline right bundle branch block.  Complete heart block resolved.  TTE 2/8/2023 demonstrated LVEF 60 to 65%, mean gradient across the TAVR valve 15 mmHg.  He was discharged with a cardiac event monitor.  TAVR team was alerted that monitor had demonstrated A-V dissociation with heart rates in the 60s beats per minute range.  Per telephone note earlier this morning, patient was asymptomatic at that time.  Unfortunately, he developed intermittent dizziness/lightheadedness since then, and so presented to the ED as instructed.  ECG demonstrates sinus rhythm with Mobitz type 1 second-degree AV block at 73 bpm, right bundle branch block.  Labs including high-sensitivity troponin level unremarkable.  Heart rate remains in the 50s to 60s beats per minute range.  Currently asymptomatic.      Past Medical History   Past Medical History:   Diagnosis Date     Aortic valve stenosis      Esophagitis, reflux      Hyperlipidemia      Hypertension      Lumbar disc disorder with myelopathy     bilateral leg weakness     RBBB (right bundle branch block)      S/p TAVR (transcatheter aortic valve replacement), bioprosthetic 2/7/2023     Severe aortic valve stenosis      Type 2 diabetes mellitus (H)        Past Surgical History   Past Surgical History:   Procedure Laterality Date     ANGIOGRAM      Patient reports coronary angiogram at [place], [place] approx 10 years ago     CV CORONARY ANGIOGRAM N/A 11/29/2022    Procedure: Coronary Angiogram;  Surgeon: Edwin Meadows MD;  Location: [facility] CARDIAC CATH LAB     CV TRANSCATHETER AORTIC VALVE REPLACEMENT-FEMORAL APPROACH N/A 2/7/2023    Procedure: Transcatheter Aortic Valve Replacement-Femoral Approach;  Surgeon: Juan Jang MD;  Location: [facility] CARDIAC CATH LAB       Prior to Admission Medications   Prior to Admission Medications   Prescriptions Last Dose  Informant Patient Reported? Taking?   Multiple Vitamins-Minerals (ZINC PO) 2/10/2023 at am Spouse/Significant Other Yes Yes   Sig: Take 1 tablet by mouth daily Patient unsure of dose   aspirin (ASA) 81 MG EC tablet 2/10/2023 at am Self No Yes   Sig: Take 1 tablet (81 mg) by mouth daily   diltiazem ER COATED BEADS (CARDIZEM CD/CARTIA XT) 240 MG 24 hr capsule 2/10/2023 at am Self Yes Yes   Sig: Take 240 mg by mouth daily   doxazosin (CARDURA) 4 MG tablet 2/10/2023 at pm Self Yes Yes   Sig: Take 2 mg by mouth At Bedtime (4MG X 0.5 = 2MG)   empagliflozin (JARDIANCE) 10 MG TABS tablet 2/10/2023 at am Self Yes Yes   Sig: Take 10 mg by mouth daily   glucosamine 500 MG CAPS capsule 2/10/2023 at am Self Yes Yes   Sig: Take 500 mg by mouth daily   lisinopril (ZESTRIL) 5 MG tablet 2/10/2023 at am Self No Yes   Sig: Take 1 tablet (5 mg) by mouth daily   polyethylene glycol (MIRALAX) 17 GM/Dose powder  at PRN Self Yes Yes   Sig: Take 1 capful by mouth daily as needed for constipation   rosuvastatin (CRESTOR) 20 MG tablet 2/10/2023 at am Self Yes Yes   Sig: Take 10 mg by mouth daily (20MG X 0.5 = 10MG)   vitamin C with B complex (B COMPLEX-C) tablet 2/10/2023 at am Self Yes Yes   Sig: Take 1 tablet by mouth daily   vitamin D3 (CHOLECALCIFEROL) 50 mcg (2000 units) tablet 2/10/2023 at am Self Yes Yes   Sig: Take 1 tablet by mouth daily   vitamin E 400 UNIT capsule 2/10/2023 at am Self Yes Yes   Sig: Take 400 Units by mouth daily      Facility-Administered Medications: None         Allergies   No Known Allergies    Social History    reports that he has never smoked. He has never used smokeless tobacco. He reports current alcohol use of about 4.0 standard drinks per week. He reports that he does not use drugs.    Family History   I have reviewed this patient's family history and updated it with pertinent information if needed.  Family History   Problem Relation Age of Onset     Hypertension Mother         valve replacement       Coronary Artery Disease Father           Review of Systems   A comprehensive review of system was performed and is negative other than that noted in the HPI or here.     Physical Exam   Vital Signs with Ranges  Temp:  [97.5  F (36.4  C)] 97.5  F (36.4  C)  Pulse:  [50-60] 50  Resp:  [10-32] 28  BP: (143-170)/() 170/122  SpO2:  [95 %-98 %] 97 %  Wt Readings from Last 4 Encounters:   23 83.9 kg (185 lb)   23 86.2 kg (190 lb)   23 87.6 kg (193 lb 3.2 oz)   23 89.9 kg (198 lb 4.8 oz)     No intake/output data recorded.    Vital signs were personally reviewed:  Temperatures:  Current - Temp: 97.5  F (36.4  C); Max - Temp  Av.5  F (36.4  C)  Min: 97.5  F (36.4  C)  Max: 97.5  F (36.4  C)  Respiration range: Resp  Av.7  Min: 10  Max: 32  Pulse range: Pulse  Av.7  Min: 50  Max: 60  Blood pressure range: Systolic (24hrs), Av , Min:143 , Max:170   ; Diastolic (24hrs), Av, Min:53, Max:122    Pulse oximetry range: SpO2  Av.5 %  Min: 95 %  Max: 98 %  No intake or output data in the 24 hours ending 23 1252  185 lbs 0 oz  Body mass index is 25.8 kg/m .   Body surface area is 2.05 meters squared.    Physical Exam:   General/Constitutional: appears stated age, in no apparent distress, appears to be well nourished  Head: normocephalic, atraumatic  Eyes - No scleral icterus  Neck: supple, trachea midline  Respiratory: clear to auscultation bilaterally  Cardiovascular: JVP normal, regular rate, regular rhythm, normal S1 and S2, no S3, S4, 1/6 LORE at the RUSB, no lower extremity edema  Gastrointestinal: no guarding, non-rigid   Neurologic: awake, alert, moves all extremities  Skin: no jaundice, warm on limited exam  Psychiatric: affect is normal, answers questions appropriately, oriented to self and place    Laboratory tests personally reviewed:   CMP  Recent Labs   Lab 23  0818 23  1207 23  0555 23  0227 23  1006 23  0607     --   136  --   --   --    POTASSIUM 4.2  --  4.0  --   --  4.4   CHLORIDE 97*  --  99  --   --   --    CO2 27  --  27  --   --   --    ANIONGAP 13  --  10  --   --   --    * 141* 142* 142*   < > 151*   BUN 17.9  --  13.6  --   --   --    CR 0.88  --  0.73  --   --   --    GFRESTIMATED 87  --  >90  --   --   --    GIULIANO 9.1  --  9.2  --   --   --    MAG  --   --  2.0  --   --   --    PHOS  --   --  3.7  --   --   --     < > = values in this interval not displayed.     CBC  Recent Labs   Lab 23  0818 23  0555   WBC 7.8 10.8   RBC 5.28 5.18   HGB 15.6 15.2   HCT 47.0 46.5   MCV 89 90   MCH 29.5 29.3   MCHC 33.2 32.7   RDW 13.5 13.5   * 120*     INRNo lab results found in last 7 days.  Lab Results   Component Value Date    TROPI 0.031 10/21/2009    TROPI <0.012 10/20/2009    TROPI <0.012 10/20/2009     Recent Labs   Lab Test 10/04/22  1000 19  0000 18  0000   CHOL  --  96 98   HDL  --  39 41   LDL  --  37 40   TRIG 132 112 86     No results found for: A1C  TSH   Date Value Ref Range Status   2023 1.39 0.30 - 4.20 uIU/mL Final       Imaging:  Recent Results (from the past 4320 hour(s))   Echo Limited   Result Value    LVEF  60-65%    [facility]    [id number]  [id number]  [id number]  [id number]^AIMEE^CHELLE^SYLVIA     [facility]  Echocardiography Laboratory  [address] [place] [id number]     Name: BUTCH MANUEL  MRN: [id number]  : 1940  Study Date: 2023 08:33 AM  Age: 82 yrs  Gender: Male  Patient Location: [facility]  Reason For Study: Aortic Valve Replacement  Ordering Physician: STENACH, CHELLE J  Referring Physician: Mick Torres  Performed By: Maryse Mathews     BSA: 2.1 m2  Height: 71 in  Weight: 190 lb  HR: 78  BP: 134/99 mmHg  ______________________________________________________________________________  Procedure  Limited Portable Echo Adult. Optison (NDC #[id number]) given  intravenously.  ______________________________________________________________________________  Interpretation Summary     There is mild concentric left ventricular hypertrophy.  The visual ejection fraction is 60-65%.  The gradient is normal for this prosthetic aortic valve.  TAVR Villanueva 29 Ultra valve  The mean AoV pressure gradient is 15mmHg.  ______________________________________________________________________________  Left Ventricle  The left ventricle is normal in size. There is mild concentric left  ventricular hypertrophy. Proximal septal thickening is noted. The visual  ejection fraction is 60-65%. Normal left ventricular wall motion. There is no  thrombus seen in the left ventricle.     Right Ventricle  The right ventricle is normal in size and function.     Atria  Normal left atrial size. Right atrial size is normal.     Mitral Valve  There is mild mitral annular calcification. There is trace mitral  regurgitation. There is no mitral valve stenosis.     Tricuspid Valve  There is trace tricuspid regurgitation. Right ventricular systolic pressure  could not be approximated due to inadequate tricuspid regurgitation.     Aortic Valve  No aortic regurgitation is present. The mean AoV pressure gradient is 15mmHg.  The gradient is normal for this prosthetic aortic valve. TAVR Villanueva 29 Ultra  valve.     Pericardium  The pericardium appears normal.     Rhythm  Sinus rhythm was noted.     ______________________________________________________________________________  MMode/2D Measurements & Calculations  IVSd: 1.2 cm  LVIDd: 5.4 cm  LVIDs: 3.5 cm  LVPWd: 1.3 cm  FS: 34.4 %  LV mass(C)d: 278.9 grams  LV mass(C)dI: 135.1 grams/m2  asc Aorta Diam: 3.6 cm  LVOT diam: 2.3 cm  LVOT area: 4.1 cm2  RWT: 0.50     Doppler Measurements & Calculations  MV max P.5 mmHg  MV mean P.4 mmHg  MV V2 VTI: 29.5 cm  MVA(VTI): 2.6 cm2  Ao V2 max: 274.0 cm/sec  Ao max P.0 mmHg  Ao V2 mean: 178.0 cm/sec  Ao mean PG: 15.3  mmHg  Ao V2 VTI: 44.8 cm  ZAKI(I,D): 1.7 cm2  ZAKI(V,D): 1.7 cm2  LV V1 max P.0 mmHg  LV V1 max: 112.0 cm/sec  LV V1 VTI: 18.6 cm  SV(LVOT): 77.1 ml  SI(LVOT): 37.4 ml/m2  AV Jamar Ratio (DI): 0.41  ZAKI Index (cm2/m2): 0.83     ______________________________________________________________________________  Report approved by: Silvia Rose 2023 10:44 AM         Echocardiogram Complete   Result Value    LVEF  60-65%    Narrative    [id number]  [id number]  [id number]  [id number]^JOSÉ MIGUEL^J CARLOS^FOX     [facility]  Echocardiography Laboratory  [address] [place] [address]     Name: BUTCH MANUEL  MRN: [id number]  : 1940  Study Date: 2023 07:07 AM  Age: 82 yrs  Gender: Male  Patient Location: [facility]  Reason For Study: Severe aortic stenosis by prior echocardiogram  Ordering Physician: J CARLOS VALDEZ  Referring Physician: Mick Torres  Performed By: Argenis Christian     BSA: 2.1 m2  Height: 71 in  Weight: 190 lb  BP: 150/95 mmHg  ______________________________________________________________________________  Procedure  Complete Portable Echo Adult.  ______________________________________________________________________________  Interpretation Summary     PROCEDURE  Intra-procedural transthoracic echocardiogram for transfemoral transcatheter  aortic valve replacement with Villanueva 29 Ultra valve.     PRE-PROCEDURAL FINDINGS:  1. Severe calcific aortic valve stenosis with trace aortic regurgitation.  2. Normal left ventricular systolic function. Visually estimated LVEF 60-65%.     POST-PROCEDURAL SURVEY:  1. The valve was successfully deployed.  2. Valve is well seated. No prosthetic or vandana-prosthetic regurgitation. Mean  systolic gradient 5 mmHg.  3. No pericardial effusion.     ______________________________________________________________________________  Left Ventricle  The left ventricle is normal in size. There is normal left ventricular wall  thickness. Left  ventricular systolic function is normal. The visual ejection  fraction is 60-65%. Septal motion is consistent with conduction abnormality.     Right Ventricle  The right ventricle is normal in size and function.     Atria  The left atrium is moderately dilated. Right atrial size is normal.     Mitral Valve  The mitral valve leaflets are mildly thickened. There is moderate mitral  annular calcification. There is trace mitral regurgitation. There is no mitral  valve stenosis. The mean mitral valve gradient is 2mmHg. Heart rate 70 BPM.     Tricuspid Valve  Normal tricuspid valve. There is trace tricuspid regurgitation. Right  ventricular systolic pressure could not be approximated due to inadequate  tricuspid regurgitation.     Aortic Valve  The aortic valve is trileaflet. There is trace to mild aortic regurgitation.  Severe valvular aortic stenosis.     Pulmonic Valve  The pulmonic valve is not well visualized.     Vessels  The aortic root is normal size. The inferior vena cava is normal.     Pericardium  There is no pericardial effusion.     Rhythm  Sinus rhythm was noted.  ______________________________________________________________________________  Doppler Measurements & Calculations  Ao V2 max: 173.7 cm/sec  Ao max P.0 mmHg  Ao V2 mean: 109.5 cm/sec  Ao mean P.5 mmHg  Ao V2 VTI: 33.6 cm     ______________________________________________________________________________  Report approved by: Dr Kulwinder Pitts 2023 10:49 AM         Echocardiogram Complete   Result Value    LVEF  60-65%    [facility]    [id number]  [id number]  [id number]  [id number]^JOSÉ MIGUEL^J CARLOS^FOX     [facility]  [facility]  Echocardiography Laboratory  [address]  [place] [address]  Phone [phone]  Fax [phone]     Name: BUTCH MANUEL  MRN: [id number]  : 1940  Study Date: 10/12/2022 01:33 PM  Age: 82 yrs  Gender: Male  Patient Location: [facility]  Reason For  Study: Severe aortic valve stenosis  Ordering Physician: J CARLOS VALDEZ  Referring Physician: J CARLOS VALDEZ  Performed By: Maryse Mathews     BSA: 2.1 m2  Height: 70 in  Weight: 206 lb  HR: 67  BP: 152/70 mmHg  ______________________________________________________________________________  Procedure  Complete Echo Adult. Optison (NDC #[id number]) given intravenously.     ______________________________________________________________________________  Interpretation Summary     The visual ejection fraction is 60-65%.  The right ventricle is normal in size and function.  Severe AS is noted. Peak velocity is 4.1 m/sec. ZAKI is 1 cm2.  The inferior vena cava was normal in size with preserved respiratory  variability.  There is no pericardial effusion.  ______________________________________________________________________________  Left Ventricle  The left ventricle is normal in size. The visual ejection fraction is 60-65%.  Diastolic Doppler findings (E/E' ratio and/or other parameters) suggest left  ventricular filling pressures are increased.     Right Ventricle  The right ventricle is normal in size and function.     Atria  Normal left atrial size. Right atrial size is normal.     Mitral Valve  There is moderate mitral annular calcification. There is trace to mild mitral  regurgitation. There is no mitral valve stenosis.     Tricuspid Valve  The tricuspid valve is not well visualized, but is grossly normal. There is  trace tricuspid regurgitation. Right ventricular systolic pressure could not  be approximated due to inadequate tricuspid regurgitation.     Aortic Valve  Increased aortic valve velocity. The calculated aortic valve are is 1.2 cm^2.  The peak AoV pressure gradient is 66.4 mmHg. The mean AoV pressure gradient is  34.8 mmHg.     Pulmonic Valve  The pulmonic valve is not well visualized.     Vessels  The aortic root is normal size. The inferior vena cava was normal in size with  preserved respiratory  "variability.     Pericardium  There is no pericardial effusion.     ______________________________________________________________________________  MMode/2D Measurements & Calculations  IVSd: 0.93 cm  LVIDd: 5.6 cm  LVIDs: 3.9 cm  LVPWd: 0.88 cm  FS: 29.7 %  LV mass(C)d: 189.9 grams  LV mass(C)dI: 89.8 grams/m2  Ao root diam: 3.8 cm  LA dimension: 4.9 cm  asc Aorta Diam: 3.4 cm  LA/Ao: 1.3  LVOT diam: 2.3 cm  LVOT area: 4.2 cm2  LA Volume (BP): 69.3 ml     LA Volume Index (BP): 32.8 ml/m2  RWT: 0.32     Doppler Measurements & Calculations  MV E max jamar: 76.2 cm/sec  MV A max jamar: 120.3 cm/sec  MV E/A: 0.63  MV dec slope: 198.4 cm/sec2  MV dec time: 0.38 sec  Ao V2 max: 411.5 cm/sec  Ao max P.4 mmHg  Ao V2 mean: 282.8 cm/sec  Ao mean P.8 mmHg  Ao V2 VTI: 90.6 cm  ZAKI(I,D): 1.2 cm2  ZAKI(V,D): 0.95 cm2  AI P1/2t: 437.1 msec  LV V1 max PG: 3.5 mmHg  LV V1 max: 93.6 cm/sec  LV V1 VTI: 25.2 cm  SV(LVOT): 105.6 ml  SI(LVOT): 50.0 ml/m2     PA acc time: 0.17 sec  AV Jamar Ratio (DI): 0.23  ZAKI Index (cm2/m2): 0.55  E/E' av.6  Lateral E/e': 15.6  Medial E/e': 17.6     ______________________________________________________________________________  Report approved by: Silvia Cabral 10/12/2022 04:18 PM              Clinically Significant Risk Factors Present on Admission                # Thrombocytopenia: Lowest platelets = 120 in last 2 days, will monitor for bleeding   # Overweight: Estimated body mass index is 25.8 kg/m  as calculated from the following:    Height as of this encounter: 1.803 m (5' 11\").    Weight as of this encounter: 83.9 kg (185 lb).    Cardiovascular: Cardiac Arrhythmia: Bradycardia, unspecified       "

## 2023-02-11 NOTE — H&P
[facility]    History and Physical - Hospitalist Service       Date of Admission:  2/11/2023    Assessment & Plan      Reynaldo Monteiro is a 82 year old male admitted on 2/11/2023 after he presented with a presyncopal event and found to have transient complete heart block on his Mcot monitor.     Presyncope  Symptomatic transient and advanced heart block  Status post TAVR on 2/7/2023  *Patient presented with presyncopal episodes while at home, never lost consciousness  *Patient was noted to have transient complete heart block post deployment of his valve during his elective TAVR on 2/7/2023 we will, which resolved.  * Patient discharged with a MCOT and was called on 2/10/23 by cardiology to notify him that rhythm strips of hims MCOT demonstrated A-V dissociation with heart rate in the 60s. Patient was asymptomatic at the time.   Plan  -- cardiology consulted: input appreciated.    - continue to hold PTA diltiazem and metoprolol succinate.    - continue PTA lisinopril at increased dose of 10mg BID   - EP consult   - no indication for temp pace maker at this time. Plan for PPM on Monday.  -- continue external pacer pads.   --  Continue PTA aspirin and statin.       Chronic problems  Type 2 diabetes -continue PTA Jardiance, cover with sliding scale insulin.  Chronic diastolic heart failure - not in exacerbation   Hypertension - continue PTA lisinopril, doxazosin  Hyperlipidemia- continue PTA statin       Diet:   diabetic didet  DVT Prophylaxis: Pneumatic Compression Devices  Gilliam Catheter: Not present  Lines: None     Cardiac Monitoring: None  Code Status:   Full    Clinically Significant Risk Factors Present on Admission                # Thrombocytopenia: Lowest platelets = 120 in last 2 days, will monitor for bleeding   # Hypertension: home medication list includes antihypertensive(s)      # Overweight: Estimated body mass index is 25.8 kg/m  as calculated from the following:    Height as  "of this encounter: 1.803 m (5' 11\").    Weight as of this encounter: 83.9 kg (185 lb).           Disposition Plan      Expected Discharge Date: 02/13/2023                  Marielle Masters MD  Hospitalist Service  [facility]  Securely message with [employer] (more info)  Text page via [facility] Paging/Directory     ______________________________________________________________________    Chief Complaint   Near syncope    History is obtained from the patient    History of Present Illness   Reynaldo Monteiro is a 82 year old male who presents after a presyncopal event    He has a PMHx significant for type 2 diabetes, hypertension, hyperlipidemia, chronic diastolic heart failure,  severe aortic stenosis s/p TAVR     Patient recently had an elective TAVR on 2/7/2023 discharged 2/8/2023.  During that hospitalization, patient was noted to have transient complete heart block post the appointment on the valve with no recurrent advanced AV block.  He was discharged with a 30-day event monitor.     Patient notes that he was called on 2/10/2023 by cardiology department due to alerts from his M cot monitor.  On review of epic, patient was called by cardiology to notify him that rhythm strips of hims MCOT demonstrated A-V dissociation with heart rate in the 60s.  Patient at the time is completely asymptomatic.  He was asked to present to the ED if he became symptomatic with any lightheadedness or dizziness.  Per patient, he developed a couple episodes of presyncopal symptoms today.  This prompted him to present to the ED.  He notes in route to the ER he continued to have these episodes but never had true syncope.  He did admit to nonspecific episode of chest pain which is now resolved.  Denies any associating shortness of breath, no fever or chills, no lower extremity swelling.     In the ED,  patient was seen by cardiology, Dr. Smith while in the ED point recommendations for pacemaker placement. A stat echo was " obtained which revealed EF 55 to 60% and a well-functioning valve.  No wall motion abnormality.       Past Medical History    Past Medical History:   Diagnosis Date     Aortic valve stenosis      Esophagitis, reflux      Hyperlipidemia      Hypertension      Lumbar disc disorder with myelopathy     bilateral leg weakness     RBBB (right bundle branch block)      S/p TAVR (transcatheter aortic valve replacement), bioprosthetic 2/7/2023     Severe aortic valve stenosis      Type 2 diabetes mellitus (H)        Past Surgical History   Past Surgical History:   Procedure Laterality Date     ANGIOGRAM      Patient reports coronary angiogram at [place], [place] approx 10 years ago     CV CORONARY ANGIOGRAM N/A 11/29/2022    Procedure: Coronary Angiogram;  Surgeon: Edwin Meadows MD;  Location:  [facility]     CV TRANSCATHETER AORTIC VALVE REPLACEMENT-FEMORAL APPROACH N/A 2/7/2023    Procedure: Transcatheter Aortic Valve Replacement-Femoral Approach;  Surgeon: Juan Jang MD;  Location:  [facility]ARD[facility] CATH LAB       Prior to Admission Medications   Prior to Admission Medications   Prescriptions Last Dose Informant Patient Reported? Taking?   Multiple Vitamins-Minerals (ZINC PO) 2/10/2023 at am Spouse/Significant Other Yes Yes   Sig: Take 1 tablet by mouth daily Patient unsure of dose   aspirin (ASA) 81 MG EC tablet 2/10/2023 at am Self No Yes   Sig: Take 1 tablet (81 mg) by mouth daily   diltiazem ER COATED BEADS (CARDIZEM CD/CARTIA XT) 240 MG 24 hr capsule 2/10/2023 at am Self Yes Yes   Sig: Take 240 mg by mouth daily   doxazosin (CARDURA) 4 MG tablet 2/10/2023 at pm Self Yes Yes   Sig: Take 2 mg by mouth At Bedtime (4MG X 0.5 = 2MG)   empagliflozin (JARDIANCE) 10 MG TABS tablet 2/10/2023 at am Self Yes Yes   Sig: Take 10 mg by mouth daily   glucosamine 500 MG CAPS capsule 2/10/2023 at am Self Yes Yes   Sig: Take 500 mg by mouth daily   lisinopril (ZESTRIL) 5 MG tablet 2/10/2023 at am Self  No Yes   Sig: Take 1 tablet (5 mg) by mouth daily   polyethylene glycol (MIRALAX) 17 GM/Dose powder  at PRN Self Yes Yes   Sig: Take 1 capful by mouth daily as needed for constipation   rosuvastatin (CRESTOR) 20 MG tablet 2/10/2023 at am Self Yes Yes   Sig: Take 10 mg by mouth daily (20MG X 0.5 = 10MG)   vitamin C with B complex (B COMPLEX-C) tablet 2/10/2023 at am Self Yes Yes   Sig: Take 1 tablet by mouth daily   vitamin D3 (CHOLECALCIFEROL) 50 mcg (2000 units) tablet 2/10/2023 at am Self Yes Yes   Sig: Take 1 tablet by mouth daily   vitamin E 400 UNIT capsule 2/10/2023 at am Self Yes Yes   Sig: Take 400 Units by mouth daily      Facility-Administered Medications: None        Review of Systems    The 10 point Review of Systems is negative other than noted in the HPI or here.      Physical Exam   Vital Signs: Temp: 97.5  F (36.4  C) Temp src: Oral BP: (!) 170/122 Pulse: 50   Resp: 28 SpO2: 97 % O2 Device: None (Room air)    Weight: 185 lbs 0 oz    General Appearance: Well appearing for stated age.  Respiratory: CTAB, no rales or ronchi  Cardiovascular: S1, S2 normal, no murmurs  GI: non-tender on palpation, BS present      Medical Decision Making       55 MINUTES SPENT BY ME on the date of service doing chart review, history, exam, documentation & further activities per the note.      Data     I have personally reviewed the following data over the past 24 hrs:    7.8  \   15.6   / 120 (L)     137 97 (L) 17.9 /  106 (H)   4.2 27 0.88 \       Trop: 18 BNP: N/A       TSH: 1.39 T4: N/A A1C: 6.4 (H)       INR:  N/A PTT:  N/A   D-dimer:  0.47 Fibrinogen:  N/A       Imaging results reviewed over the past 24 hrs:   Recent Results (from the past 24 hour(s))   XR Chest 2 Views    Narrative    EXAM: XR CHEST 2 VIEWS  LOCATION: [facility]  DATE/TIME: 2/11/2023 8:37 AM    INDICATION: Presyncope, post tavr  COMPARISON: None.      Impression    IMPRESSION:     Electronic device overlies the left upper  chest at the level of the anterior left second rib. Catheter deployed valvular prosthesis in aortic position. Normal cardiothoracic ratio. Mediastinal interfaces are well-defined. The right lateral wall of the   ascending aorta is visible on the frontal view which can relate to aortic ectasia.    Symmetric lung inflation. No airspace opacities or findings to suggest interstitial lung edema. Diaphragmatic curvature is preserved. No pleural effusion.    There are small thoracic spine degenerative osteophytes.   Echo Limited   Result Value    LVEF  55-60%    [facility]    [id number]  [id number]  [id number]^CORDELL^MERCEDES     [facility]  Echocardiography Laboratory  [address] [place], [place] [address]     Name: BUTCH MANUEL  MRN: [id number]  : 1940  Study Date: 2023 10:17 AM  Age: 82 yrs  Gender: Male  Patient Location: [facility]  Reason For Study: Syncope  Ordering Physician: MERCEDES LANDA  Referring Physician: MERCEDES LANDA  Performed By: Erik Araiza     BSA: 2.0 m2  Height: 71 in  Weight: 185 lb  HR: 52  ______________________________________________________________________________  Procedure  Limited Echo Adult. Optison (NDC #7560-3163) given intravenously.  ______________________________________________________________________________  Interpretation Summary     Possible 2nd degree AV block.  There is a bioprosthetic aortic valve. ( [address] EOA 1.2cm2/MSG  21mmHg/DI 0.28  Left ventricular systolic function is normal.  The visual ejection fraction is 55-60%.  The left ventricle is normal in size.  There is mild concentric left ventricular hypertrophy.  The right ventricle is normal in structure, function and size.     There has likely been no signficant change in the gradient across the aortic  vlave prosthesis as compared to 2023, however the rhythm appears to have  changed  ______________________________________________________________________________  Left  Ventricle  The left ventricle is normal in size. There is mild concentric left  ventricular hypertrophy. Left ventricular systolic function is normal. The  visual ejection fraction is 55-60%. No regional wall motion abnormalities  noted. There is no thrombus seen in the left ventricle.     Right Ventricle  The right ventricle is normal in structure, function and size.     Aortic Valve  There is trace aortic regurgitation. There is a bioprosthetic aortic valve.     Pulmonic Valve  The pulmonic valve is not well visualized.     Vessels  The aortic root is normal size. Normal size ascending aorta. Inferior vena  cava not well visualized for estimation of right atrial pressure.     Pericardium  The pericardium appears normal. There is no pleural effusion.     Rhythm  Possible 2nd degree AV block.  ______________________________________________________________________________  MMode/2D Measurements & Calculations  asc Aorta Diam: 3.7 cm  LVOT diam: 2.2 cm  LVOT area: 3.6 cm2     Doppler Measurements & Calculations  Ao V2 max: 320.4 cm/sec  Ao max P.0 mmHg  Ao V2 mean: 212.8 cm/sec  Ao mean P.8 mmHg  Ao V2 VTI: 70.0 cm  ZAKI(I,D): 1.1 cm2  ZAKI(V,D): 1.0 cm2  Ao acc time: 0.08 sec  LV V1 max PG: 3.2 mmHg  LV V1 max: 88.8 cm/sec  LV V1 VTI: 20.5 cm  SV(LVOT): 74.7 ml  SI(LVOT): 36.6 ml/m2  TR max jamar: 223.3 cm/sec  TR max P.9 mmHg     AV Jamar Ratio (DI): 0.28  ZAKI Index (cm2/m2): 0.52     ______________________________________________________________________________  Report approved by: Dr. Usama Otero 2023 01:15 PM

## 2023-02-11 NOTE — ED PROVIDER NOTES
"  History     Chief Complaint:  Syncope     HPI   Reynaldo Monteiro is a 82 year old male with a history of non-insulin diabetes mellitus, hypertension, hyperlipidemia, and severe aortic valve stenosis s/p TAVR on 2/8/23, who presents with feelings of near-syncope (no actual loss of consciousness) beginning last night and worsening this morning. The patient was discharged following TAVR 3 days ago with recorder on chest for regular cardiac monitoring. Last night, the patient experienced new mild chest pain (exacerbated with mild exertion) and palpitations with feelings of near syncope. With onset of this, nursing staff called him last night to confirm abnormal readings on the monitor and advised he schedule an appointment with cardiology on Monday, but to be seen in ED sooner in the event of persisting symptoms. Today, the patient did awake with ongoing discomfort and persisting lightheadedness, prompting concern and his presentation to ED at this time. En route per patient, he was \"in an out\" of consciousness but had no true syncope. No chest pain at this time. Denies shortness of breath, abdominal pain, or leg swelling. Patient has had good oral intake.    Independent Historian:   None - Patient Only    Review of External Notes: Discharge summary from 2/8/23 where he had a TAVR because of severe aortic stenosis, also history of transient complete heart block and hypertension    ROS:  Review of Systems   Constitutional: Negative for appetite change.   Respiratory: Negative for shortness of breath.    Cardiovascular: Positive for chest pain (resolved) and palpitations (resolved). Negative for leg swelling.   Gastrointestinal: Negative for abdominal pain.   Neurological: Positive for light-headedness. Negative for syncope (yes near).   All other systems reviewed and are negative.    Allergies:  No Known Drug Allergies     Medications:    ASA  Diltiazem  Doxazosin  Empagloflozin  Glucosamine  Lisinopril  Polyethylene " "glycol  Rosuvastatin  Vitamin D3  Vitamin C  Vitamin E    Past Medical History:    Aortic valve stenosis  Esophagitis, reflux  Hyperlipidemia  Hypertension  Lumbar disc disorder with myelopathy  RBBB  Severe aortic valve stenosis  Type 2 diabetes mellitus    Past Surgical History:    Angiogram  Coronary angiogram  TAVR    Family History:    CAD  Hypertension    Social History:  The patient presented with his wife.  The patient arrived in a private vehicle.  PCP: Mick Torres     Physical Exam     Patient Vitals for the past 24 hrs:   BP Temp Temp src Pulse Resp SpO2 Height Weight   02/11/23 1056 (!) 170/122 -- -- 50 28 97 % -- --   02/11/23 1041 (!) 143/61 -- -- 50 10 -- -- --   02/11/23 1026 (!) 162/58 -- -- 50 25 -- -- --   02/11/23 0909 -- -- -- 57 14 95 % -- --   02/11/23 0859 (!) 147/72 -- -- 58 21 95 % -- --   02/11/23 0849 (!) 143/75 -- -- 54 12 97 % -- --   02/11/23 0839 -- -- -- 56 (!) 32 96 % -- --   02/11/23 0821 (!) 169/81 -- -- 59 17 97 % -- --   02/11/23 0815 (!) 153/76 -- -- 60 18 97 % -- --   02/11/23 0811 (!) 150/53 97.5  F (36.4  C) Oral 53 20 98 % 1.803 m (5' 11\") 83.9 kg (185 lb)      Physical Exam  General: Alert, appears well-developed and well-nourished. Cooperative.     In mild distress  HEENT:  Head:  Atraumatic  Ears:  External ears are normal  Mouth/Throat:  Oropharynx is without erythema or exudate and mucous membranes are moist.   Eyes:   Conjunctivae normal and EOM are normal. No scleral icterus.  CV:  Bradycardic rate, irregular rhythm, normal heart sounds and radial pulses are 2+ and symmetric.  No murmur.  Resp:  Breath sounds are clear bilaterally    Non-labored, no retractions or accessory muscle use  GI:  Abdomen is soft, no distension, no tenderness. No rebound or guarding.  No CVA tenderness bilaterally  MS:  Normal range of motion. No edema.    Normal strength in all 4 extremities.     Back atraumatic.    No midline cervical, thoracic, or lumbar tenderness  Skin:  Warm and " dry.  No rash or lesions noted.  Neuro: Alert. Normal strength.  GCS: 15  Psych:  Normal mood and affect.    Emergency Department Course   ECG  ECG taken at 0803, ECG read at 0814  Critical test result: AV block.  Sinus rhythm with 2nd degree AV block (Mobitz I) with premature supraventricular complexes.  Left axis deviation.  Right bundle branch block.  Minimal voltage criteria for LVH, may be normal variant (R in aVL).  Cannot rule out inferior infarct.   No significant changes as compared to prior, dated 2/8/23.  Rate 73 bpm. VT interval * ms. QRS duration 148 ms. QT/QTc 424/467 ms. P-R-T axes * -36 9.     Imaging:  XR Chest 2 Views   Final Result   IMPRESSION:       Electronic device overlies the left upper chest at the level of the anterior left second rib. Catheter deployed valvular prosthesis in aortic position. Normal cardiothoracic ratio. Mediastinal interfaces are well-defined. The right lateral wall of the    ascending aorta is visible on the frontal view which can relate to aortic ectasia.      Symmetric lung inflation. No airspace opacities or findings to suggest interstitial lung edema. Diaphragmatic curvature is preserved. No pleural effusion.      There are small thoracic spine degenerative osteophytes.      Echo Limited    (Results Pending)      Report per radiology    Laboratory:  Labs Ordered and Resulted from Time of ED Arrival to Time of ED Departure   BASIC METABOLIC PANEL - Abnormal       Result Value    Sodium 137      Potassium 4.2      Chloride 97 (*)     Carbon Dioxide (CO2) 27      Anion Gap 13      Urea Nitrogen 17.9      Creatinine 0.88      Calcium 9.1      Glucose 153 (*)     GFR Estimate 87     CBC WITH PLATELETS AND DIFFERENTIAL - Abnormal    WBC Count 7.8      RBC Count 5.28      Hemoglobin 15.6      Hematocrit 47.0      MCV 89      MCH 29.5      MCHC 33.2      RDW 13.5      Platelet Count 120 (*)     % Neutrophils 60      % Lymphocytes 29      % Monocytes 9      % Eosinophils 2       % Basophils 0      % Immature Granulocytes 0      NRBCs per 100 WBC 0      Absolute Neutrophils 4.7      Absolute Lymphocytes 2.3      Absolute Monocytes 0.7      Absolute Eosinophils 0.1      Absolute Basophils 0.0      Absolute Immature Granulocytes 0.0      Absolute NRBCs 0.0     TROPONIN T, HIGH SENSITIVITY - Normal    Troponin T, High Sensitivity 18     TSH WITH FREE T4 REFLEX - Normal    TSH 1.39     D DIMER QUANTITATIVE - Normal    D-Dimer Quantitative 0.47        Emergency Department Course & Assessments:     Interventions:  Medications   0.9% sodium chloride BOLUS (has no administration in time range)   lisinopril (ZESTRIL) tablet 10 mg (has no administration in time range)   0.9% sodium chloride BOLUS (0 mLs Intravenous Stopped 2/11/23 1252)   perflutren diluted 1mL to 2mL with saline (OPTISON) diluted injection 9 mL (9 mLs Intravenous Given 2/11/23 1041)   sodium chloride (PF) 0.9% PF flush 10 mL (20 mLs Intravenous Given 2/11/23 1041)      Independent Interpretation (X-rays, CTs, rhythm strip):  Chest XR negative for acute opacities. No edema. Agree with radiology interpretation.    Consultations/Discussion of Management or Tests:  ED Course as of 02/11/23 1304   Sat Feb 11, 2023   0825 I consulted with Dr. Smith, cardiology, regarding the patient's history and presentation here in the emergency department.   0849 I spoke with ECHO team.   1151 I spoke with Dr. Smith, cardiology.   1241 I consulted with Dr. Masters, hospitalist, regarding the patient's history and presentation here in the emergency department who accepted the patient for admission.     Social Determinants of Health affecting care:   None    Assessments:  0815 I obtained history and examined the patient as noted above.  1304 I rechecked the patient and explained findings. I discussed plan for admission and the patient is in agreement.    Disposition:  The patient was admitted to the hospital under the care of Dr. Masters.     Impression & Plan       Medical Decision Making:  Patient is a 82-year-old male with a complex past medical history pertinent for severe aortic valve stenosis status post TAVR on 2/8/2023 who presents with episodes of near syncope since last night and worsening into this morning.  He was contacted by his cardiology team that he may have A-V dissociation based on remote electrical tracings.  EKG initially concerning for potential Mobitz 1 second-degree heart block.  Patient does at minimum appear to have symptomatic bradycardia.  Laboratory work appears unremarkable.  Chest x-ray appropriate in the recent post TAVR state.  I did speak with Dr. Smith of general cardiology who evaluated the patient in the emergency department following a limited echocardiogram.  He felt the patient was mildly hyperdynamic and recommended some IV rehydration.  Additionally he had spoken with our interventional cardiologist about whether the patient may require a temporary pacemaker over the weekend and potential permanent pacemaker placement early next week.  After discussion with the specialist service they decided to admit the patient over the weekend for continued cardiac monitoring with plans for permanent pacemaker placement on Monday.  Patient at this time will not require an emergent temporary pacemaker today.  We will continue to monitor closely and admit under the care of the hospitalist service.    Diagnosis:    ICD-10-CM    1. Pre-syncope  R55       2. History of transcatheter aortic valve replacement (TAVR)  Z95.2       3. Acquired heart block  I45.9       4. Symptomatic bradycardia  R00.1          Scribe Disclosure:  Shania FONSECA, am serving as a scribe at 8:12 AM on 2/11/2023 to document services personally performed by Marcelino Eyl MD based on my observations and the provider's statements to me.     2/11/2023   Marcelino Ely MD White, Scott, MD  02/11/23 7125

## 2023-02-11 NOTE — ED TRIAGE NOTES
Pt had heart valve replaced on Tuesday. Pt has been feeling like he will pass out multiple times. Pt had recorder on chest and nurse called him last night and said he needed to come in d/t reading from monitor     Triage Assessment     Row Name 02/11/23 0877       Triage Assessment (Adult)    Airway WDL WDL       Respiratory WDL    Respiratory WDL WDL       Cardiac WDL    Cardiac WDL X;chest pain       Peripheral/Neurovascular WDL    Peripheral Neurovascular WDL WDL       Cognitive/Neuro/Behavioral WDL    Cognitive/Neuro/Behavioral WDL WDL

## 2023-02-11 NOTE — TELEPHONE ENCOUNTER
Spoke with Spencer on 2/10 regarding alerts from his MCOT monitor. Rhythm strips demonstrate AV dissociation with HR in the 60's. He is completely asymptomatic. He denies any syncope or presyncope. We discussed that if he becomes symptomatic over the weekend with any lightheadedness or dizziness that he needs to present to ED. He understands and is agreeable to this plan. In addition, I will arrange follow-up in our EP clinic for evaluation of pacemaker early next week.

## 2023-02-12 LAB
ANION GAP SERPL CALCULATED.3IONS-SCNC: 9 MMOL/L (ref 7–15)
BASOPHILS # BLD AUTO: 0 10E3/UL (ref 0–0.2)
BASOPHILS NFR BLD AUTO: 0 %
BUN SERPL-MCNC: 14.2 MG/DL (ref 8–23)
CALCIUM SERPL-MCNC: 8.9 MG/DL (ref 8.8–10.2)
CHLORIDE SERPL-SCNC: 103 MMOL/L (ref 98–107)
CREAT SERPL-MCNC: 0.77 MG/DL (ref 0.67–1.17)
DEPRECATED HCO3 PLAS-SCNC: 26 MMOL/L (ref 22–29)
EOSINOPHIL # BLD AUTO: 0.1 10E3/UL (ref 0–0.7)
EOSINOPHIL NFR BLD AUTO: 2 %
ERYTHROCYTE [DISTWIDTH] IN BLOOD BY AUTOMATED COUNT: 13.4 % (ref 10–15)
GFR SERPL CREATININE-BSD FRML MDRD: >90 ML/MIN/1.73M2
GLUCOSE BLDC GLUCOMTR-MCNC: 104 MG/DL (ref 70–99)
GLUCOSE BLDC GLUCOMTR-MCNC: 124 MG/DL (ref 70–99)
GLUCOSE BLDC GLUCOMTR-MCNC: 156 MG/DL (ref 70–99)
GLUCOSE BLDC GLUCOMTR-MCNC: 186 MG/DL (ref 70–99)
GLUCOSE SERPL-MCNC: 112 MG/DL (ref 70–99)
HCT VFR BLD AUTO: 44.7 % (ref 40–53)
HGB BLD-MCNC: 14.7 G/DL (ref 13.3–17.7)
IMM GRANULOCYTES # BLD: 0 10E3/UL
IMM GRANULOCYTES NFR BLD: 0 %
LYMPHOCYTES # BLD AUTO: 1.8 10E3/UL (ref 0.8–5.3)
LYMPHOCYTES NFR BLD AUTO: 28 %
MCH RBC QN AUTO: 29.9 PG (ref 26.5–33)
MCHC RBC AUTO-ENTMCNC: 32.9 G/DL (ref 31.5–36.5)
MCV RBC AUTO: 91 FL (ref 78–100)
MONOCYTES # BLD AUTO: 0.6 10E3/UL (ref 0–1.3)
MONOCYTES NFR BLD AUTO: 10 %
NEUTROPHILS # BLD AUTO: 3.7 10E3/UL (ref 1.6–8.3)
NEUTROPHILS NFR BLD AUTO: 60 %
NRBC # BLD AUTO: 0 10E3/UL
NRBC BLD AUTO-RTO: 0 /100
PLATELET # BLD AUTO: 123 10E3/UL (ref 150–450)
POTASSIUM SERPL-SCNC: 4.2 MMOL/L (ref 3.4–5.3)
RBC # BLD AUTO: 4.91 10E6/UL (ref 4.4–5.9)
SODIUM SERPL-SCNC: 138 MMOL/L (ref 136–145)
WBC # BLD AUTO: 6.3 10E3/UL (ref 4–11)

## 2023-02-12 PROCEDURE — 36415 COLL VENOUS BLD VENIPUNCTURE: CPT | Performed by: HOSPITALIST

## 2023-02-12 PROCEDURE — 85004 AUTOMATED DIFF WBC COUNT: CPT | Performed by: HOSPITALIST

## 2023-02-12 PROCEDURE — 210N000001 HC R&B IMCU HEART CARE

## 2023-02-12 PROCEDURE — 250N000009 HC RX 250: Performed by: INTERNAL MEDICINE

## 2023-02-12 PROCEDURE — 250N000013 HC RX MED GY IP 250 OP 250 PS 637: Performed by: HOSPITALIST

## 2023-02-12 PROCEDURE — 80048 BASIC METABOLIC PNL TOTAL CA: CPT | Performed by: HOSPITALIST

## 2023-02-12 PROCEDURE — 250N000013 HC RX MED GY IP 250 OP 250 PS 637: Performed by: INTERNAL MEDICINE

## 2023-02-12 PROCEDURE — 99232 SBSQ HOSP IP/OBS MODERATE 35: CPT | Performed by: INTERNAL MEDICINE

## 2023-02-12 RX ORDER — MUPIROCIN 20 MG/G
OINTMENT TOPICAL ONCE
Status: COMPLETED | OUTPATIENT
Start: 2023-02-12 | End: 2023-02-12

## 2023-02-12 RX ADMIN — SENNOSIDES AND DOCUSATE SODIUM 1 TABLET: 50; 8.6 TABLET ORAL at 21:00

## 2023-02-12 RX ADMIN — DOXAZOSIN MESYLATE 2 MG: 2 TABLET ORAL at 22:19

## 2023-02-12 RX ADMIN — ANTISEPTIC SURGICAL SCRUB: 0.04 SOLUTION TOPICAL at 20:46

## 2023-02-12 RX ADMIN — LISINOPRIL 10 MG: 10 TABLET ORAL at 09:43

## 2023-02-12 RX ADMIN — EMPAGLIFLOZIN 10 MG: 10 TABLET, FILM COATED ORAL at 09:43

## 2023-02-12 RX ADMIN — MUPIROCIN: 20 OINTMENT TOPICAL at 20:47

## 2023-02-12 RX ADMIN — ASPIRIN 81 MG: 81 TABLET, COATED ORAL at 09:43

## 2023-02-12 RX ADMIN — ROSUVASTATIN CALCIUM 10 MG: 10 TABLET, FILM COATED ORAL at 09:43

## 2023-02-12 RX ADMIN — LISINOPRIL 10 MG: 10 TABLET ORAL at 20:46

## 2023-02-12 ASSESSMENT — ACTIVITIES OF DAILY LIVING (ADL)
ADLS_ACUITY_SCORE: 35

## 2023-02-12 NOTE — PROGRESS NOTES
[facility]  Hospitalist Progress Note    Admit Date:  2023  Date of Service (when I saw the patient): 2023   Provider:  Tresa Garcia, DO    Assessment & Plan   Reynaldo Monteiro is a 82 year old male with a  PMH of recent TAVR (23) who was admitted on 2023 after he presented with a presyncopal event and found to have transient complete heart block on his Mcot monitor.     Problem List:    1. Presyncope  Symptomatic transient and advanced heart block  Status post TAVR on 2023    *Patient presented with presyncopal episodes while at home, never lost consciousness  *Patient was noted to have transient complete heart block post deployment of his valve during his elective TAVR on 2023 we will, which resolved.  * Patient discharged with a MCOT and was called on 2/10/23 by cardiology to notify him that rhythm strips of hims MCOT demonstrated A-V dissociation with heart rate in the 60s. Patient was asymptomatic at the time.     Overnight he has continued to feel lightheaded, dizzy, headaches when HR drops into the 40's.  Events do not last very long.     Cardiology consulted and following  Holding PTA cardizem 240mg/day, for now  Had not been on metoprolol following TAVR discharge d/t transient complete heart block post valve deployment.    Planning for EP study 23 unless there is a concerning clinical change.    Continue PTA aspirin and statin.      2. Type 2 diabetes   Blood sugars have ranged 106-186  continue PTA Jardiance,  continue with sliding scale insulin, for now and trend BS    3. Chronic diastolic heart failure -   not in exacerbation    4.  Hypertension - continue PTA lisinopril 10mg daily  Continue on PTA doxazosin    5.  Hyperlipidemia- continue PTA statin       MEDICAL DECISION MAKIN  MINUTES SPENT BY ME on the date of service doing chart review, history, exam, documentation & further activities per the note.       Labs/Imaging  Reviewed:  See Information above and Data section below        Diet:   diabetic didet  DVT Prophylaxis: Pneumatic Compression Devices  Gilliam Catheter: Not present  Lines: None     Cardiac Monitoring: None  Diet: Regular Diet Adult    DVT Prophylaxis: Pneumatic Compression Devices and up out of bed  Gilliam Catheter: Not present  Code Status: Full Code      Disposition Plan      Expected Discharge Date: 02/13/2023             Entered: Tresa STEVENSRajesh Garcia, DO 02/12/2023, 6:43 AM    Interval History     Had short bouts of intermittent headache, flushing and feelings of  Lightheadedness/dizziness overnight.   RN noted that these symptoms correlated with bradycardia seen on tele monitoring and  HR in the 40's.  Currently feels well.   Did not sleep very much.   No F/C, N/V, cough or chest pain/pressure at this time.      -Data reviewed today: I reviewed all new labs and imaging results over the last 24 hours. I personally reviewed no images or EKG's today.    Physical Exam   Temp: 98.2  F (36.8  C) Temp src: Oral BP: (!) 153/50 Pulse: 89   Resp: 15 SpO2: 95 % O2 Device: None (Room air)    Vitals:    02/11/23 0811 02/12/23 0500   Weight: 83.9 kg (185 lb) 83.3 kg (183 lb 9.6 oz)     Vital Signs with Ranges  Temp:  [97.5  F (36.4  C)-98.2  F (36.8  C)] 98.2  F (36.8  C)  Pulse:  [49-89] 89  Resp:  [7-32] 15  BP: (118-170)/() 153/50  SpO2:  [95 %-98 %] 95 %  I/O last 3 completed shifts:  In: -   Out: 700 [Urine:700]    GEN:  Alert, awake, elderly male who  appears to be in NAD at rest in bed currently    HEENT:  Normocephalic/atraumatic, no scleral icterus, no nasal discharge, membranes appears fairly moist.  NECK:  No clear thyromegaly or JVD  CV:  irregular rate and rhythm with occasional pauses ausc, sys murmur to ausc.  S1 + S2 noted, no S3 or S4.  LUNGS:  Clear to auscultation ant/lat bilaterally.  No rales/rhonchi/wheezing auscultated bilaterally.  No costal retractions bilaterally.  Symmetric chest rise on  inhalation noted.  ABD:  Active bowel sounds, soft, non-tender/non-distended.  No rebound/guarding/rigidity.  No masses palpated.  No obvious HSM to exam.  EXT:  No significant pretibial edema or cyanosis bilaterally. No joint synovitis noted.  No calf-tenderness or asymmetry noted.  SKIN:  Dry to touch, no rashes or jaundice noted.  PSYCH:  Mood appropriate, Not tearful or depressed.  Maintains direct eye contact.  NEURO:  No tremors at rest,  Somewhat  Tribal but speech is clear and appropriate.  CN  2-12 intact to gross testing bilaterally    Data   Labs:  Recent Labs   Lab 02/12/23 0831 02/12/23 0543 02/12/23 0152 02/11/23 1755 02/11/23 0818 02/08/23 1207 02/08/23  0555   NA  --  138  --   --  137  --  136   POTASSIUM  --  4.2  --   --  4.2  --  4.0   CHLORIDE  --  103  --   --  97*  --  99   CO2  --  26  --   --  27  --  27   ANIONGAP  --  9  --   --  13  --  10   * 112* 186*   < > 153*   < > 142*   BUN  --  14.2  --   --  17.9  --  13.6   CR  --  0.77  --   --  0.88  --  0.73   GFRESTIMATED  --  >90  --   --  87  --  >90   GIULIANO  --  8.9  --   --  9.1  --  9.2    < > = values in this interval not displayed.     Recent Labs   Lab 02/12/23 0543 02/11/23 0818 02/08/23  0555   WBC 6.3 7.8 10.8   HGB 14.7 15.6 15.2   HCT 44.7 47.0 46.5   MCV 91 89 90   * 120* 120*     Recent Labs   Lab 02/12/23 0831 02/12/23 0543 02/12/23 0152 02/11/23 1755 02/11/23 0818 02/08/23 1207 02/08/23  0555   NA  --  138  --   --  137  --  136   POTASSIUM  --  4.2  --   --  4.2  --  4.0   CHLORIDE  --  103  --   --  97*  --  99   CO2  --  26  --   --  27  --  27   ANIONGAP  --  9  --   --  13  --  10   * 112* 186*   < > 153*   < > 142*   BUN  --  14.2  --   --  17.9  --  13.6   CR  --  0.77  --   --  0.88  --  0.73   GFRESTIMATED  --  >90  --   --  87  --  >90   GIULIANO  --  8.9  --   --  9.1  --  9.2   MAG  --   --   --   --   --   --  2.0   PHOS  --   --   --   --   --   --  3.7    < > = values in this interval  not displayed.     No results for input(s): INR in the last 168 hours.  No results for input(s): TROPONIN, TROPI, TROPR in the last 168 hours.    Invalid input(s): TROP, TROPONINIES  No results for input(s): COLOR, APPEARANCE, URINEGLC, URINEBILI, URINEKETONE, SG, UBLD, URINEPH, PROTEIN, UROBILINOGEN, NITRITE, LEUKEST, RBCU, WBCU in the last 168 hours.   Recent Imaging:   Recent Results (from the past 24 hour(s))   XR Chest 2 Views    Narrative    EXAM: XR CHEST 2 VIEWS  LOCATION: [facility]  DATE/TIME: 2023 8:37 AM    INDICATION: Presyncope, post tavr  COMPARISON: None.      Impression    IMPRESSION:     Electronic device overlies the left upper chest at the level of the anterior left second rib. Catheter deployed valvular prosthesis in aortic position. Normal cardiothoracic ratio. Mediastinal interfaces are well-defined. The right lateral wall of the   ascending aorta is visible on the frontal view which can relate to aortic ectasia.    Symmetric lung inflation. No airspace opacities or findings to suggest interstitial lung edema. Diaphragmatic curvature is preserved. No pleural effusion.    There are small thoracic spine degenerative osteophytes.   Echo Limited   Result Value    LVEF  55-60%    Narrative    [id number]  [id number]  [id number]  [id number]^CORDELL^MERCEDES     [facility]  Echocardiography Laboratory  [address] [place], [place] [address]     Name: BUTCH MANUEL  MRN: [id number]  : 1940  Study Date: 2023 10:17 AM  Age: 82 yrs  Gender: Male  Patient [facility]: [facility]  Reason For Study: Syncope  Ordering Physician: MERCEDES LANDA  Referring Physician: MERCEDES LANDA  Performed By: Erik Araiza     BSA: 2.0 m2  Height: 71 in  Weight: 185 lb  HR: 52  ______________________________________________________________________________  Procedure  Limited Echo Adult. Optison (NDC #[id number]) given  intravenously.  ______________________________________________________________________________  Interpretation Summary     Possible 2nd degree AV block.  There is a bioprosthetic aortic valve. ( [address] EOA 1.2cm2/MSG  21mmHg/DI 0.28  Left ventricular systolic function is normal.  The visual ejection fraction is 55-60%.  The left ventricle is normal in size.  There is mild concentric left ventricular hypertrophy.  The right ventricle is normal in structure, function and size.     There has likely been no signficant change in the gradient across the aortic  vlave prosthesis as compared to 2023, however the rhythm appears to have  changed  ______________________________________________________________________________  Left Ventricle  The left ventricle is normal in size. There is mild concentric left  ventricular hypertrophy. Left ventricular systolic function is normal. The  visual ejection fraction is 55-60%. No regional wall motion abnormalities  noted. There is no thrombus seen in the left ventricle.     Right Ventricle  The right ventricle is normal in structure, function and size.     Aortic Valve  There is trace aortic regurgitation. There is a bioprosthetic aortic valve.     Pulmonic Valve  The pulmonic valve is not well visualized.     Vessels  The aortic root is normal size. Normal size ascending aorta. Inferior vena  cava not well visualized for estimation of right atrial pressure.     Pericardium  The pericardium appears normal. There is no pleural effusion.     Rhythm  Possible 2nd degree AV block.  ______________________________________________________________________________  MMode/2D Measurements & Calculations  asc Aorta Diam: 3.7 cm  LVOT diam: 2.2 cm  LVOT area: 3.6 cm2     Doppler Measurements & Calculations  Ao V2 max: 320.4 cm/sec  Ao max P.0 mmHg  Ao V2 mean: 212.8 cm/sec  Ao mean P.8 mmHg  Ao V2 VTI: 70.0 cm  ZAKI(I,D): 1.1 cm2  ZAKI(V,D): 1.0 cm2  Ao acc time: 0.08 sec  LV  V1 max PG: 3.2 mmHg  LV V1 max: 88.8 cm/sec  LV V1 VTI: 20.5 cm  SV(LVOT): 74.7 ml  SI(LVOT): 36.6 ml/m2  TR max jamar: 223.3 cm/sec  TR max P.9 mmHg     AV Jamar Ratio (DI): 0.28  ZAKI Index (cm2/m2): 0.52     ______________________________________________________________________________  Report approved by: Dr. Usama Otero 2023 01:15 PM             Medications       aspirin  81 mg Oral Daily     doxazosin  2 mg Oral At Bedtime     empagliflozin  10 mg Oral Daily     insulin aspart  1-7 Units Subcutaneous TID AC     insulin aspart  1-5 Units Subcutaneous At Bedtime     lisinopril  10 mg Oral BID     rosuvastatin  10 mg Oral Daily     sodium chloride (PF)  3 mL Intracatheter Q8H

## 2023-02-12 NOTE — PLAN OF CARE
Goal Outcome Evaluation:       AOR X 4, denies any CP  discomfort, Up in the rm, refused to be bedrest   No acute events, bp stable, HR from 30-66.   Plan for EP consult tomorrow and possible P[P[M.   NPO after midnight.

## 2023-02-12 NOTE — PROGRESS NOTES
[facility] Cardiology Progress Note      Subjective   82 M who developed symptomatic intermittent CHB post TAVR, he had multiple episodes of presyncope and significant lightheadedness    Interval events  Continues with intermittent high grade AV block, occasionally feels pauses            Objective     VITAL SIGNS  Temp:  [98.2  F (36.8  C)] 98.2  F (36.8  C)  Pulse:  [49-89] 89  Resp:  [7-32] 15  BP: (118-170)/() 153/50  SpO2:  [95 %-98 %] 95 %  Admission Weight: 83.9 kg (185 lb)  Current Weight: 83.3 kg (183 lb 9.6 oz)    PHYSICAL EXAMINATION  General:  Well-developed, well-nourished. No acute distress. Alert and oriented x 3.  Pleasant and cooperative.  HEENT:  Extraocular movements grossly intact. Sclera Anicteric.   Cardiovascular: Irregular, intermittently bhanu, S1/2, 2/6 LORE  Lungs:  Normal work of breathing. Clear to auscultation bilaterally.   Abdomen:  Soft, nontender, nondistended  Extremities:  Warm, well perfused.No significant edema.   Neuro: Moving all extremities, no gross deficits noted    DIAGNOSTICS    Most Recent 3 CBC's:  Recent Labs   Lab Test 02/12/23  0543 02/11/23  0818 02/08/23  0555   WBC 6.3 7.8 10.8   HGB 14.7 15.6 15.2   MCV 91 89 90   * 120* 120*     Most Recent 3 BMP's:  Recent Labs   Lab Test 02/12/23  0543 02/12/23  0152 02/11/23  2116 02/11/23  1755 02/11/23  0818 02/08/23  1207 02/08/23  0555     --   --   --  137  --  136   POTASSIUM 4.2  --   --   --  4.2  --  4.0   CHLORIDE 103  --   --   --  97*  --  99   CO2 26  --   --   --  27  --  27   BUN 14.2  --   --   --  17.9  --  13.6   CR 0.77  --   --   --  0.88  --  0.73   ANIONGAP 9  --   --   --  13  --  10   GIULIANO 8.9  --   --   --  9.1  --  9.2   * 186* 106*   < > 153*   < > 142*    < > = values in this interval not displayed.     Most Recent 3 Troponin's:No lab results found.  Most Recent 3 BNP's:  Recent Labs   Lab Test 01/31/23  1038 01/04/23  1014    NTBNP 145 81     Most Recent Cholesterol Panel:  Recent Labs   Lab Test 10/04/22  1000 04/17/19  0000   CHOL  --  96   LDL  --  37   HDL  --  39   TRIG 132 112       TTE  Interpretation Summary     Possible 2nd degree AV block.  There is a bioprosthetic aortic valve. ( [address] EOA 1.2cm2/MSG  21mmHg/DI 0.28  Left ventricular systolic function is normal.  The visual ejection fraction is 55-60%.  The left ventricle is normal in size.  There is mild concentric left ventricular hypertrophy.  The right ventricle is normal in structure, function and size.     There has likely been no signficant change in the gradient across the aortic  vlave prosthesis as compared to 2/8/2023, however the rhythm appears to have  changed    Assessment & Plan     # Symptomatic transient advanced heart block (detected on outpatient cardiac monitor), currently stable  # Status post TAVR with a 29 mm Villanueva FRIDA 3 valve (2/7/2023), transient complete heart block post deployment of valve  # HTN  # HL    Plan:  -- No indication for emergent temp pacing  -- EP consult tomorrow, anticipate will qualify for permament pacing; Maintains sinus rhythm, normal LVEF, likely DC-PPM  -- NPO tonight, Bactroban and hibiclens wash  -- Avoid heparin products after midnight  -- Continue Asa, Rosuvastatin, Lisinopril      Medical Decision Making       30 MINUTES SPENT BY ME on the date of service doing chart review, history, exam, documentation & further activities per the note.       Zev Vivas MD  2/12/2023

## 2023-02-12 NOTE — PLAN OF CARE
[id number]  Alert and oriented times four  Bedrest, SBA with urinal at bedside   Room air  Denies chest pain   Denies SOB  Dizzy, head hurts, and warm when HR dips to 30 or below but resolved quickly, otherwise asymptomatic with bradycardia   SR type 2 block  Plan: Continue to monitor

## 2023-02-13 ENCOUNTER — APPOINTMENT (OUTPATIENT)
Dept: CARDIOLOGY | Facility: CLINIC | Age: 83
DRG: 243 | End: 2023-02-13
Attending: INTERNAL MEDICINE
Payer: MEDICARE

## 2023-02-13 DIAGNOSIS — Z95.0 CARDIAC PACEMAKER IN SITU: Primary | ICD-10-CM

## 2023-02-13 LAB
GLUCOSE BLDC GLUCOMTR-MCNC: 156 MG/DL (ref 70–99)
GLUCOSE BLDC GLUCOMTR-MCNC: 93 MG/DL (ref 70–99)
LVEF ECHO: NORMAL

## 2023-02-13 PROCEDURE — 210N000001 HC R&B IMCU HEART CARE

## 2023-02-13 PROCEDURE — C1785 PMKR, DUAL, RATE-RESP: HCPCS | Performed by: INTERNAL MEDICINE

## 2023-02-13 PROCEDURE — 250N000009 HC RX 250: Performed by: INTERNAL MEDICINE

## 2023-02-13 PROCEDURE — 250N000013 HC RX MED GY IP 250 OP 250 PS 637: Performed by: HOSPITALIST

## 2023-02-13 PROCEDURE — C1894 INTRO/SHEATH, NON-LASER: HCPCS | Performed by: INTERNAL MEDICINE

## 2023-02-13 PROCEDURE — 272N000001 HC OR GENERAL SUPPLY STERILE: Performed by: INTERNAL MEDICINE

## 2023-02-13 PROCEDURE — 99153 MOD SED SAME PHYS/QHP EA: CPT | Performed by: INTERNAL MEDICINE

## 2023-02-13 PROCEDURE — 02H63JZ INSERTION OF PACEMAKER LEAD INTO RIGHT ATRIUM, PERCUTANEOUS APPROACH: ICD-10-PCS | Performed by: INTERNAL MEDICINE

## 2023-02-13 PROCEDURE — 93005 ELECTROCARDIOGRAM TRACING: CPT

## 2023-02-13 PROCEDURE — 99233 SBSQ HOSP IP/OBS HIGH 50: CPT | Mod: 25 | Performed by: INTERNAL MEDICINE

## 2023-02-13 PROCEDURE — 02HK3JZ INSERTION OF PACEMAKER LEAD INTO RIGHT VENTRICLE, PERCUTANEOUS APPROACH: ICD-10-PCS | Performed by: INTERNAL MEDICINE

## 2023-02-13 PROCEDURE — C1898 LEAD, PMKR, OTHER THAN TRANS: HCPCS | Performed by: INTERNAL MEDICINE

## 2023-02-13 PROCEDURE — 0JH606Z INSERTION OF PACEMAKER, DUAL CHAMBER INTO CHEST SUBCUTANEOUS TISSUE AND FASCIA, OPEN APPROACH: ICD-10-PCS | Performed by: INTERNAL MEDICINE

## 2023-02-13 PROCEDURE — 93321 DOPPLER ECHO F-UP/LMTD STD: CPT | Mod: 26 | Performed by: INTERNAL MEDICINE

## 2023-02-13 PROCEDURE — 93308 TTE F-UP OR LMTD: CPT | Mod: 26 | Performed by: INTERNAL MEDICINE

## 2023-02-13 PROCEDURE — 255N000002 HC RX 255 OP 636: Performed by: INTERNAL MEDICINE

## 2023-02-13 PROCEDURE — 93325 DOPPLER ECHO COLOR FLOW MAPG: CPT | Mod: 26 | Performed by: INTERNAL MEDICINE

## 2023-02-13 PROCEDURE — 33208 INSRT HEART PM ATRIAL & VENT: CPT | Mod: KX | Performed by: INTERNAL MEDICINE

## 2023-02-13 PROCEDURE — C8924 2D TTE W OR W/O FOL W/CON,FU: HCPCS

## 2023-02-13 PROCEDURE — 93010 ELECTROCARDIOGRAM REPORT: CPT | Performed by: INTERNAL MEDICINE

## 2023-02-13 PROCEDURE — 999N000208 ECHOCARDIOGRAM LIMITED

## 2023-02-13 PROCEDURE — 99223 1ST HOSP IP/OBS HIGH 75: CPT | Mod: 25 | Performed by: INTERNAL MEDICINE

## 2023-02-13 PROCEDURE — 99232 SBSQ HOSP IP/OBS MODERATE 35: CPT | Performed by: INTERNAL MEDICINE

## 2023-02-13 PROCEDURE — 250N000011 HC RX IP 250 OP 636: Performed by: INTERNAL MEDICINE

## 2023-02-13 PROCEDURE — 99152 MOD SED SAME PHYS/QHP 5/>YRS: CPT | Performed by: INTERNAL MEDICINE

## 2023-02-13 DEVICE — PACEMAKER PROMRI DUAL CHAMBER: Type: IMPLANTABLE DEVICE | Status: FUNCTIONAL

## 2023-02-13 DEVICE — LEAD PACING SOLIA S 60 PROMRI: Type: IMPLANTABLE DEVICE | Status: FUNCTIONAL

## 2023-02-13 DEVICE — LEAD PACING SOLIA S 53 PROMRI: Type: IMPLANTABLE DEVICE | Status: FUNCTIONAL

## 2023-02-13 RX ORDER — OXYCODONE AND ACETAMINOPHEN 5; 325 MG/1; MG/1
1 TABLET ORAL EVERY 4 HOURS PRN
Status: DISCONTINUED | OUTPATIENT
Start: 2023-02-13 | End: 2023-02-14 | Stop reason: HOSPADM

## 2023-02-13 RX ORDER — CEFAZOLIN SODIUM 2 G/100ML
2 INJECTION, SOLUTION INTRAVENOUS EVERY 8 HOURS
Status: DISCONTINUED | OUTPATIENT
Start: 2023-02-13 | End: 2023-02-14 | Stop reason: HOSPADM

## 2023-02-13 RX ORDER — LIDOCAINE 40 MG/G
CREAM TOPICAL
Status: CANCELLED | OUTPATIENT
Start: 2023-02-13

## 2023-02-13 RX ORDER — SODIUM CHLORIDE 450 MG/100ML
INJECTION, SOLUTION INTRAVENOUS CONTINUOUS
Status: CANCELLED | OUTPATIENT
Start: 2023-02-13

## 2023-02-13 RX ORDER — CEFAZOLIN SODIUM 2 G/100ML
2 INJECTION, SOLUTION INTRAVENOUS
Status: CANCELLED | OUTPATIENT
Start: 2023-02-13

## 2023-02-13 RX ORDER — FENTANYL CITRATE 50 UG/ML
INJECTION, SOLUTION INTRAMUSCULAR; INTRAVENOUS
Status: DISCONTINUED | OUTPATIENT
Start: 2023-02-13 | End: 2023-02-13 | Stop reason: HOSPADM

## 2023-02-13 RX ORDER — BUPIVACAINE HYDROCHLORIDE 2.5 MG/ML
INJECTION, SOLUTION EPIDURAL; INFILTRATION; INTRACAUDAL
Status: DISCONTINUED | OUTPATIENT
Start: 2023-02-13 | End: 2023-02-13 | Stop reason: HOSPADM

## 2023-02-13 RX ORDER — CEFAZOLIN SODIUM 2 G/100ML
INJECTION, SOLUTION INTRAVENOUS CONTINUOUS PRN
Status: COMPLETED | OUTPATIENT
Start: 2023-02-13 | End: 2023-02-13

## 2023-02-13 RX ADMIN — EMPAGLIFLOZIN 10 MG: 10 TABLET, FILM COATED ORAL at 09:05

## 2023-02-13 RX ADMIN — HUMAN ALBUMIN MICROSPHERES AND PERFLUTREN 9 ML: 10; .22 INJECTION, SOLUTION INTRAVENOUS at 10:29

## 2023-02-13 RX ADMIN — ROSUVASTATIN CALCIUM 10 MG: 10 TABLET, FILM COATED ORAL at 09:05

## 2023-02-13 RX ADMIN — DOXAZOSIN MESYLATE 2 MG: 2 TABLET ORAL at 21:10

## 2023-02-13 RX ADMIN — LISINOPRIL 10 MG: 10 TABLET ORAL at 20:49

## 2023-02-13 RX ADMIN — ACETAMINOPHEN 650 MG: 325 TABLET, FILM COATED ORAL at 15:24

## 2023-02-13 RX ADMIN — SENNOSIDES AND DOCUSATE SODIUM 2 TABLET: 50; 8.6 TABLET ORAL at 15:24

## 2023-02-13 RX ADMIN — LISINOPRIL 10 MG: 10 TABLET ORAL at 09:05

## 2023-02-13 RX ADMIN — ASPIRIN 81 MG: 81 TABLET, COATED ORAL at 09:05

## 2023-02-13 RX ADMIN — CEFAZOLIN SODIUM 2 G: 2 INJECTION, SOLUTION INTRAVENOUS at 20:50

## 2023-02-13 ASSESSMENT — ACTIVITIES OF DAILY LIVING (ADL)
ADLS_ACUITY_SCORE: 20
WEAR_GLASSES_OR_BLIND: YES
VISION_MANAGEMENT: GLASSES
ADLS_ACUITY_SCORE: 35
CONCENTRATING,_REMEMBERING_OR_MAKING_DECISIONS_DIFFICULTY: NO
HEARING_DIFFICULTY_OR_DEAF: NO
ADLS_ACUITY_SCORE: 20
TOILETING_ISSUES: NO
ADLS_ACUITY_SCORE: 35
DOING_ERRANDS_INDEPENDENTLY_DIFFICULTY: NO
ADLS_ACUITY_SCORE: 20
ADLS_ACUITY_SCORE: 20
DRESSING/BATHING_DIFFICULTY: NO
ADLS_ACUITY_SCORE: 20
ADLS_ACUITY_SCORE: 20
ADLS_ACUITY_SCORE: 35
FALL_HISTORY_WITHIN_LAST_SIX_MONTHS: NO
ADLS_ACUITY_SCORE: 35
CHANGE_IN_FUNCTIONAL_STATUS_SINCE_ONSET_OF_CURRENT_ILLNESS/INJURY: NO
ADLS_ACUITY_SCORE: 35
WALKING_OR_CLIMBING_STAIRS_DIFFICULTY: NO
DIFFICULTY_COMMUNICATING: NO
ADLS_ACUITY_SCORE: 35
DIFFICULTY_EATING/SWALLOWING: NO

## 2023-02-13 NOTE — PROGRESS NOTES
"[facility]    Medicine Progress Note - Hospitalist Service    Date of Admission:  2/11/2023    Assessment & Plan   Reynaldo Monteiro is a 82 year old male with a  PMH of recent TAVR (2/7/23) who was admitted on 2/11/2023 after he presented with a presyncopal event and found to have transient complete heart block on his Mcot monitor.       Transient complete heart block, symptomatic w/near syncope  H/o AS s/p TAVR on 2/7/2023  HTN/HLP  HFpEF.  Not decompensated    Patient presented with presyncopal episodes while at home, never lost consciousness.  Patient was noted to have transient complete heart block post deployment of his valve during his elective TAVR on 2/7/2023 which resolved.  Patient discharged with a MCOT and was called on 2/10/23 by cardiology to notify him that rhythm strips of hims MCOT demonstrated A-V dissociation with heart rate in the 60s. Patient was asymptomatic at the time.   - Monitor on telemetry  - Holding PTA cardizem 240mg/day  - Had not been on metoprolol following TAVR discharge d/t transient complete heart block post valve deployment  - PTA ASA and statin   - PTA Lisinopril and Doxazosin   - Cardiology following and appreciate their recommendations.  Plan is likely PPM today     Type 2 diabetes   Blood sugars have ranged 106-186  - Continue PTA Jardiance,  - Continue with sliding scale insulin, for now and trend BS           Diet: NPO per Anesthesia Guidelines for Procedure/Surgery Except for: Meds    DVT Prophylaxis: Pneumatic Compression Devices  Gilliam Catheter: Not present  Lines: None     Cardiac Monitoring: None  Code Status: Full Code      Clinically Significant Risk Factors                # Thrombocytopenia: Lowest platelets = 123 in last 2 days, will monitor for bleeding         # Overweight: Estimated body mass index is 25.55 kg/m  as calculated from the following:    Height as of this encounter: 1.803 m (5' 11\").    Weight as of this encounter: 83.1 kg " (183 lb 3.2 oz)., PRESENT ON ADMISSION         Disposition Plan     Expected Discharge Date: 2023                  Joseph Salazar DO  Hospitalist Service  [facility]  Securely message with [employer] (more info)  Text page via [email or web address] Paging/Directory   ______________________________________________________________________    Interval History   Patient seen and examined.  No acute events over night.  No further episodes of light headedness or near syncope.  No chest pain or SOB.  No palpitations.  Currently NPO but no nausea or vomiting.     Physical Exam   Vital Signs: Temp: 98.1  F (36.7  C) Temp src: Oral BP: (!) 157/72 Pulse: (!) 46   Resp: 20 SpO2: 98 % O2 Device: None (Room air)    Weight: 183 lbs 3.2 oz    General Appearance: Resting comfortably. NAD   Respiratory: Clear to auscultation.  No respiratory distress  Cardiovascular: Bradycardiac.  No obvious murmurs  GI: Soft.  Non-distended  Skin: No obvious rashes or cyanosis to exposed skin  Other: Alert.  No edema      Medical Decision Making       45 MINUTES SPENT BY ME on the date of service doing chart review, history, exam, documentation & further activities per the note.      Data         Imaging results reviewed over the past 24 hrs:   Recent Results (from the past 24 hour(s))   Echocardiogram Limited   Result Value    LVEF  55-60%    Narrative    [id number]  [id number]  [id number]  [id number]^DENISE^DUC^NICOLETTE     [facility]  Echocardiography Laboratory  [address] [place] [address]     Name: BUTCH MANUEL  MRN: [id number]  : 1940  Study Date: 2023 10:06 AM  Age: 82 yrs  Gender: Male  Patient Location: [facility]  Reason For Study: Aortic Valve Disorder  Ordering Physician: DUC WALKER  Referring Physician: Mick Torres  Performed By: Aydee Sena     BSA: 2.0 m2  Height: 71 in  Weight: 183 lb  HR: 46  BP: 157/72  mmHg  ______________________________________________________________________________  Procedure  Limited Portable Echo Adult. Optison (NDC #1042-8227) given intravenously.  ______________________________________________________________________________  Interpretation Summary     Left ventricular systolic function is normal.  The visual ejection fraction is 55-60%.  No regional wall motion abnormalities noted.  There is a bioprosthetic aortic valve. ( [address] )  The prosthetic aortic valve is not well visualized.  The mean AoV pressure gradient is 28mmHg.  The gradient is abnormal for this prosthetic aortic valve.  There is mild (1+) aortic regurgitation.  Prior echo from 2/11/23 revealed mean gradient across AV of 21mm.  ______________________________________________________________________________  Left Ventricle  Left ventricular diastolic function is indeterminate. Left ventricular  systolic function is normal. The visual ejection fraction is 55-60%. No  regional wall motion abnormalities noted.     Right Ventricle  The right ventricle is normal size. The right ventricular systolic function is  normal.     Mitral Valve  There is trace mitral regurgitation.     Tricuspid Valve  No tricuspid regurgitation. Right ventricular systolic pressure could not be  approximated due to inadequate tricuspid regurgitation.     Aortic Valve  There is mild (1+) aortic regurgitation. The mean AoV pressure gradient is  28mmHg. There is a bioprosthetic aortic valve. ( [address] ). The  prosthetic aortic valve is not well visualized. The gradient is abnormal for  this prosthetic aortic valve.     Pulmonic Valve  The pulmonic valve is not well visualized.     Vessels  The aortic root is normal size.     Pericardium  There is no pericardial effusion.     Rhythm  [place], possible AV block. The rhythm was undetermined.     ______________________________________________________________________________  MMode/2D  Measurements & Calculations  LVOT diam: 2.2 cm  LVOT area: 3.9 cm2     Doppler Measurements & Calculations  Ao V2 max: 357.0 cm/sec  Ao max P.0 mmHg  Ao V2 mean: 252.1 cm/sec  Ao mean P.1 mmHg  Ao V2 VTI: 73.7 cm  ZAKI(I,D): 1.3 cm2  ZAKI(V,D): 1.4 cm2     Ao acc time: 0.08 sec  LV V1 max P.4 mmHg  LV V1 max: 126.8 cm/sec  LV V1 VTI: 24.5 cm  SV(LVOT): 95.3 ml  SI(LVOT): 46.9 ml/m2  AV Jamar Ratio (DI): 0.36  ZAKI Index (cm2/m2): 0.64     ______________________________________________________________________________  Report approved by: Silvia Chávez 2023 10:52 AM

## 2023-02-13 NOTE — PLAN OF CARE
Spencer is alert, oriented, very pleasant. VSS on room air. New PPM placed today. CMS intact. Mild discomfort treated with ice and Tylenol with adequate relief. Anticipate discharge to home tomorrow.

## 2023-02-13 NOTE — PROGRESS NOTES
[facility]    Cardiology Progress Note    Primary Cardiologist: Dr. Meadows    Date of Admission: 2/11/2023  Service Date: 02/13/23  PT SEEN AND EXAMINED BY ME TODAY  ALL OBSERVATIONS AND PLANS ARE MINE  I CURRENTLY HAVE A CALL OUT TO THE TAVR TEAM MD/RN AND TO ECHO  MY MAIN CONCERN IS LOUD MURMUR AND RISING GRADIENT  THIS IS MY FIRST VISIT WITH PT    INTRA OP TAVR TTE NO SKYLER  POST MEAN GRAD 5  NEXT DAY GRAD 15  A FEW DAYS AGO GRAD 22  MURMUR SEEMS RATHER LOUD FOR A TAVR SO I WILL GET ANOTHER TTE LIMITED    PT AWAIT EPS CONSULT FOR DDD PACER TODAY        60 MIN VISIT  Summary:  Mr. Reynaldo Monteiro is a very pleasant 82 year old male with a past medical history of severe aortic stenosis, s/p TAVR (2/7/2023), hypertension, hyperlipidemia, and type II diabetes who was admitted on 2/11/2023 for symptomatic bradycardia and complete heart block detected on outpatient monitor. Cardiology was consulted for complete heart block.    Interval History   Patient remains asymptomatic. EP consult is pending for today.     Telemetry: transient complete heart block with right BBB, intermittent pauses noted overnight    Assessment & Plan   1. Symptomatic transient advanced heart block, detected on outpatient cardiac monitor  -Developed dizziness and lightheadedness on 2/11 after event monitor detected complete heart block  -Continue to hold metoprolol and diltiazem  -Temp pacing not currently indicated  -EP consult pending for today, patient to remain NPO    2. S/P TAVR (2/7/2023) Villanueva Davi 3, 29mm valve transient complete heart block post deployment of valve  - TTE 2/11/23 with no significant change in valve gradient, LVEF normal  -Continue aspirin     3. Hypertension  - Diltiazem and metoprolol on hold, continue  -BP control improved with increase of lisinopril to 10mg daily    4. Hyperlipidemia  - Continue rosuvastatin    5. Type II diabetes  -Continue jardiance    Plan:   1. Keep patient NPO in  preparation for possible PPM, pending EP consultation  2. Continue to hold diltiazem and metoprolol   SEE ABOVE    Thank you for the opportunity to participate in this pleasant patient's care.     CALIEDGAR 60MIN    Patient Active Problem List   Diagnosis     Type 2 diabetes mellitus (H)     Benign essential hypertension     Aortic stenosis     Hyperlipidemia     RBBB (right bundle branch block)     Lumbar disc disorder with myelopathy     Status post coronary angiogram     S/p TAVR (transcatheter aortic valve replacement), bioprosthetic     * * * SBE PROPHYLAXIS * * *     Aortic stenosis, severe     Pre-syncope     Acquired heart block     Symptomatic bradycardia     History of transcatheter aortic valve replacement (TAVR)       Physical Exam   Temp: 98.1  F (36.7  C) Temp src: Oral BP: (!) 157/72 Pulse: (!) 46   Resp: 20 SpO2: 98 % O2 Device: None (Room air)    Vitals:    02/11/23 0811 02/12/23 0500 02/13/23 0600   Weight: 83.9 kg (185 lb) 83.3 kg (183 lb 9.6 oz) 83.1 kg (183 lb 3.2 oz)     Vital Signs with Ranges  Temp:  [97.3  F (36.3  C)-99  F (37.2  C)] 98.1  F (36.7  C)  Pulse:  [42-65] 46  Resp:  [16-20] 20  BP: (118-157)/(60-80) 157/72  SpO2:  [95 %-98 %] 98 %  I/O last 3 completed shifts:  In: 360 [P.O.:360]  Out: -     Constitutional: Appears his stated age, well nourished, and in no acute distress.  Eyes: Pupils equal, round. Sclerae anicteric.   HEENT: Normocephalic, atraumatic.   Neck: Supple. Carotid pulses full and equal. No carotid bruit. No anterior cervical or supraclavicular lymphadenopathy appreciated. No JVD appreciated.  Respiratory: Breathing non-labored. Lungs clear to auscultation bilaterally. No crackles, wheezes, rhonchi, or rales.SOMEWHAT POOR AIR MOVT  (NO DEEP BREATH ?)  Cardiovascular: Regular rhythm, bradycardic, normal S1 and S2.LOUD SYST MURMUR AND PT IN 3RD AVBLOCK SO OCC LIANG A WAVES IN JVP AND VARIABLE MURMUR    GI: Soft, non-distended, non-tender, bowel sounds present in all  four quadrants.  Skin: Warm, dry. No rashes, cyanosis, edema, or xanthelasma.  Musculoskeletal/Extremities: Moves all extremities well and symmetrically. No edema.  Neurologic: No gross focal deficits. Alert, awake, and oriented to person, place and time.  Psychiatric: Affect appropriate. Mentation normal.    Medications       aspirin  81 mg Oral Daily     doxazosin  2 mg Oral At Bedtime     empagliflozin  10 mg Oral Daily     insulin aspart  1-7 Units Subcutaneous TID AC     insulin aspart  1-5 Units Subcutaneous At Bedtime     lisinopril  10 mg Oral BID     rosuvastatin  10 mg Oral Daily     sodium chloride (PF)  3 mL Intracatheter Q8H       Data   Recent Labs   Lab 02/12/23  2104 02/12/23  1717 02/12/23  0831 02/12/23  0543 02/11/23  1755 02/11/23  0818 02/08/23  1207 02/08/23  0555   WBC  --   --   --  6.3  --  7.8  --  10.8   HGB  --   --   --  14.7  --  15.6  --  15.2   MCV  --   --   --  91  --  89  --  90   PLT  --   --   --  123*  --  120*  --  120*   NA  --   --   --  138  --  137  --  136   POTASSIUM  --   --   --  4.2  --  4.2  --  4.0   CHLORIDE  --   --   --  103  --  97*  --  99   CO2  --   --   --  26  --  27  --  27   BUN  --   --   --  14.2  --  17.9  --  13.6   CR  --   --   --  0.77  --  0.88  --  0.73   ANIONGAP  --   --   --  9  --  13  --  10   GIULIANO  --   --   --  8.9  --  9.1  --  9.2   * 156* 124* 112*   < > 153*   < > 142*    < > = values in this interval not displayed.         This note was completed in part using Dragon voice recognition software. Although reviewed after completion, some word and grammatical errors may occur.

## 2023-02-13 NOTE — PLAN OF CARE
2/13/2023 [id number]  Reason for Admission: 3rd degree heart block, POD 0 permanent   Pacemaker placement  Precautions: None    Cognitive/Mentation: A/Ox 4  VS: stable on RA. Tele: A paced with some V-pacing.  GI: BS +, + flatus. Continent.  : Voiding adequately. Continent.  Pulmonary: LS clear.  Pain: Denies.     IVs: PIV SL  Drains: None  Skin: Pacemaker site CDI, covered with bandage  Activity: SBA w/ GB.  Diet: Mod CHO with thin liquids. Takes pills whole.     Aggression Stoplight Score: Green  Therapies recs: Pending  Discharge: Pending    End of shift summary: Pacemaker placed this AM, frequent vitals and site checks completed. No episodes of bradycardia post-pacemaker placement.

## 2023-02-13 NOTE — PLAN OF CARE
"Neuro: A/Ox4   Most Recent Vitals: /67 (BP Location: Right arm)   Pulse (!) 42   Temp 97.9  F (36.6  C) (Oral)   Resp 16   Ht 1.803 m (5' 11\")   Wt 83.3 kg (183 lb 9.6 oz)   SpO2 96%   BMI 25.61 kg/m    Tele/Cardiac: 3*AVB, BBB, HR 40-50s, 1 episode of 17 bpm- pt felt LH/D   Cardiac Sx: denies CP, SOB; occasional LH/D w/ low HR   Resp: WDL- RA   Pain: denies   IV: RUE PIV SL   Skin: R & L groin sites from 2/7/23- BOB, scattered bruising, R middle finger amputated   GI/: WDL, last BM 2/10/23- senna given   Mobility: A1 w/ GB- bedrest   Diet: Orders Placed This Encounter      NPO per Anesthesia Guidelines for Procedure/Surgery Except for: Meds  Plan: Awaiting EP consult and possible pacemaker placement, continue current plan of care.                           "

## 2023-02-13 NOTE — CONSULTS
"Electrophysiology/ Cardiology- Consult Note         H&P and Plan:     Reason for consult: Paroxysmal complete AV block.      History of present illness: Patient is a pleasant 82-year old gentleman with a history of hypertension, hyperlipidemia, diabetes, chronic kidney disease, chronic RBBB pattern EKG and severe aortic stenosis, who underwent TAVR procedure (2/7/2023) and postop was complicated by paroxysmal complete AV block.  EP was consulted for consideration for pacemaker potation.    Patient underwent TAVR on 2/7/2023.  He is known to have chronic RBBB pattern EKG and he developed transient complete AV block after TAVR.  AV conduction eventually improved, and he was sent home with event monitor.  Event monitor showed episodes of paroxysmal AV block.  He was then brought back to the hospital for evaluation.     At the moment, he is doing well and seems to be asymptomatic.  He denies chest pain, shortness of breath, near-syncope or syncope.    EKG on admission showed second-degree AV block and underlying RBBB pattern.    Previous studies:  -Echo (2/8/2023): EF of 60 to 65%.  TAVR valve noticed with a mean gradient 15 mmHg.    Plan:  1.  Paroxysmal complete AV block after TAVR implantation.  I recommend permanent pacemaker implantation.  Procedure was explained details.  He understand there is 1-2% risk of cases or procedure.  Consent was signed.    Felix Pabon MD  Clinically Significant Risk Factors Present on Admission            # Overweight: Estimated body mass index is 25.55 kg/m  as calculated from the following:    Height as of this encounter: 1.803 m (5' 11\").    Weight as of this encounter: 83.1 kg (183 lb 3.2 oz).    Cardiovascular: Cardiac Arrhythmia: Bradycardia, unspecified      Hematology/Oncology: Thrombocytopenia Including Purpuric, HIT, & Other Platelet Defects: Coagulation defect, unspecified    Nephrology: CKD POA List: Stage 1 (GFR >/= 90)    Limitations of activities/Fatigue (optional): " "Chronic Fatigue and Other Debilities: Other reduced mobility         Physical Exam:  Vitals: BP (!) 157/72 (BP Location: Right arm)   Pulse (!) 46   Temp 98.1  F (36.7  C) (Oral)   Resp 20   Ht 1.803 m (5' 11\")   Wt 83.1 kg (183 lb 3.2 oz)   SpO2 98%   BMI 25.55 kg/m        Intake/Output Summary (Last 24 hours) at 2/13/2023 0845  Last data filed at 2/13/2023 0442  Gross per 24 hour   Intake 360 ml   Output --   Net 360 ml     Vitals:    02/11/23 0811 02/12/23 0500 02/13/23 0600   Weight: 83.9 kg (185 lb) 83.3 kg (183 lb 9.6 oz) 83.1 kg (183 lb 3.2 oz)       Constitutional:  AAO x3.  Pt is in NAD.  HEAD: Normocephalic.  SKIN: Skin normal color, texture and turgor with no lesions or eruptions.  Eyes: PERRL, EOMI.  ENT:  Supple, normal JVP. No lymphadenopathy or thyroid enlargement.  Chest:  CTAB.  Cardiac:   RRR, normal  S1 and S2.  No murmurs rubs or gallop.   Abdomen:  Normal BS.  Soft, non-tender and non-distended.  No rebound or guarding.    Extremities:  Pedious pulses palpable B/L.  No LE edema noticed.   Neurological: Strength and sensation grossly symmetric and intact throughout.         Review of Systems:  Complete review of system is otherwise negative with the exception of what was described above.     CURRENT MEDICATIONS:    aspirin  81 mg Oral Daily     doxazosin  2 mg Oral At Bedtime     empagliflozin  10 mg Oral Daily     insulin aspart  1-7 Units Subcutaneous TID AC     insulin aspart  1-5 Units Subcutaneous At Bedtime     lisinopril  10 mg Oral BID     rosuvastatin  10 mg Oral Daily     sodium chloride (PF)  3 mL Intracatheter Q8H     PRN Meds: acetaminophen **OR** acetaminophen, glucose **OR** dextrose **OR** glucagon, lidocaine 4%, lidocaine (buffered or not buffered), melatonin, ondansetron **OR** ondansetron, senna-docusate **OR** senna-docusate, sodium chloride (PF)    ALLERGIES   No Known Allergies    PAST MEDICAL HISTORY:  Past Medical History:   Diagnosis Date     Aortic valve stenosis  "     Esophagitis, reflux      Hyperlipidemia      Hypertension      Lumbar disc disorder with myelopathy     bilateral leg weakness     RBBB (right bundle branch block)      S/p TAVR (transcatheter aortic valve replacement), bioprosthetic 2/7/2023     Severe aortic valve stenosis      Type 2 diabetes mellitus (H)        PAST SURGICAL HISTORY:  Past Surgical History:   Procedure Laterality Date     ANGIOGRAM      Patient reports coronary angiogram at [place], [place] approx 10 years ago     CV CORONARY ANGIOGRAM N/A 11/29/2022    Procedure: Coronary Angiogram;  Surgeon: Edwin Meadows MD;  Location:  [facility]     CV TRANSCATHETER AORTIC VALVE REPLACEMENT-FEMORAL APPROACH N/A 2/7/2023    Procedure: Transcatheter Aortic Valve Replacement-Femoral Approach;  Surgeon: Juan Jang MD;  Location:  HEART CARD[facility] CATH LAB       FAMILY HISTORY:  The patient's family history was reviewed and is non-contributory for heart disease.    SOCIAL HISTORY:  Social History     Socioeconomic History     Marital status:      Spouse name: Karissa     Number of children: None     Years of education: None     Highest education level: None   Occupational History     Occupation: Small business owner   Tobacco Use     Smoking status: Never     Smokeless tobacco: Never   Substance and Sexual Activity     Alcohol use: Yes     Alcohol/week: 4.0 standard drinks     Types: 4 Drinks containing 0.5 oz of alcohol per week     Comment: 1-2 a week      Drug use: Never         Recent Lab Results:  Recent Labs   Lab 02/12/23  2104 02/12/23  1717 02/12/23  0831 02/12/23  0543 02/11/23  1755 02/11/23  0818 02/08/23  1207 02/08/23  0555   WBC  --   --   --  6.3  --  7.8  --  10.8   HGB  --   --   --  14.7  --  15.6  --  15.2   MCV  --   --   --  91  --  89  --  90   PLT  --   --   --  123*  --  120*  --  120*   NA  --   --   --  138  --  137  --  136   POTASSIUM  --   --   --  4.2  --  4.2  --  4.0   CHLORIDE  --   --    --  103  --  97*  --  99   CO2  --   --   --  26  --  27  --  27   BUN  --   --   --  14.2  --  17.9  --  13.6   CR  --   --   --  0.77  --  0.88  --  0.73   ANIONGAP  --   --   --  9  --  13  --  10   GIULIANO  --   --   --  8.9  --  9.1  --  9.2   * 156* 124* 112*   < > 153*   < > 142*    < > = values in this interval not displayed.

## 2023-02-14 ENCOUNTER — APPOINTMENT (OUTPATIENT)
Dept: GENERAL RADIOLOGY | Facility: CLINIC | Age: 83
DRG: 243 | End: 2023-02-14
Attending: INTERNAL MEDICINE
Payer: MEDICARE

## 2023-02-14 ENCOUNTER — DOCUMENTATION ONLY (OUTPATIENT)
Dept: CARDIOLOGY | Facility: CLINIC | Age: 83
End: 2023-02-14
Payer: MEDICARE

## 2023-02-14 VITALS
SYSTOLIC BLOOD PRESSURE: 113 MMHG | OXYGEN SATURATION: 97 % | RESPIRATION RATE: 19 BRPM | TEMPERATURE: 97.7 F | WEIGHT: 183.2 LBS | HEART RATE: 69 BPM | BODY MASS INDEX: 25.65 KG/M2 | HEIGHT: 71 IN | DIASTOLIC BLOOD PRESSURE: 70 MMHG

## 2023-02-14 LAB
GLUCOSE BLDC GLUCOMTR-MCNC: 144 MG/DL (ref 70–99)
GLUCOSE BLDC GLUCOMTR-MCNC: 150 MG/DL (ref 70–99)

## 2023-02-14 PROCEDURE — 99232 SBSQ HOSP IP/OBS MODERATE 35: CPT | Performed by: INTERNAL MEDICINE

## 2023-02-14 PROCEDURE — 250N000012 HC RX MED GY IP 250 OP 636 PS 637: Performed by: HOSPITALIST

## 2023-02-14 PROCEDURE — 99239 HOSP IP/OBS DSCHRG MGMT >30: CPT | Performed by: INTERNAL MEDICINE

## 2023-02-14 PROCEDURE — 250N000013 HC RX MED GY IP 250 OP 250 PS 637: Performed by: HOSPITALIST

## 2023-02-14 PROCEDURE — 71046 X-RAY EXAM CHEST 2 VIEWS: CPT

## 2023-02-14 PROCEDURE — 250N000011 HC RX IP 250 OP 636: Performed by: INTERNAL MEDICINE

## 2023-02-14 RX ORDER — LISINOPRIL 10 MG/1
10 TABLET ORAL 2 TIMES DAILY
Qty: 60 TABLET | Refills: 0 | Status: SHIPPED | OUTPATIENT
Start: 2023-02-14 | End: 2023-02-28

## 2023-02-14 RX ADMIN — LISINOPRIL 10 MG: 10 TABLET ORAL at 08:48

## 2023-02-14 RX ADMIN — INSULIN ASPART 1 UNITS: 100 INJECTION, SOLUTION INTRAVENOUS; SUBCUTANEOUS at 12:58

## 2023-02-14 RX ADMIN — INSULIN ASPART 1 UNITS: 100 INJECTION, SOLUTION INTRAVENOUS; SUBCUTANEOUS at 08:50

## 2023-02-14 RX ADMIN — CEFAZOLIN SODIUM 2 G: 2 INJECTION, SOLUTION INTRAVENOUS at 04:58

## 2023-02-14 RX ADMIN — ASPIRIN 81 MG: 81 TABLET, COATED ORAL at 08:48

## 2023-02-14 RX ADMIN — ROSUVASTATIN CALCIUM 10 MG: 10 TABLET, FILM COATED ORAL at 08:48

## 2023-02-14 RX ADMIN — EMPAGLIFLOZIN 10 MG: 10 TABLET, FILM COATED ORAL at 08:48

## 2023-02-14 ASSESSMENT — ACTIVITIES OF DAILY LIVING (ADL)
ADLS_ACUITY_SCORE: 20

## 2023-02-14 NOTE — PROGRESS NOTES
[facility]    Cardiology Progress Note    Primary Cardiologist: Dr. Meadows    Date of Admission: 2/11/2023  Service Date: 02/13/23  Pt feels well  S/p ddd pacer yesterday, tele ok  cxr today no ptx  Somewhat high tavr gradients noted yesterday  I would recommend a limited echo in a few weeks to check tavr gradient-I spoke directly to tavr RN and tavr MD but I dont see a note in chart from them      60 MIN VISIT  Summary:  Mr. Reynaldo Monteiro is a very pleasant 82 year old male with a past medical history of severe aortic stenosis, s/p TAVR (2/7/2023), hypertension, hyperlipidemia, and type II diabetes who was admitted on 2/11/2023 for symptomatic bradycardia and complete heart block detected on outpatient monitor. Cardiology was consulted for complete heart block.    Interval History   Patient remains asymptomatic.         Assessment & Plan   1. Symptomatic transient advanced heart block, detected on outpatient cardiac monitor  -Developed dizziness and lightheadedness on 2/11 after event monitor detected complete heart block  DDD pacer 2/13  May wish to restart BB etc in clinic later    2. S/P TAVR (2/7/2023) Villanueva Davi 3, 29mm valve transient complete heart block post deployment of valve  -see above re increased tavr valve gradient    3. Hypertension  - Diltiazem and metoprolol on hold, if bp goes up we can restart dilt or BB now that pacer in  -BP control improved with increase of lisinopril to 10mg daily    4. Hyperlipidemia  - Continue rosuvastatin    5. Type II diabetes  -Continue jardiance    Home today per im  I called our office to cancel his ov this week  Will have him go to pacer clinic next week  General cardio visit in 2-4 weeks and they may wish to order a follow-up echo    Thank you for the opportunity to participate in this pleasant patient's care.     DENISE 30MIN    Patient Active Problem List   Diagnosis     Type 2 diabetes mellitus (H)     Benign essential  hypertension     Aortic stenosis     Hyperlipidemia     RBBB (right bundle branch block)     Lumbar disc disorder with myelopathy     Status post coronary angiogram     S/p TAVR (transcatheter aortic valve replacement), bioprosthetic     * * * SBE PROPHYLAXIS * * *     Aortic stenosis, severe     Pre-syncope     Acquired heart block     Symptomatic bradycardia     History of transcatheter aortic valve replacement (TAVR)       Physical Exam   Temp: 97.7  F (36.5  C) Temp src: Oral BP: 113/70 Pulse: 69   Resp: 19 SpO2: 97 % O2 Device: None (Room air)    Vitals:    02/11/23 0811 02/12/23 0500 02/13/23 0600   Weight: 83.9 kg (185 lb) 83.3 kg (183 lb 9.6 oz) 83.1 kg (183 lb 3.2 oz)     Vital Signs with Ranges  Temp:  [97.5  F (36.4  C)-97.7  F (36.5  C)] 97.7  F (36.5  C)  Pulse:  [69-78] 69  Resp:  [16-19] 19  BP: (113-144)/() 113/70  SpO2:  [95 %-97 %] 97 %  No intake/output data recorded.    Constitutional: Appears his stated age, well nourished, and in no acute distress.  Eyes: Pupils equal, round. Sclerae anicteric.   HEENT: Normocephalic, atraumatic.   Neck: Supple. Carotid pulses full and equal. No carotid bruit. No anterior cervical or supraclavicular lymphadenopathy appreciated. No JVD appreciated.  Respiratory: Breathing non-labored. Lungs clear to auscultation bilaterally. No crackles, wheezes, rhonchi, or rales.SOMEWHAT POOR AIR MOVT  (NO DEEP BREATH ?)  Cardiovascular: Regular rhythm, bradycardic, normal S1 and S2.LOUD SYST MURMUR AND PT IN 3RD AVBLOCK SO OCC LIANG A WAVES IN JVP AND VARIABLE MURMUR    GI: Soft, non-distended, non-tender, bowel sounds present in all four quadrants.  Skin: Warm, dry. No rashes, cyanosis, edema, or xanthelasma.  Musculoskeletal/Extremities: Moves all extremities well and symmetrically. No edema.  Neurologic: No gross focal deficits. Alert, awake, and oriented to person, place and time.  Psychiatric: Affect appropriate. Mentation normal.    Medications       aspirin  81  mg Oral Daily     ceFAZolin  2 g Intravenous Q8H     doxazosin  2 mg Oral At Bedtime     empagliflozin  10 mg Oral Daily     insulin aspart  1-7 Units Subcutaneous TID AC     insulin aspart  1-5 Units Subcutaneous At Bedtime     lisinopril  10 mg Oral BID     rosuvastatin  10 mg Oral Daily     sodium chloride (PF)  3 mL Intracatheter Q8H       Data   Recent Labs   Lab 02/14/23  0830 02/13/23  2138 02/13/23  1728 02/12/23  0831 02/12/23  0543 02/11/23  1755 02/11/23  0818 02/08/23  1207 02/08/23  0555   WBC  --   --   --   --  6.3  --  7.8  --  10.8   HGB  --   --   --   --  14.7  --  15.6  --  15.2   MCV  --   --   --   --  91  --  89  --  90   PLT  --   --   --   --  123*  --  120*  --  120*   NA  --   --   --   --  138  --  137  --  136   POTASSIUM  --   --   --   --  4.2  --  4.2  --  4.0   CHLORIDE  --   --   --   --  103  --  97*  --  99   CO2  --   --   --   --  26  --  27  --  27   BUN  --   --   --   --  14.2  --  17.9  --  13.6   CR  --   --   --   --  0.77  --  0.88  --  0.73   ANIONGAP  --   --   --   --  9  --  13  --  10   GIULIANO  --   --   --   --  8.9  --  9.1  --  9.2   * 156* 93   < > 112*   < > 153*   < > 142*    < > = values in this interval not displayed.         This note was completed in part using Dragon voice recognition software. Although reviewed after completion, some word and grammatical errors may occur.

## 2023-02-14 NOTE — DISCHARGE INSTRUCTIONS
Discharge Instructions for Pacemaker Implantation  You have had a procedure to insert a pacemaker. Once inside your body, this small electronic device helps keep your heart from beating too slowly. A pacemaker can t fix existing heart problems. But it can help you feel better and have more energy. As you recover, follow all of the instructions you are given, including those below.  Activity  Follow the instructions you are given about limiting your activity.  Do not raise your arm on the incision side above shoulder level for 3 weeks. This gives the device lead wires time to attach securely inside your heart.  Ask your doctor when you can expect to return to work.  You can still exercise. It s good for your body and your heart. Talk with your doctor about an exercise plan.  Other Precautions  Follow your doctor's directions carefully for wound care. You may remove the outer dressing in 3 - 4 days. Leave the steri-strips in place; these will be removed at your 1 week follow-up. Never put any creams, lotions, or products like peroxide on an incision unless your doctor tells you to.   You may shower once the outer dressing has been removed.   Before you receive any treatment, tell all health care providers (including your dentist) that you have a pacemaker.  You will be given an ID card that contains information about your pacemaker. Always carry this card with you. You can show this card if your pacemaker sets off a metal detector. You should also show it to avoid screening with a hand-held security wand.  Keep your cell phone away from your pacemaker. Don t carry the phone in your shirt pocket, even when it s turned off.  Avoid strong magnets. Examples are those used in MRIs or in hand-held security wands.  Avoid strong electrical fields. Examples are those made by radio transmitting towers,  ham  radios, and heavy-duty electrical equipment.  Avoid leaning over the open lyman of a running car. A running engine creates  an electrical field. Most household and yard appliances will not cause any problems. If you use any large power tools, such as an industrial , talk with your doctor.   Follow-Up  Follow up in the device clinic as scheduled. You have an appointment scheduled for 2/28 at 2:30pm. Your appointment is at [facility] in [place], [address] [place] [address], [address], [place] [address].   Make regular follow-up appointments with your device clinic. They will check the pacemaker to make sure it s working properly.  When to Call Your Device Clinic [phone]  Call your doctor immediately if you have any of the following:  Dizziness  Chest pain  Lack of energy  Fainting spells  Twitching chest muscles  Rapid pulse or pounding heartbeat  Shortness of breath  Pain around your pacemaker  Fever above 100.4 F (38 C) or other signs of infection (redness, swelling, drainage, or warmth at the incision site)  Hiccups that won t stop     9315-4852 [employer]. [address] [place] [address]. All rights reserved. This information is not intended as a substitute for professional medical care. Always follow your healthcare professional's instructions.    [facility] in [place]: [phone]  Device Clinic (Monday to Friday, 8am-4pm): [phone]  *The device clinic is closed on weekends and holidays.  Any calls received during this time will be answered on the next business  day. For any urgent questions after hours, please call the main clinic number and you will be put in contact with the cardiologist on call.

## 2023-02-14 NOTE — PROGRESS NOTES
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 Normal function and cxr. Will arrange for device follow up.                      Interval History:   doing well; no cp, sob, n/v/d, or abd pain.              Review of Systems:   As per subjective, otherwise 5 systems reviewed and negative.           Physical Exam:   Blood pressure 113/70, pulse 69, temperature 97.7  F (36.5  C), temperature source Oral, resp. rate 19, height 1.803 m (5' 11\"), weight 83.1 kg (183 lb 3.2 oz), SpO2 97 %.        No intake or output data in the 24 hours ending 02/14/23 1006    Constitutional:   NAD   Skin:   Warm and dry   Head:   Nontraumatic   Neck:   Supple, symmetrical, trachea midline, no adenopathy, thyroid symmetric, not enlarged and no tenderness, skin normal   Lungs:   normal   Cardiovascular:   Normal apical impulse, regular rate and rhythm, normal S1 and S2, no S3 or S4, and no murmur noted    Abdomen:   Benign   Extremities and Back:   Symmetric, no curvature, spinous processes are non-tender on palpation, paraspinous muscles are non-tender on palpation, no costal vertebral tenderness   Neurological:   Grossly nonfocal            Medications:       aspirin  81 mg Oral Daily     ceFAZolin  2 g Intravenous Q8H     doxazosin  2 mg Oral At Bedtime     empagliflozin  10 mg Oral Daily     insulin aspart  1-7 Units Subcutaneous TID AC     insulin aspart  1-5 Units Subcutaneous At Bedtime     lisinopril  10 mg Oral BID     rosuvastatin  10 mg Oral Daily     sodium chloride (PF)  3 mL Intracatheter Q8H     Admission on 02/07/2023, Discharged on 02/08/2023   Component Date Value Ref Range Status     Glucose 02/07/2023 151 (H)  70 - 99 mg/dL Final     Potassium 02/07/2023 4.4  3.4 - 5.3 mmol/L Final     Blood Component Type 02/07/2023 Red Blood Cells   Preliminary     Product Code 02/07/2023 [id number]   " Preliminary     Unit Status 02/07/2023 Returned   Preliminary     Unit Number 02/07/2023 [id number]   Preliminary     CROSSMATCH 02/07/2023 Compatible   Preliminary     CODING SYSTEM 02/07/2023 [id number]   Preliminary     ISSUE DATE AND TIME 02/07/2023 [id number]   Preliminary     UNIT ABO/RH 02/07/2023 A+   Preliminary     UNIT TYPE ISBT 02/07/2023 6200   Preliminary     Blood Component Type 02/07/2023 Red Blood Cells   Preliminary     Product Code 02/07/2023 [id number]   Preliminary     Unit Status 02/07/2023 Returned   Preliminary     Unit Number 02/07/2023 [id number]   Preliminary     CROSSMATCH 02/07/2023 Compatible   Preliminary     CODING SYSTEM 02/07/2023 [id number]   Preliminary     ISSUE DATE AND TIME 02/07/2023 [id number]   Preliminary     UNIT ABO/RH 02/07/2023 A+   Preliminary     UNIT TYPE ISBT 02/07/2023 6200   Preliminary     Activated Clotting Time (Celite) P* 02/07/2023 284 (H)  74 - 150 seconds Final     Activated Clotting Time (Celite) P* 02/07/2023 126  74 - 150 seconds Final     Ventricular Rate 02/07/2023 64  BPM Final     Atrial Rate 02/07/2023 64  BPM Final     OH Interval 02/07/2023 180  ms Final     QRS Duration 02/07/2023 152  ms Final     QT 02/07/2023 450  ms Final     QTc 02/07/2023 464  ms Final     P Axis 02/07/2023 54  degrees Final     R AXIS 02/07/2023 -10  degrees Final     T Axis 02/07/2023 33  degrees Final     Interpretation ECG 02/07/2023    Final                    Value:Sinus rhythm  Right bundle branch block  Abnormal ECG  When compared with ECG of 29-NOV-2022 08:03,  There have been no significant changes  Confirmed by MD GE, MITESH ([id number]),  Jean Claude Hutchinson ([id number]) on 2/9/2023 7:50:21 AM       GLUCOSE BY METER POCT 02/07/2023 118 (H)  70 - 99 mg/dL Final     GLUCOSE BY METER POCT 02/07/2023 93  70 - 99 mg/dL Final     GLUCOSE BY METER POCT 02/07/2023 144 (H)  70 - 99 mg/dL Final     WBC Count 02/08/2023 10.8  4.0 - 11.0 10e3/uL Final     RBC Count  02/08/2023 5.18  4.40 - 5.90 10e6/uL Final     Hemoglobin 02/08/2023 15.2  13.3 - 17.7 g/dL Final     Hematocrit 02/08/2023 46.5  40.0 - 53.0 % Final     MCV 02/08/2023 90  78 - 100 fL Final     MCH 02/08/2023 29.3  26.5 - 33.0 pg Final     MCHC 02/08/2023 32.7  31.5 - 36.5 g/dL Final     RDW 02/08/2023 13.5  10.0 - 15.0 % Final     Platelet Count 02/08/2023 120 (L)  150 - 450 10e3/uL Final     Sodium 02/08/2023 136  136 - 145 mmol/L Final     Potassium 02/08/2023 4.0  3.4 - 5.3 mmol/L Final     Chloride 02/08/2023 99  98 - 107 mmol/L Final     Carbon Dioxide (CO2) 02/08/2023 27  22 - 29 mmol/L Final     Anion Gap 02/08/2023 10  7 - 15 mmol/L Final     Urea Nitrogen 02/08/2023 13.6  8.0 - 23.0 mg/dL Final     Creatinine 02/08/2023 0.73  0.67 - 1.17 mg/dL Final     Calcium 02/08/2023 9.2  8.8 - 10.2 mg/dL Final     Glucose 02/08/2023 142 (H)  70 - 99 mg/dL Final     GFR Estimate 02/08/2023 >90  >60 mL/min/1.73m2 Final    eGFR calculated using 2021 CKD-EPI equation.     Magnesium 02/08/2023 2.0  1.7 - 2.3 mg/dL Final     Phosphorus 02/08/2023 3.7  2.5 - 4.5 mg/dL Final     GLUCOSE BY METER POCT 02/08/2023 142 (H)  70 - 99 mg/dL Final     Ventricular Rate 02/08/2023 84  BPM Final     Atrial Rate 02/08/2023 84  BPM Final     NC Interval 02/08/2023 192  ms Final     QRS Duration 02/08/2023 142  ms Final     QT 02/08/2023 414  ms Final     QTc 02/08/2023 489  ms Final     P Axis 02/08/2023 44  degrees Final     R AXIS 02/08/2023 10  degrees Final     T [place] 02/08/2023 0  degrees Final     Interpretation ECG 02/08/2023    Final                    Value:Sinus rhythm  Right bundle branch block  Possible Inferior infarct , age undetermined  Abnormal ECG  When compared with ECG of 07-FEB-2023 09:20, (unconfirmed)  Borderline criteria for Inferior infarct are now Present  T wave inversion now evident in Inferior leads  Nonspecific T wave abnormality now evident in Anterior leads  Confirmed by MD SAM, FRANCHESKA ([id number]),  [place],  Lex ([id number]) on 2/10/2023 8:04:56 AM       LVEF  02/08/2023 60-65%   Final     GLUCOSE BY METER POCT 02/08/2023 141 (H)  70 - 99 mg/dL Final                  Reuben Farmer MD

## 2023-02-14 NOTE — PLAN OF CARE
Pt here with complete heart block. A&Ox4. Neuros and CMS intact. VSS. Tele A-paced/SR. Mod carb diet. Up independently. Denies pain. Pt scoring green on the Aggression Stop Light Tool. Plan to discharge on 2/14.

## 2023-02-14 NOTE — PROGRESS NOTES
Discharge education was provided. Patient acknowledged understanding of discharge education. He had opportunities to ask questions. He was discharged home.

## 2023-02-14 NOTE — DISCHARGE SUMMARY
[facility]  Hospitalist Discharge Summary      Date of Admission:  2/11/2023  Date of Discharge:  2/14/2023  Discharging Provider: Joseph Salazar DO  Discharge Service: Hospitalist Service    Discharge Diagnoses       Transient complete heart block, symptomatic w/near syncope  H/o AS s/p TAVR on 2/7/2023  HTN/HLP  HFpEF.  Not decompensated   Type 2 diabetes       Follow-ups Needed After Discharge   Follow-up Appointments     Follow-up and recommended labs and tests       Follow up with primary care provider, MICHELLE WATKINS, within 2-4 weeks,   for hospital follow- up. No follow up labs or test are needed.  Follow up with cardiology as recommended           Unresulted Labs Ordered in the Past 30 Days of this Admission     Date and Time Order Name Status Description    2/7/2023  5:42 AM Prepare red blood cells (unit) Preliminary     2/7/2023  5:42 AM Prepare red blood cells (unit) Preliminary         Discharge Disposition   Discharged to home  Condition at discharge: Stable    Hospital Course   Reynaldo Monteiro is a 82 year old male with a  PMH of recent TAVR (2/7/23) who was admitted on 2/11/2023 after he presented with a presyncopal event and found to have transient complete heart block on his Mcot monitor.       Transient complete heart block, symptomatic w/near syncope  H/o AS s/p TAVR on 2/7/2023  HTN/HLP  HFpEF.  Not decompensated    Patient presented with presyncopal episodes while at home, never lost consciousness.  Patient was noted to have transient complete heart block post deployment of his valve during his elective TAVR on 2/7/2023 which resolved.  Patient discharged with a MCOT and was called on 2/10/23 by cardiology to notify him that rhythm strips of hims MCOT demonstrated A-V dissociation with heart rate in the 60s. Patient was asymptomatic at the time.   - Monitor on telemetry  - Holding PTA cardizem 240mg/day.  Cardiology recommended stopping until seen in follow up and  will reassess then  - Had not been on metoprolol following TAVR discharge d/t transient complete heart block post valve deployment  - PTA ASA and statin   - PTA Lisinopril and Doxazosin   - Cardiology following and appreciate their recommendations.  PPM placed without issue.  Cardiology okay with discharge home     Type 2 diabetes   Blood sugars have ranged 106-186  - Continue PTA Jardiance,  - Continue with sliding scale insulin, for now and trend BS      Consultations This Hospital Stay   CARDIOLOGY IP CONSULT  CARDIOLOGY IP CONSULT  ELECTROPHYSIOLOGY IP CONSULT    Code Status   Full Code    Time Spent on this Encounter   I, Joseph Salazar DO, personally saw the patient today and spent greater than 30 minutes discharging this patient.       Joseph Salazar DO  [facility]  [address]  [facility] [address]  Phone: [phone]  ______________________________________________________________________    Physical Exam   Vital Signs: Temp: 97.7  F (36.5  C) Temp src: Oral BP: 113/70 Pulse: 69   Resp: 19 SpO2: 97 % O2 Device: None (Room air)    Weight: 183 lbs 3.2 oz  General Appearance: Resting comfortably. NAD   Respiratory: Clear to auscultation.  No respiratory distress  Cardiovascular: RRR.  No obvious murmurs  GI: Soft.  Non-distended  Skin: No obvious rashes or cyanosis to exposed skin  Other: Alert.  No edema         Primary Care Physician   MICHELLE WATKINS    Discharge Orders      Reason for your hospital stay    Heart block requiring pacemaker placement     Follow-up and recommended labs and tests     Follow up with primary care provider, MICHELLE WATKINS, within 2-4 weeks, for hospital follow- up. No follow up labs or test are needed.  Follow up with cardiology as recommended     Activity    Your activity upon discharge: activity as tolerated     Diet    Follow this diet upon discharge: Orders Placed This Encounter      Moderate Consistent Carb (60 g CHO  per Meal) Diet       Significant Results and Procedures   Most Recent 3 CBC's:Recent Labs   Lab Test 02/12/23  0543 02/11/23  0818 02/08/23  0555   WBC 6.3 7.8 10.8   HGB 14.7 15.6 15.2   MCV 91 89 90   * 120* 120*     Most Recent 3 BMP's:Recent Labs   Lab Test 02/14/23  1105 02/14/23  0830 02/13/23  2138 02/12/23  0831 02/12/23  0543 02/11/23  1755 02/11/23  0818 02/08/23  1207 02/08/23  0555   NA  --   --   --   --  138  --  137  --  136   POTASSIUM  --   --   --   --  4.2  --  4.2  --  4.0   CHLORIDE  --   --   --   --  103  --  97*  --  99   CO2  --   --   --   --  26  --  27  --  27   BUN  --   --   --   --  14.2  --  17.9  --  13.6   CR  --   --   --   --  0.77  --  0.88  --  0.73   ANIONGAP  --   --   --   --  9  --  13  --  10   GIULIANO  --   --   --   --  8.9  --  9.1  --  9.2   * 150* 156*   < > 112*   < > 153*   < > 142*    < > = values in this interval not displayed.   ,   Results for orders placed or performed during the hospital encounter of 02/11/23   XR Chest 2 Views    Narrative    EXAM: XR CHEST 2 VIEWS  LOCATION: [facility]  DATE/TIME: 2/11/2023 8:37 AM    INDICATION: Presyncope, post tavr  COMPARISON: None.      Impression    IMPRESSION:     Electronic device overlies the left upper chest at the level of the anterior left second rib. Catheter deployed valvular prosthesis in aortic position. Normal cardiothoracic ratio. Mediastinal interfaces are well-defined. The right lateral wall of the   ascending aorta is visible on the frontal view which can relate to aortic ectasia.    Symmetric lung inflation. No airspace opacities or findings to suggest interstitial lung edema. Diaphragmatic curvature is preserved. No pleural effusion.    There are small thoracic spine degenerative osteophytes.   X-ray Chest 2 vws*    Narrative    XR CHEST 2 VIEWS  2/14/2023 8:07 AM       INDICATION: Status post pacer/ICD  COMPARISON: 2/11/2023       Impression    IMPRESSION: New  cardiac pacemaker leads in the right atrium and right  ventricle. No pneumothorax. TAVR stable. The lungs are clear.    JAVI OCONNOR MD         SYSTEM ID:  [id number]   EP Device    Narrative    PROCEDURES PERFORMED:  1. Conscious sedation.  2. Cardiac fluoroscopy .  3. Dual-chamber permanent pacemaker implantation.    INDICATION: Symptomatic bradycardia secondary to paroxysmal complete AV   block.    HPI: 82-year old gentleman with a history of hypertension, hyperlipidemia,   diabetes, chronic kidney disease, chronic RBBB pattern EKG and severe   aortic stenosis, who underwent TAVR procedure (2/7/2023) and postop was   complicated by paroxysmal complete AV block.  No reversible causes were   identified, and he was referred for permanent pacemaker implantation.      Risks and benefits of the procedure were reviewed including but not   limited to arrhythmia, pain, infection, bleeding, skin damage from ionized   radiation, kidney damage or allergic reactions to conscious dye, injury to   the neighboring vascular structures, need for emergent cardiopulmonary   resuscitation, heart attack, stroke or death. Alternatives were discussed   including doing nothing. All questions were answered to the patient's   satisfaction. Informed consent was obtained and patient wished to proceed.    FLUOROSCOPY DATA:  Fluoro time:  1.2 minutes.  Radiation dose (AK): 3.29 mGy.  Dose-area product (DAP): 0.457 Gy.cm2.    DESCRIPTION OF PROCEDURE: After written informed consent was obtained,   patient was brought to the EP lab. An intravenous infusion of prophylactic   antibiotic was begun. The chest was sterilely prepped from the level of   iliac crest to level of the chin with antibiotic soap and disinfectant,   draped appropriately. Following infiltration with 1% lidocaine, an   incision was made parallel to and 1 cm beneath the clavicle and extended   across the deltopectoral groove. Using blunt dissection, the excision was   extended  down the anterior pectoral fascia. Using blunt and sharp   dissection, a pocket was developed parallel to the fascia and extended   inferiorly.    Two pocket punctures via fluoroscopy guidance and modified Seldinger   technique was used to attain access into the left subclavian vein. A 6   Greek sheath was inserted over the wire. The right ventricular lead was   advanced under fluoroscopy and a secure location found. The lead was   fixated.  Stimulation and sensing thresholds were found to be   satisfactory. No diaphragmatic stimulation was noticed with high output   pacing. The lead was sutured to the underlying pectoral muscle with   interrupted 0 silk suture over a plastic collar. A 6 Greek sheath was   inserted over the wire. The right atrial lead was advanced under   fluoroscopy and secure location found. The lead was fixated. Stimulation   and sensing thresholds were found to be satisfactory. No diaphragmatic   stimulation was noted with high output pacing. The lead was sutured to the   underlying pectoral muscle with interrupted 0 silk suture over a plastic   collar.    After irrigation with saline solution, the pocket was inspected, no   bleeding was seen. The generator was connected to the leads and inserted   into the pocket. The wound was closed with two layers of subcutaneous   sutures.    PACEMAKER GENERATOR:  Biotronik, model Edora DR-T, serial #[id number].    LEAD INFORMATION:  Right atrial lead: Biotronik, S 53 cm, serial #[id number].  Right ventricular lead: Biotronik, model S 60 cm, serial #[id number].    MEASURED DATA:  Right atrial lead: Sensing 2.7 mV, threshold 0.8 volts at 0.4 msec,   impedance 624 ohms.  Right ventricular lead: Sensing 7.7 mV, threshold 1.0 volt at 0.4 msec,   impedance 741 ohms.    IMPRESSION:  1. Successful dual chamber pacemaker implantation in left infraclavicular   region above the pectoral fascia.  2. Bradycardia pacing set to DDD .    RECOMMENDATIONS:  1. Avoid  vascular access to the left jugular and subclavian veins.  2. Keep wound clean, dry and covered for seven days.  3. PA and lateral chest x-ray to rule out dislodgement.  4. Device interrogation prior to discharge.  5. Left arm in sling overnight and then off in the morning.  6. May start oral medications. Avoid IV or subcutaneous heparin for at   least two weeks. If heparin must be used, please contract the procedural   team to discuss.  7. Wound care as follows:  a) Do not get incision wet for three days.  b) Leave the Steri-Strips in place, allow to come off naturally. If they   do not come off in two weeks, you may remove them.  c) Check incision daily and call if they notice one of the following:   redness, drainage (pus or blood), swelling, warmth or if you develop   fever.    If you have questions regarding wound care, please contact our office.    Felix Pabon MD              Echo Limited     Value    LVEF  55-60%    Narrative    [id number]  [id number]  [id number]  [id number]^CORDELL^MERCEDES     [facility]  Echocardiography Laboratory  [address] [place] [address]     Name: BUTCH MANUEL  MRN: [id number]  : 1940  Study Date: 2023 10:17 AM  Age: 82 yrs  Gender: Male  Patient Location: [facility]  Reason For Study: Syncope  Ordering Physician: MERCEDES LANDA  Referring Physician: MERCEDES LANDA  Performed By: Erik Araiza     BSA: 2.0 m2  Height: 71 in  Weight: 185 lb  HR: 52  ______________________________________________________________________________  Procedure  Limited Echo Adult. Optison (NDC #2156-2881) given intravenously.  ______________________________________________________________________________  Interpretation Summary     Possible 2nd degree AV block.  There is a bioprosthetic aortic valve. ( [address] EOA 1.2cm2/MSG  21mmHg/DI 0.28  Left ventricular systolic function is normal.  The visual ejection fraction is 55-60%.  The left ventricle is normal in  size.  There is mild concentric left ventricular hypertrophy.  The right ventricle is normal in structure, function and size.     There has likely been no signficant change in the gradient across the aortic  vlave prosthesis as compared to 2023, however the rhythm appears to have  changed  ______________________________________________________________________________  Left Ventricle  The left ventricle is normal in size. There is mild concentric left  ventricular hypertrophy. Left ventricular systolic function is normal. The  visual ejection fraction is 55-60%. No regional wall motion abnormalities  noted. There is no thrombus seen in the left ventricle.     Right Ventricle  The right ventricle is normal in structure, function and size.     Aortic Valve  There is trace aortic regurgitation. There is a bioprosthetic aortic valve.     Pulmonic Valve  The pulmonic valve is not well visualized.     Vessels  The aortic root is normal size. Normal size ascending aorta. Inferior vena  cava not well visualized for estimation of right atrial pressure.     Pericardium  The pericardium appears normal. There is no pleural effusion.     Rhythm  Possible 2nd degree AV block.  ______________________________________________________________________________  MMode/2D Measurements & Calculations  asc Aorta Diam: 3.7 cm  LVOT diam: 2.2 cm  LVOT area: 3.6 cm2     Doppler Measurements & Calculations  Ao V2 max: 320.4 cm/sec  Ao max P.0 mmHg  Ao V2 mean: 212.8 cm/sec  Ao mean P.8 mmHg  Ao V2 VTI: 70.0 cm  ZAKI(I,D): 1.1 cm2  ZAKI(V,D): 1.0 cm2  Ao acc time: 0.08 sec  LV V1 max PG: 3.2 mmHg  LV V1 max: 88.8 cm/sec  LV V1 VTI: 20.5 cm  SV(LVOT): 74.7 ml  SI(LVOT): 36.6 ml/m2  TR max jamar: 223.3 cm/sec  TR max P.9 mmHg     AV Jamar Ratio (DI): 0.28  ZAKI Index (cm2/m2): 0.52     ______________________________________________________________________________  Report approved by: Dr. Usama Otero 2023 01:15 PM          Echocardiogram Limited     Value    LVEF  55-60%    [facility]    [id number]  [id number]  [id number]  [id number]^DENISE^DUC^NICOLETTE     [facility]  Echocardiography Laboratory  [address] [place] [address]     Name: BUTCH MANUEL  MRN: [id number]  : 1940  Study Date: 2023 10:06 AM  Age: 82 yrs  Gender: Male  Patient Location: [facility]  Reason For Study: Aortic Valve Disorder  Ordering Physician: DUC WALKER  Referring Physician: Mick Torres  Performed By: Aydee Sena     BSA: 2.0 m2  Height: 71 in  Weight: 183 lb  HR: 46  BP: 157/72 mmHg  ______________________________________________________________________________  Procedure  Limited Portable Echo Adult. Optison (NDC #[id number]) given intravenously.  ______________________________________________________________________________  Interpretation Summary     Left ventricular systolic function is normal.  The visual ejection fraction is 55-60%.  No regional wall motion abnormalities noted.  There is a bioprosthetic aortic valve. ( [address] )  The prosthetic aortic valve is not well visualized.  The mean AoV pressure gradient is 28mmHg.  The gradient is abnormal for this prosthetic aortic valve.  There is mild (1+) aortic regurgitation.  Prior echo from 23 revealed mean gradient across AV of 21mm.  ______________________________________________________________________________  Left Ventricle  Left ventricular diastolic function is indeterminate. Left ventricular  systolic function is normal. The visual ejection fraction is 55-60%. No  regional wall motion abnormalities noted.     Right Ventricle  The right ventricle is normal size. The right ventricular systolic function is  normal.     Mitral Valve  There is trace mitral regurgitation.     Tricuspid Valve  No tricuspid regurgitation. Right ventricular systolic pressure could not be  approximated due to inadequate tricuspid regurgitation.     Aortic Valve  There is  mild (1+) aortic regurgitation. The mean AoV pressure gradient is  28mmHg. There is a bioprosthetic aortic valve. ( [address] ). The  prosthetic aortic valve is not well visualized. The gradient is abnormal for  this prosthetic aortic valve.     Pulmonic Valve  The pulmonic valve is not well visualized.     Vessels  The aortic root is normal size.     Pericardium  There is no pericardial effusion.     Rhythm  [place]ycadia, possible AV block. The rhythm was undetermined.     ______________________________________________________________________________  MMode/2D Measurements & Calculations  LVOT diam: 2.2 cm  LVOT area: 3.9 cm2     Doppler Measurements & Calculations  Ao V2 max: 357.0 cm/sec  Ao max P.0 mmHg  Ao V2 mean: 252.1 cm/sec  Ao mean P.1 mmHg  Ao V2 VTI: 73.7 cm  ZAKI(I,D): 1.3 cm2  ZAKI(V,D): 1.4 cm2     Ao acc time: 0.08 sec  LV V1 max P.4 mmHg  LV V1 max: 126.8 cm/sec  LV V1 VTI: 24.5 cm  SV(LVOT): 95.3 ml  SI(LVOT): 46.9 ml/m2  AV Jamar Ratio (DI): 0.36  ZAKI Index (cm2/m2): 0.64     ______________________________________________________________________________  Report approved by: Silvia Chávez 2023 10:52 AM               Discharge Medications   Current Discharge Medication List      CONTINUE these medications which have CHANGED    Details   lisinopril (ZESTRIL) 10 MG tablet Take 1 tablet (10 mg) by mouth 2 times daily  Qty: 60 tablet, Refills: 0    Comments: Future refills by PCP Dr. MICHELLE WATKINS with phone number [phone].  Associated Diagnoses: Benign essential hypertension         CONTINUE these medications which have NOT CHANGED    Details   aspirin (ASA) 81 MG EC tablet Take 1 tablet (81 mg) by mouth daily  Qty: 90 tablet, Refills: 3    Associated Diagnoses: Aortic stenosis, severe      doxazosin (CARDURA) 4 MG tablet Take 2 mg by mouth At Bedtime (4MG X 0.5 = 2MG)      empagliflozin (JARDIANCE) 10 MG TABS tablet Take 10 mg by mouth daily      glucosamine 500 MG CAPS  capsule Take 500 mg by mouth daily      Multiple Vitamins-Minerals (ZINC PO) Take 1 tablet by mouth daily Patient unsure of dose      polyethylene glycol (MIRALAX) 17 GM/Dose powder Take 1 capful by mouth daily as needed for constipation      rosuvastatin (CRESTOR) 20 MG tablet Take 10 mg by mouth daily (20MG X 0.5 = 10MG)      vitamin C with B complex (B COMPLEX-C) tablet Take 1 tablet by mouth daily      vitamin D3 (CHOLECALCIFEROL) 50 mcg (2000 units) tablet Take 1 tablet by mouth daily      vitamin E 400 UNIT capsule Take 400 Units by mouth daily         STOP taking these medications       diltiazem ER COATED BEADS (CARDIZEM CD/CARTIA XT) 240 MG 24 hr capsule Comments:   Reason for Stopping:             Allergies   No Known Allergies

## 2023-02-15 ENCOUNTER — TELEPHONE (OUTPATIENT)
Dept: CARDIOLOGY | Facility: CLINIC | Age: 83
End: 2023-02-15

## 2023-02-15 DIAGNOSIS — I48.91 ATRIAL FIBRILLATION (H): Primary | ICD-10-CM

## 2023-02-16 ENCOUNTER — PATIENT OUTREACH (OUTPATIENT)
Dept: CARE COORDINATION | Facility: CLINIC | Age: 83
End: 2023-02-16
Payer: MEDICARE

## 2023-02-16 NOTE — PROGRESS NOTES
Clinic Care Coordination Contact  [facility]/Voicemail       Clinical Data: Care Coordinator Outreach  Outreach attempted x 2.  Left message on patient's voicemail with call back information and requested return call.  Plan:  Care Coordinator will do no further outreaches at this time.    Kisha PICKARD Community Health Worker  Clinic Care Coordination  [facility]  Phone: [phone]

## 2023-02-17 LAB
ATRIAL RATE - MUSE: 77 BPM
DIASTOLIC BLOOD PRESSURE - MUSE: NORMAL MMHG
INTERPRETATION ECG - MUSE: NORMAL
P AXIS - MUSE: 11 DEGREES
PR INTERVAL - MUSE: 182 MS
QRS DURATION - MUSE: 170 MS
QT - MUSE: 474 MS
QTC - MUSE: 536 MS
R AXIS - MUSE: -62 DEGREES
SYSTOLIC BLOOD PRESSURE - MUSE: NORMAL MMHG
T AXIS - MUSE: 95 DEGREES
VENTRICULAR RATE- MUSE: 77 BPM

## 2023-02-24 ENCOUNTER — TELEPHONE (OUTPATIENT)
Dept: CARDIOLOGY | Facility: CLINIC | Age: 83
End: 2023-02-24
Payer: MEDICARE

## 2023-02-24 NOTE — TELEPHONE ENCOUNTER
Health Call Center    Phone Message    May a detailed message be left on voicemail: no     Reason for Call: Other: Reynaldo called to advise he has had a recent cardiac procedure and during that visit he was told he could not use aspirin. He would like clarification on what he is able to take for headaches. Please reach out to Reynaldo at [phone].     Action Taken: Other: [facility]    Travel Screening: Not Applicable

## 2023-02-24 NOTE — TELEPHONE ENCOUNTER
Patient reports that he has been experiencing increased headaches, he feels these have increased since his pacemaker and lead implant.  Patient also reports sharp pain across his eyes with these headaches.  Patient has not been monitoring BP while at home, RN did have patient check his BP while on the phone with RN.  BP read 138/78.  RN advised patient that he can try Tylenol for headache.  If headaches persist patient advised to be seen by PCP or ED.  Patient verbalized understanding.   Alpa Mcintosh RN on 2/24/2023 at 2:54 PM

## 2023-02-28 ENCOUNTER — OFFICE VISIT (OUTPATIENT)
Dept: CARDIOLOGY | Facility: CLINIC | Age: 83
End: 2023-02-28
Payer: MEDICARE

## 2023-02-28 ENCOUNTER — ANCILLARY PROCEDURE (OUTPATIENT)
Dept: CARDIOLOGY | Facility: CLINIC | Age: 83
End: 2023-02-28
Attending: INTERNAL MEDICINE
Payer: MEDICARE

## 2023-02-28 VITALS
BODY MASS INDEX: 26.63 KG/M2 | OXYGEN SATURATION: 94 % | WEIGHT: 186 LBS | DIASTOLIC BLOOD PRESSURE: 78 MMHG | SYSTOLIC BLOOD PRESSURE: 114 MMHG | HEART RATE: 72 BPM | HEIGHT: 70 IN

## 2023-02-28 DIAGNOSIS — Z95.0 CARDIAC PACEMAKER IN SITU: ICD-10-CM

## 2023-02-28 DIAGNOSIS — I10 BENIGN ESSENTIAL HYPERTENSION: ICD-10-CM

## 2023-02-28 PROCEDURE — 99214 OFFICE O/P EST MOD 30 MIN: CPT | Mod: 24 | Performed by: NURSE PRACTITIONER

## 2023-02-28 PROCEDURE — 93280 PM DEVICE PROGR EVAL DUAL: CPT | Performed by: INTERNAL MEDICINE

## 2023-02-28 RX ORDER — TADALAFIL 20 MG/1
20 TABLET ORAL DAILY PRN
COMMUNITY

## 2023-02-28 RX ORDER — LISINOPRIL 10 MG/1
10 TABLET ORAL 2 TIMES DAILY
Qty: 180 TABLET | Refills: 3 | Status: SHIPPED | OUTPATIENT
Start: 2023-02-28 | End: 2023-08-30

## 2023-02-28 NOTE — PROGRESS NOTES
Electrophysiology Clinic Progress Note  Reynaldo Monteiro MRN# [id number]   YOB: 1940 Age: 82 year old     Primary cardiologist: Dr. Villatoro    Reason for visit: Discharge follow up    History of presenting illness:    Reynaldo Monteiro is a pleasant 82 year old patient with past medical history significant for:    1. Paroxysmal complete AV block  2. Paroxysmal atrial fibrillation  3. Severe aortic stenosis status post TAVR with a 29 mm Villanueva FRIDA 3 valve  4. Chronic diastolic heart failure  5. Chronic right bundle branch block  6. Hypertension  7. Hyperlipidemia  8. Type 2 diabetes    The patient underwent a TAVR on 2/7/2023 with a 29 mm Villanueva FRIDA 3 valve that was complicated by paroxysmal complete AV block.  AV conduction and eventually improved postprocedure and was discharged home on a monitor.  The monitor showed episodes of paroxysmal complete AV block and he was brought into the hospital for consideration of a permanent pacemaker.  On 2/13/2023 he underwent successful placement of a dual-chamber Biotronik permanent pacemaker.    The device clinic received an alert on 2/15/2023 noting atrial arrhythmias lasting 1.5 hours with rates in the 70s.  He was started on Eliquis and discontinued aspirin.  His most recent device check was from today noting that he was in sinus rhythm at the time of the check.  He did have 11 mode switches comprising 11% of the time with the longest episode lasting more than a day with controlled ventricular response.    Today he is following up from his discharge and for new diagnosis of paroxysmal atrial fibrillation.  He is tolerating Eliquis well without concerns and overall has symptomatic improvement since his TAVR.              Assessment and Plan:     ASSESSMENT:    1. Paroxysmal complete heart block    Status post dual-chamber Biotronik permanent pacemaker on 2/13/2023    2. Paroxysmal atrial fibrillation    [id number] score 5 (age++, hypertension,  "diabetes, history of heart failure)    Currently anticoagulated on Eliquis 5 mg twice daily    3. Severe arctic stenosis    Status post TAVR 2/7/2023    4. Hypertnesion    Well-controlled    PLAN:     1. Follow-up with structural heart as scheduled  2. No change in medical therapy  3. Follow-up with device clinic quarterly remotely with annual in person appointment.       Orders this Visit:  No orders of the defined types were placed in this encounter.    Orders Placed This Encounter   Medications     tadalafil (CIALIS) 20 MG tablet     Sig: Take 20 mg by mouth daily as needed     lisinopril (ZESTRIL) 10 MG tablet     Sig: Take 1 tablet (10 mg) by mouth 2 times daily     Dispense:  180 tablet     Refill:  3     Medications Discontinued During This Encounter   Medication Reason     lisinopril (ZESTRIL) 10 MG tablet Reorder (No AVS / No eCancel)       Today's clinic visit entailed:  Review of the result(s) of each unique test - Echo, device check, EKG  Ordering of each unique test  Prescription drug management  20 minutes spent on the date of the encounter doing chart review, history and exam, documentation and further activities per the note  Provider  Link to [facility] Help Grid     The level of medical decision making during this visit was of moderate complexity.           Review of Systems:     Review of Systems:  Skin:        Eyes:       ENT:       Respiratory:  Negative    Cardiovascular:       Gastroenterology:      Genitourinary:       Musculoskeletal:       Neurologic:       Psychiatric:       Heme/Lymph/Imm:       Endocrine:  Positive for diabetes            Physical Exam:     Vitals: /78   Pulse 72   Ht 1.778 m (5' 10\")   Wt 84.4 kg (186 lb)   SpO2 94%   BMI 26.69 kg/m    Constitutional: Well nourished and in no apparent distress.  Eyes: Pupils equal, round. Sclerae anicteric.   HEENT: Normocephalic, atraumatic.   Neck: Supple. JVD   Respiratory: Breathing non-labored. Lungs clear to auscultation " bilaterally. No crackles, wheezes, rhonchi, or rales.  Cardiovascular:  Regular rate and rhythm, normal S1 and S2. No murmur, rub, or gallop.  PPM site well-healed  Skin: Warm, dry. No rashes, cyanosis, or xanthelasma.  Extremities: No edema.  Neurologic: No gross motor deficits. Alert, awake, and oriented to person, place and time.  Psychiatric: Affect appropriate.        CURRENT MEDICATIONS:  Current Outpatient Medications   Medication Sig Dispense Refill     apixaban ANTICOAGULANT (ELIQUIS ANTICOAGULANT) 5 MG tablet Take 1 tablet (5 mg) by mouth 2 times daily 180 tablet 4     doxazosin (CARDURA) 4 MG tablet Take 2 mg by mouth At Bedtime (4MG X 0.5 = 2MG)       empagliflozin (JARDIANCE) 10 MG TABS tablet Take 10 mg by mouth daily       glucosamine 500 MG CAPS capsule Take 500 mg by mouth daily       lisinopril (ZESTRIL) 10 MG tablet Take 1 tablet (10 mg) by mouth 2 times daily 180 tablet 3     Multiple Vitamins-Minerals (ZINC PO) Take 1 tablet by mouth daily Patient unsure of dose       polyethylene glycol (MIRALAX) 17 GM/Dose powder Take 1 capful by mouth daily as needed for constipation       rosuvastatin (CRESTOR) 20 MG tablet Take 10 mg by mouth daily (20MG X 0.5 = 10MG)       tadalafil (CIALIS) 20 MG tablet Take 20 mg by mouth daily as needed       vitamin C with B complex (B COMPLEX-C) tablet Take 1 tablet by mouth daily       vitamin D3 (CHOLECALCIFEROL) 50 mcg (2000 units) tablet Take 1 tablet by mouth daily       vitamin E 400 UNIT capsule Take 400 Units by mouth daily         ALLERGIES  No Known Allergies      PAST MEDICAL HISTORY:  Past Medical History:   Diagnosis Date     Aortic valve stenosis      Esophagitis, reflux      Hyperlipidemia      Hypertension      Lumbar disc disorder with myelopathy     bilateral leg weakness     RBBB (right bundle branch block)      S/p TAVR (transcatheter aortic valve replacement), bioprosthetic 2/7/2023     Severe aortic valve stenosis      Type 2 diabetes mellitus  (H)        PAST SURGICAL HISTORY:  Past Surgical History:   Procedure Laterality Date     ANGIOGRAM      Patient reports coronary angiogram at [place], [place] approx 10 years ago     CV CORONARY ANGIOGRAM N/A 11/29/2022    Procedure: Coronary Angiogram;  Surgeon: Edwin Meadows MD;  Location: [facility] CARDIAC CATH LAB     CV TRANSCATHETER AORTIC VALVE REPLACEMENT-FEMORAL APPROACH N/A 2/7/2023    Procedure: Transcatheter Aortic Valve Replacement-Femoral Approach;  Surgeon: Juan Jang MD;  Location: [facility] CARDIAC CATH LAB     EP PACEMAKER DEVICE & LEAD IMPLANT- RIGHT ATRIAL & RIGHT VENTRICULAR N/A 2/13/2023    Procedure: Pacemaker Device & Lead Implant - Right Atrial & Right Ventricular;  Surgeon: Felix Pabon MD;  Location:  HEART CARDIAC CATH LAB       FAMILY HISTORY:  Family History   Problem Relation Age of Onset     Hypertension Mother         valve replacement      Coronary Artery Disease Father        SOCIAL HISTORY:  Social History     Socioeconomic History     Marital status:      Spouse name: Karissa     Number of children: None     Years of education: None     Highest education level: None   Occupational History     Occupation: Small business owner   Tobacco Use     Smoking status: Never     Smokeless tobacco: Never   Substance and Sexual Activity     Alcohol use: Yes     Alcohol/week: 4.0 standard drinks     Types: 4 Drinks containing 0.5 oz of alcohol per week     Comment: 1-2 a week      Drug use: Never

## 2023-02-28 NOTE — LETTER
2/28/2023    MICHELLE WATKINS  [facility] [address]  [facility] [address]    RE: Reynaldo Monteiro       Dear Colleague,     I had the pleasure of seeing Reynaldo Monteiro in the [facility].    Electrophysiology Clinic Progress Note  Reynaldo Monteiro MRN# [id number]   YOB: 1940 Age: 82 year old     Primary cardiologist: Dr. Villatoro    Reason for visit: Discharge follow up    History of presenting illness:    Reynaldo Monteiro is a pleasant 82 year old patient with past medical history significant for:    1. Paroxysmal complete AV block  2. Paroxysmal atrial fibrillation  3. Severe aortic stenosis status post TAVR with a 29 mm Villanueva FRIDA 3 valve  4. Chronic diastolic heart failure  5. Chronic right bundle branch block  6. Hypertension  7. Hyperlipidemia  8. Type 2 diabetes    The patient underwent a TAVR on 2/7/2023 with a 29 mm Villanueva FRIDA 3 valve that was complicated by paroxysmal complete AV block.  AV conduction and eventually improved postprocedure and was discharged home on a monitor.  The monitor showed episodes of paroxysmal complete AV block and he was brought into the hospital for consideration of a permanent pacemaker.  On 2/13/2023 he underwent successful placement of a dual-chamber Biotronik permanent pacemaker.    The device clinic received an alert on 2/15/2023 noting atrial arrhythmias lasting 1.5 hours with rates in the 70s.  He was started on Eliquis and discontinued aspirin.  His most recent device check was from today noting that he was in sinus rhythm at the time of the check.  He did have 11 mode switches comprising 11% of the time with the longest episode lasting more than a day with controlled ventricular response.    Today he is following up from his discharge and for new diagnosis of paroxysmal atrial fibrillation.  He is tolerating Eliquis well without concerns and overall has symptomatic improvement since his TAVR.              Assessment and  "Plan:     ASSESSMENT:    1. Paroxysmal complete heart block    Status post dual-chamber Biotronik permanent pacemaker on 2/13/2023    2. Paroxysmal atrial fibrillation    [id number] score 5 (age++, hypertension, diabetes, history of heart failure)    Currently anticoagulated on Eliquis 5 mg twice daily    3. Severe arctic stenosis    Status post TAVR 2/7/2023    4. Hypertnesion    Well-controlled    PLAN:     1. Follow-up with structural heart as scheduled  2. No change in medical therapy  3. Follow-up with device clinic quarterly remotely with annual in person appointment.       Orders this Visit:  No orders of the defined types were placed in this encounter.    Orders Placed This Encounter   Medications     tadalafil (CIALIS) 20 MG tablet     Sig: Take 20 mg by mouth daily as needed     lisinopril (ZESTRIL) 10 MG tablet     Sig: Take 1 tablet (10 mg) by mouth 2 times daily     Dispense:  180 tablet     Refill:  3     Medications Discontinued During This Encounter   Medication Reason     lisinopril (ZESTRIL) 10 MG tablet Reorder (No AVS / No eCancel)       Today's clinic visit entailed:  Review of the result(s) of each unique test - Echo, device check, EKG  Ordering of each unique test  Prescription drug management  20 minutes spent on the date of the encounter doing chart review, history and exam, documentation and further activities per the note  Provider  Link to [facility] Help Grid     The level of medical decision making during this visit was of moderate complexity.           Review of Systems:     Review of Systems:  Skin:        Eyes:       ENT:       Respiratory:  Negative    Cardiovascular:       Gastroenterology:      Genitourinary:       Musculoskeletal:       Neurologic:       Psychiatric:       Heme/Lymph/Imm:       Endocrine:  Positive for diabetes            Physical Exam:     Vitals: /78   Pulse 72   Ht 1.778 m (5' 10\")   Wt 84.4 kg (186 lb)   SpO2 94%   BMI 26.69 kg/m    Constitutional: " Well nourished and in no apparent distress.  Eyes: Pupils equal, round. Sclerae anicteric.   HEENT: Normocephalic, atraumatic.   Neck: Supple. JVD   Respiratory: Breathing non-labored. Lungs clear to auscultation bilaterally. No crackles, wheezes, rhonchi, or rales.  Cardiovascular:  Regular rate and rhythm, normal S1 and S2. No murmur, rub, or gallop.  PPM site well-healed  Skin: Warm, dry. No rashes, cyanosis, or xanthelasma.  Extremities: No edema.  Neurologic: No gross motor deficits. Alert, awake, and oriented to person, place and time.  Psychiatric: Affect appropriate.        CURRENT MEDICATIONS:  Current Outpatient Medications   Medication Sig Dispense Refill     apixaban ANTICOAGULANT (ELIQUIS ANTICOAGULANT) 5 MG tablet Take 1 tablet (5 mg) by mouth 2 times daily 180 tablet 4     doxazosin (CARDURA) 4 MG tablet Take 2 mg by mouth At Bedtime (4MG X 0.5 = 2MG)       empagliflozin (JARDIANCE) 10 MG TABS tablet Take 10 mg by mouth daily       glucosamine 500 MG CAPS capsule Take 500 mg by mouth daily       lisinopril (ZESTRIL) 10 MG tablet Take 1 tablet (10 mg) by mouth 2 times daily 180 tablet 3     Multiple Vitamins-Minerals (ZINC PO) Take 1 tablet by mouth daily Patient unsure of dose       polyethylene glycol (MIRALAX) 17 GM/Dose powder Take 1 capful by mouth daily as needed for constipation       rosuvastatin (CRESTOR) 20 MG tablet Take 10 mg by mouth daily (20MG X 0.5 = 10MG)       tadalafil (CIALIS) 20 MG tablet Take 20 mg by mouth daily as needed       vitamin C with B complex (B COMPLEX-C) tablet Take 1 tablet by mouth daily       vitamin D3 (CHOLECALCIFEROL) 50 mcg (2000 units) tablet Take 1 tablet by mouth daily       vitamin E 400 UNIT capsule Take 400 Units by mouth daily         ALLERGIES  No Known Allergies      PAST MEDICAL HISTORY:  Past Medical History:   Diagnosis Date     Aortic valve stenosis      Esophagitis, reflux      Hyperlipidemia      Hypertension      Lumbar disc disorder with  myelopathy     bilateral leg weakness     RBBB (right bundle branch block)      S/p TAVR (transcatheter aortic valve replacement), bioprosthetic 2/7/2023     Severe aortic valve stenosis      Type 2 diabetes mellitus (H)        PAST SURGICAL HISTORY:  Past Surgical History:   Procedure Laterality Date     ANGIOGRAM      Patient reports coronary angiogram at [place], [place] approx 10 years ago     CV CORONARY ANGIOGRAM N/A 11/29/2022    Procedure: Coronary Angiogram;  Surgeon: Edwin Meadows MD;  Location:  HEART CARDIAC CATH LAB     CV TRANSCATHETER AORTIC VALVE REPLACEMENT-FEMORAL APPROACH N/A 2/7/2023    Procedure: Transcatheter Aortic Valve Replacement-Femoral Approach;  Surgeon: Juan Jang MD;  Location:  HEART CARDIAC CATH LAB     EP PACEMAKER DEVICE & LEAD IMPLANT- RIGHT ATRIAL & RIGHT VENTRICULAR N/A 2/13/2023    Procedure: Pacemaker Device & Lead Implant - Right Atrial & Right Ventricular;  Surgeon: Felix Pabon MD;  Location:  HEART CARDIAC CATH LAB       FAMILY HISTORY:  Family History   Problem Relation Age of Onset     Hypertension Mother         valve replacement      Coronary Artery Disease Father        SOCIAL HISTORY:  Social History     Socioeconomic History     Marital status:      Spouse name: Karissa     Number of children: None     Years of education: None     Highest education level: None   Occupational History     Occupation: Small business owner   Tobacco Use     Smoking status: Never     Smokeless tobacco: Never   Substance and Sexual Activity     Alcohol use: Yes     Alcohol/week: 4.0 standard drinks     Types: 4 Drinks containing 0.5 oz of alcohol per week     Comment: 1-2 a week      Drug use: Never          Thank you for allowing me to participate in the care of your patient.      Sincerely,     CARA PEREZ, [facility]  cc:   No referring provider defined for this  encounter.

## 2023-03-03 LAB
MDC_IDC_LEAD_IMPLANT_DT: NORMAL
MDC_IDC_LEAD_IMPLANT_DT: NORMAL
MDC_IDC_LEAD_LOCATION: NORMAL
MDC_IDC_LEAD_LOCATION: NORMAL
MDC_IDC_LEAD_LOCATION_DETAIL_1: NORMAL
MDC_IDC_LEAD_LOCATION_DETAIL_1: NORMAL
MDC_IDC_LEAD_MFG: NORMAL
MDC_IDC_LEAD_MFG: NORMAL
MDC_IDC_LEAD_MODEL: NORMAL
MDC_IDC_LEAD_MODEL: NORMAL
MDC_IDC_LEAD_POLARITY_TYPE: NORMAL
MDC_IDC_LEAD_POLARITY_TYPE: NORMAL
MDC_IDC_LEAD_SERIAL: NORMAL
MDC_IDC_LEAD_SERIAL: NORMAL
MDC_IDC_MSMT_BATTERY_STATUS: NORMAL
MDC_IDC_MSMT_LEADCHNL_RA_IMPEDANCE_VALUE: 604 OHM
MDC_IDC_MSMT_LEADCHNL_RA_IMPEDANCE_VALUE: 604 OHM
MDC_IDC_MSMT_LEADCHNL_RA_PACING_THRESHOLD_AMPLITUDE: 0.8 V
MDC_IDC_MSMT_LEADCHNL_RA_PACING_THRESHOLD_AMPLITUDE: 0.8 V
MDC_IDC_MSMT_LEADCHNL_RA_PACING_THRESHOLD_PULSEWIDTH: 0.4 MS
MDC_IDC_MSMT_LEADCHNL_RA_PACING_THRESHOLD_PULSEWIDTH: 0.4 MS
MDC_IDC_MSMT_LEADCHNL_RA_SENSING_INTR_AMPL: 6.7 MV
MDC_IDC_MSMT_LEADCHNL_RA_SENSING_INTR_AMPL: 6.9 MV
MDC_IDC_MSMT_LEADCHNL_RV_IMPEDANCE_VALUE: 585 OHM
MDC_IDC_MSMT_LEADCHNL_RV_IMPEDANCE_VALUE: 604 OHM
MDC_IDC_MSMT_LEADCHNL_RV_PACING_THRESHOLD_AMPLITUDE: 0.6 V
MDC_IDC_MSMT_LEADCHNL_RV_PACING_THRESHOLD_AMPLITUDE: 0.6 V
MDC_IDC_MSMT_LEADCHNL_RV_PACING_THRESHOLD_AMPLITUDE: 0.8 V
MDC_IDC_MSMT_LEADCHNL_RV_PACING_THRESHOLD_PULSEWIDTH: 0.4 MS
MDC_IDC_MSMT_LEADCHNL_RV_SENSING_INTR_AMPL: 10.5 MV
MDC_IDC_MSMT_LEADCHNL_RV_SENSING_INTR_AMPL: 10.8 MV
MDC_IDC_MSMT_LEADCHNL_RV_SENSING_INTR_AMPL: 9.7 MV
MDC_IDC_MSMT_LEADCHNL_RV_SENSING_INTR_AMPL: 9.7 MV
MDC_IDC_PG_IMPLANT_DTM: NORMAL
MDC_IDC_PG_MFG: NORMAL
MDC_IDC_PG_MODEL: NORMAL
MDC_IDC_PG_SERIAL: NORMAL
MDC_IDC_PG_TYPE: NORMAL
MDC_IDC_SESS_CLINIC_NAME: NORMAL
MDC_IDC_SESS_DTM: NORMAL
MDC_IDC_SESS_REPROGRAMMED: NORMAL
MDC_IDC_SESS_TYPE: NORMAL
MDC_IDC_SET_BRADY_AT_MODE_SWITCH_MODE: NORMAL
MDC_IDC_SET_BRADY_AT_MODE_SWITCH_RATE: 180 {BEATS}/MIN
MDC_IDC_SET_BRADY_HYSTRATE: 60 {BEATS}/MIN
MDC_IDC_SET_BRADY_LOWRATE: 60 {BEATS}/MIN
MDC_IDC_SET_BRADY_MAX_SENSOR_RATE: 120 {BEATS}/MIN
MDC_IDC_SET_BRADY_MAX_TRACKING_RATE: 130 {BEATS}/MIN
MDC_IDC_SET_BRADY_MODE: NORMAL
MDC_IDC_SET_BRADY_NIGHT_RATE: 60 {BEATS}/MIN
MDC_IDC_SET_BRADY_PAV_DELAY_HIGH: 160 MS
MDC_IDC_SET_BRADY_PAV_DELAY_LOW: 200 MS
MDC_IDC_SET_BRADY_SAV_DELAY_HIGH: 130 MS
MDC_IDC_SET_BRADY_SAV_DELAY_LOW: 170 MS
MDC_IDC_SET_CRT_PACED_CHAMBERS: NORMAL
MDC_IDC_SET_LEADCHNL_LV_PACING_CATHODE_ELECTRODE_1: NORMAL
MDC_IDC_SET_LEADCHNL_LV_PACING_CATHODE_LOCATION_1: NORMAL
MDC_IDC_SET_LEADCHNL_LV_PACING_POLARITY: NORMAL
MDC_IDC_SET_LEADCHNL_LV_SENSING_CATHODE_ELECTRODE_1: NORMAL
MDC_IDC_SET_LEADCHNL_LV_SENSING_CATHODE_LOCATION_1: NORMAL
MDC_IDC_SET_LEADCHNL_LV_SENSING_POLARITY: NORMAL
MDC_IDC_SET_LEADCHNL_RA_PACING_AMPLITUDE: 3 V
MDC_IDC_SET_LEADCHNL_RA_PACING_POLARITY: NORMAL
MDC_IDC_SET_LEADCHNL_RA_PACING_PULSEWIDTH: 0.4 MS
MDC_IDC_SET_LEADCHNL_RA_SENSING_ADAPTATION_MODE: NORMAL
MDC_IDC_SET_LEADCHNL_RA_SENSING_POLARITY: NORMAL
MDC_IDC_SET_LEADCHNL_RA_SENSING_SENSITIVITY: NORMAL
MDC_IDC_SET_LEADCHNL_RV_PACING_AMPLITUDE: 1.3 V
MDC_IDC_SET_LEADCHNL_RV_PACING_POLARITY: NORMAL
MDC_IDC_SET_LEADCHNL_RV_PACING_PULSEWIDTH: 0.4 MS
MDC_IDC_SET_LEADCHNL_RV_SENSING_ADAPTATION_MODE: NORMAL
MDC_IDC_SET_LEADCHNL_RV_SENSING_POLARITY: NORMAL
MDC_IDC_SET_LEADCHNL_RV_SENSING_SENSITIVITY: NORMAL

## 2023-03-15 ENCOUNTER — LAB (OUTPATIENT)
Dept: LAB | Facility: CLINIC | Age: 83
End: 2023-03-15
Attending: INTERNAL MEDICINE
Payer: MEDICARE

## 2023-03-15 ENCOUNTER — HOSPITAL ENCOUNTER (OUTPATIENT)
Dept: CARDIOLOGY | Facility: CLINIC | Age: 83
Discharge: HOME | End: 2023-03-15
Attending: INTERNAL MEDICINE | Admitting: INTERNAL MEDICINE
Payer: MEDICARE

## 2023-03-15 ENCOUNTER — OFFICE VISIT (OUTPATIENT)
Dept: CARDIOLOGY | Facility: CLINIC | Age: 83
End: 2023-03-15
Attending: INTERNAL MEDICINE
Payer: MEDICARE

## 2023-03-15 VITALS
HEART RATE: 70 BPM | DIASTOLIC BLOOD PRESSURE: 80 MMHG | HEIGHT: 70 IN | BODY MASS INDEX: 26.8 KG/M2 | SYSTOLIC BLOOD PRESSURE: 136 MMHG | OXYGEN SATURATION: 97 % | WEIGHT: 187.2 LBS

## 2023-03-15 DIAGNOSIS — Z95.3 S/P TAVR (TRANSCATHETER AORTIC VALVE REPLACEMENT), BIOPROSTHETIC: Primary | ICD-10-CM

## 2023-03-15 DIAGNOSIS — Z95.3 S/P TAVR (TRANSCATHETER AORTIC VALVE REPLACEMENT), BIOPROSTHETIC: ICD-10-CM

## 2023-03-15 LAB
ANION GAP SERPL CALCULATED.3IONS-SCNC: 9 MMOL/L (ref 7–15)
BUN SERPL-MCNC: 12.8 MG/DL (ref 8–23)
CALCIUM SERPL-MCNC: 9.3 MG/DL (ref 8.8–10.2)
CHLORIDE SERPL-SCNC: 106 MMOL/L (ref 98–107)
CREAT SERPL-MCNC: 0.8 MG/DL (ref 0.67–1.17)
DEPRECATED HCO3 PLAS-SCNC: 27 MMOL/L (ref 22–29)
ERYTHROCYTE [DISTWIDTH] IN BLOOD BY AUTOMATED COUNT: 14.2 % (ref 10–15)
GFR SERPL CREATININE-BSD FRML MDRD: 89 ML/MIN/1.73M2
GLUCOSE SERPL-MCNC: 107 MG/DL (ref 70–99)
HCT VFR BLD AUTO: 47.2 % (ref 40–53)
HGB BLD-MCNC: 15.4 G/DL (ref 13.3–17.7)
LVEF ECHO: NORMAL
MCH RBC QN AUTO: 29.6 PG (ref 26.5–33)
MCHC RBC AUTO-ENTMCNC: 32.6 G/DL (ref 31.5–36.5)
MCV RBC AUTO: 91 FL (ref 78–100)
PLATELET # BLD AUTO: 129 10E3/UL (ref 150–450)
POTASSIUM SERPL-SCNC: 4.3 MMOL/L (ref 3.4–5.3)
RBC # BLD AUTO: 5.2 10E6/UL (ref 4.4–5.9)
SODIUM SERPL-SCNC: 142 MMOL/L (ref 136–145)
WBC # BLD AUTO: 5.5 10E3/UL (ref 4–11)

## 2023-03-15 PROCEDURE — 99214 OFFICE O/P EST MOD 30 MIN: CPT | Mod: 24 | Performed by: NURSE PRACTITIONER

## 2023-03-15 PROCEDURE — 93321 DOPPLER ECHO F-UP/LMTD STD: CPT | Mod: 26 | Performed by: INTERNAL MEDICINE

## 2023-03-15 PROCEDURE — 93325 DOPPLER ECHO COLOR FLOW MAPG: CPT | Mod: 26 | Performed by: INTERNAL MEDICINE

## 2023-03-15 PROCEDURE — 85027 COMPLETE CBC AUTOMATED: CPT | Performed by: INTERNAL MEDICINE

## 2023-03-15 PROCEDURE — 93321 DOPPLER ECHO F-UP/LMTD STD: CPT

## 2023-03-15 PROCEDURE — 80048 BASIC METABOLIC PNL TOTAL CA: CPT | Performed by: INTERNAL MEDICINE

## 2023-03-15 PROCEDURE — 93325 DOPPLER ECHO COLOR FLOW MAPG: CPT

## 2023-03-15 PROCEDURE — 36415 COLL VENOUS BLD VENIPUNCTURE: CPT | Performed by: INTERNAL MEDICINE

## 2023-03-15 PROCEDURE — 93308 TTE F-UP OR LMTD: CPT | Mod: 26 | Performed by: INTERNAL MEDICINE

## 2023-03-15 PROCEDURE — 93000 ELECTROCARDIOGRAM COMPLETE: CPT | Mod: 59 | Performed by: NURSE PRACTITIONER

## 2023-03-15 NOTE — LETTER
3/15/2023    MICHELLE WATKINS  [facility] [address]  [facility] [address]    RE: Reynaldo Monteiro       Dear Colleague,     I had the pleasure of seeing Reynaldo Monteiro in the [facility].  Cardiology Clinic Progress Note  Reynaldo Monteiro MRN# [id number]   YOB: 1940 Age: 82 year old     Primary cardiologist: Dr. Meadows     Reason for visit: s/p TAVR     History of presenting illness:    Reynaldo Monteiro is a pleasant 82 year old patient with past medical history significant for type 2 diabetes, hypertension, hyperlipidemia, chronic diastolic heart failure, and severe aortic stenosis who underwent TAVR on 2/7/2023 that was complicated by postoperative paroxysmal complete AV block noted on event monitor s/p PPM implantation on 2/14/2023.     Since that time, the device clinic received an alert on 2/15/2023 noting atrial arrhythmias lasting up to 1.5 hours with rates in the 70s.  He was started on Eliquis and his aspirin was discontinued.  His most recent device check on 2/28/2023 noted 11 episodes of atrial arrhythmias with ventricular rates ranging from 69 to 103 bpm, the longest episode lasting greater than 24 hours, with a burden of 39%.     Today Spencer reports doing well from a cardiovascular standpoint.  He denies any chest pain, shortness of breath, syncope or near syncope, or palpitations.  He has no PND orthopnea.  He is tolerating activity without any symptoms.  His groin sites have healed well.  His pacemaker site is soft without hematoma.    His most recent echocardiogram showed well-seated bioprosthetic aortic valve with a mean gradient of 12 mmHg, mild aortic regurgitation, and preserved LV function.     I reviewed his most recent labs.  His CBC and BMP are unremarkable.         Assessment and Plan:     ASSESSMENT:    1. Severe aortic stenosis s/p TAVR with 29 mm Villanueva Davi 3 valve. Most recent echo as outlined above. Tolerating activity without symptoms. On  "apixaban for anticoagulation.   2. Hypertension. Well-controlled on lisinopril 10 mg daily.  3. Hyperlipidemia. LDL 65, on rosuvastatin 10 mg daily.   4. Chronic diastolic heart failure, NYHA class I. Euvolemic on exam, not on diuretic therapy.   5. Complete AV block s/p PPM. Most recent device check shows normal function.   6. Paroxysmal atrial fibrillation. On Eliquis for anticoagulation.     PLAN:     1. Spencer is doing well from a cardiovascular standpoint, I have made no changes today.  2. We discussed the need for lifelong antibiotic prophylaxis prior to any dental procedure.  3. Follow-up with Dr. Meadows in approximately 6 months, sooner if needed.         Zeenat Fox, LOREN, APRN, CNP  Page: [phone] (8a-5p M-F)    Orders this Visit:  Orders Placed This Encounter   Procedures     EKG 12-lead complete w/read - Clinics (performed today)     No orders of the defined types were placed in this encounter.    There are no discontinued medications.    Today's clinic visit entailed:  Review of the result(s) of each unique test - echo, labs, EKG  Ordering of each unique test  Prescription drug management  30 minutes spent on the date of the encounter doing chart review, review of test results, interpretation of tests, patient visit and documentation   Provider  Link to [facility] Help Grid     The level of medical decision making during this visit was of moderate complexity.           Review of Systems:     Review of Systems:  Skin:        Eyes:  Positive for    ENT:       Respiratory:  Negative    Cardiovascular:  palpitations;Negative;lightheadedness;dizziness;edema;fatigue chest pain;Positive for  Gastroenterology:      Genitourinary:       Musculoskeletal:       Neurologic:  Positive for headaches  Psychiatric:       Heme/Lymph/Imm:       Endocrine:                 Physical Exam:   Vitals: /80   Pulse 70   Ht 1.778 m (5' 10\")   Wt 84.9 kg (187 lb 3.2 oz)   SpO2 97%   BMI 26.86 kg/m    Constitutional:  " cooperative        Skin:  warm and dry to the touch        Head:  normocephalic        Eyes:  pupils equal and round        ENT:  no pallor or cyanosis        Neck:  JVP normal        Chest:  normal breath sounds, clear to auscultation, normal A-P diameter, normal symmetry, normal respiratory excursion, no use of accessory muscles        Cardiac: regular rhythm;normal S1 and S2       systolic ejection murmur;grade 1          Abdomen:  abdomen soft        Vascular: pulses full and equal                                      Extremities and Back:  no edema        Neurological:  affect appropriate;no gross motor deficits             Medications:     Current Outpatient Medications   Medication Sig Dispense Refill     apixaban ANTICOAGULANT (ELIQUIS ANTICOAGULANT) 5 MG tablet Take 1 tablet (5 mg) by mouth 2 times daily 180 tablet 4     doxazosin (CARDURA) 4 MG tablet Take 2 mg by mouth At Bedtime (4MG X 0.5 = 2MG)       empagliflozin (JARDIANCE) 10 MG TABS tablet Take 10 mg by mouth daily       glucosamine 500 MG CAPS capsule Take 500 mg by mouth daily       lisinopril (ZESTRIL) 10 MG tablet Take 1 tablet (10 mg) by mouth 2 times daily 180 tablet 3     Multiple Vitamins-Minerals (ZINC PO) Take 1 tablet by mouth daily Patient unsure of dose       polyethylene glycol (MIRALAX) 17 GM/Dose powder Take 1 capful by mouth daily as needed for constipation       rosuvastatin (CRESTOR) 20 MG tablet Take 10 mg by mouth daily (20MG X 0.5 = 10MG)       tadalafil (CIALIS) 20 MG tablet Take 20 mg by mouth daily as needed       vitamin C with B complex (B COMPLEX-C) tablet Take 1 tablet by mouth daily       vitamin D3 (CHOLECALCIFEROL) 50 mcg (2000 units) tablet Take 1 tablet by mouth daily       vitamin E 400 UNIT capsule Take 400 Units by mouth daily         Family History   Problem Relation Age of Onset     Hypertension Mother         valve replacement      Coronary Artery Disease Father        Social History     Socioeconomic History      Marital status:      Spouse name: Karissa     Number of children: Not on file     Years of education: Not on file     Highest education level: Not on file   Occupational History     Occupation: Small business owner   Tobacco Use     Smoking status: Never     Smokeless tobacco: Never   Substance and Sexual Activity     Alcohol use: Yes     Alcohol/week: 4.0 standard drinks     Types: 4 Drinks containing 0.5 oz of alcohol per week     Comment: 1-2 a week      Drug use: Never     Sexual activity: Not on file   Other Topics Concern     Parent/sibling w/ CABG, MI or angioplasty before 65F 55M? Not Asked   Social History Narrative     Not on file     Social Determinants of Health     Financial Resource Strain: Not on file   Food Insecurity: Not on file   Transportation Needs: Not on file   Physical Activity: Not on file   Stress: Not on file   Social Connections: Not on file   Intimate Partner Violence: Not on file   Housing Stability: Not on file            Past Medical History:     Past Medical History:   Diagnosis Date     Aortic valve stenosis      Esophagitis, reflux      Hyperlipidemia      Hypertension      Lumbar disc disorder with myelopathy     bilateral leg weakness     RBBB (right bundle branch block)      S/p TAVR (transcatheter aortic valve replacement), bioprosthetic 2/7/2023     Severe aortic valve stenosis      Type 2 diabetes mellitus (H)               Past Surgical History:     Past Surgical History:   Procedure Laterality Date     ANGIOGRAM      Patient reports coronary angiogram at [place], [place] approx 10 years ago     CV CORONARY ANGIOGRAM N/A 11/29/2022    Procedure: Coronary Angiogram;  Surgeon: Edwin Meadows MD;  Location: [facility] CARDIAC CATH LAB     CV TRANSCATHETER AORTIC VALVE REPLACEMENT-FEMORAL APPROACH N/A 2/7/2023    Procedure: Transcatheter Aortic Valve Replacement-Femoral Approach;  Surgeon: Juan Jang MD;  Location:  HEART CARDIAC CATH LAB     EP  PACEMAKER DEVICE & LEAD IMPLANT- RIGHT ATRIAL & RIGHT VENTRICULAR N/A 2/13/2023    Procedure: Pacemaker Device & Lead Implant - Right Atrial & Right Ventricular;  Surgeon: Felix Pabon MD;  Location:  HEART CARDIAC CATH LAB              Allergies:   Patient has no known allergies.       Data:   All laboratory data reviewed:    Recent Labs   Lab Test 02/11/23  0818 01/31/23  1038 01/04/23  1014 10/04/22  1000 04/17/19  0000 09/04/18  0000   LDL  --   --   --   --  37 40   HDL  --   --   --   --  39 41   NHDL  --   --   --   --  57 57   CHOL  --   --   --   --  96 98   TRIG  --   --   --  132 112 86   TSH 1.39  --   --   --   --   --    NTBNP  --  145 81  --   --   --        Lab Results   Component Value Date    WBC 5.5 03/15/2023    WBC 6.8 10/03/2019    RBC 5.20 03/15/2023    RBC 4.94 10/03/2019    HGB 15.4 03/15/2023    HGB 15.1 10/03/2019    HCT 47.2 03/15/2023    HCT 45.1 10/03/2019    MCV 91 03/15/2023    MCV 91 10/03/2019    MCH 29.6 03/15/2023    MCH 30.6 10/03/2019    MCHC 32.6 03/15/2023    MCHC 33.5 10/03/2019    RDW 14.2 03/15/2023    RDW 12.8 10/03/2019     (L) 03/15/2023     10/03/2019       Lab Results   Component Value Date     03/15/2023     04/17/2019    POTASSIUM 4.3 03/15/2023    POTASSIUM 4.4 01/04/2023    POTASSIUM 4.4 04/17/2019    CHLORIDE 106 03/15/2023    CHLORIDE 106 01/04/2023    CHLORIDE 102 10/04/2022    CHLORIDE 104 04/17/2019    CO2 27 03/15/2023    CO2 25 01/04/2023    CO2 28 04/17/2019    ANIONGAP 9 03/15/2023    ANIONGAP 8 01/04/2023    ANIONGAP 11 10/20/2009     (H) 03/15/2023     (H) 02/14/2023     (H) 01/04/2023    GLC 88 04/17/2019    BUN 12.8 03/15/2023    BUN 18 01/04/2023    BUN 18 04/17/2019    CR 0.80 03/15/2023    CR 0.87 04/17/2019    GFRESTIMATED 89 03/15/2023    GFRESTIMATED 83 04/17/2019    GFRESTBLACK 96 04/17/2019    GIULIANO 9.3 03/15/2023    GIULIANO 9.2 04/17/2019      Lab Results   Component Value Date    AST 23  01/04/2023    AST 23 04/17/2019    ALT 34 01/04/2023    ALT 23 04/17/2019       Lab Results   Component Value Date    A1C 6.4 (H) 02/11/2023       Lab Results   Component Value Date    INR 0.98 01/31/2023    INR 1.08 01/04/2023       KCCQ-12  1. 5  2. 5  3. 4  4. 5  5. 7  6. 7  7. 5  8. 5  9. 5  10. 5  11. 5  12. 5      Thank you for allowing me to participate in the care of your patient.      Sincerely,     Zeenat Fox, [facility]  cc:   Juan Jang MD  [address]  [place] [id number]

## 2023-03-15 NOTE — PATIENT INSTRUCTIONS
Today's Plan:     1) Follow-up with Dr. Meadows in about 4-6 months.     Maryse RN ([phone]), Carine RN ([phone]), and Michelle ANDERSON ([phone])    Scheduling phone number: [phone]    Reminder: Please bring in all current medications, over the counter supplements and vitamin bottles to your next appointment.-    It was a pleasure seeing you today!     Zeenat Fox, RAND  3/15/2023    -

## 2023-03-15 NOTE — PROGRESS NOTES
Cardiology Clinic Progress Note  Reynaldo Monteiro MRN# [id number]   YOB: 1940 Age: 82 year old     Primary cardiologist: Dr. Meadows     Reason for visit: s/p TAVR     History of presenting illness:    Reynaldo Monteiro is a pleasant 82 year old patient with past medical history significant for type 2 diabetes, hypertension, hyperlipidemia, chronic diastolic heart failure, and severe aortic stenosis who underwent TAVR on 2/7/2023 that was complicated by postoperative paroxysmal complete AV block noted on event monitor s/p PPM implantation on 2/14/2023.     Since that time, the device clinic received an alert on 2/15/2023 noting atrial arrhythmias lasting up to 1.5 hours with rates in the 70s.  He was started on Eliquis and his aspirin was discontinued.  His most recent device check on 2/28/2023 noted 11 episodes of atrial arrhythmias with ventricular rates ranging from 69 to 103 bpm, the longest episode lasting greater than 24 hours, with a burden of 39%.     Today Spencer reports doing well from a cardiovascular standpoint.  He denies any chest pain, shortness of breath, syncope or near syncope, or palpitations.  He has no PND orthopnea.  He is tolerating activity without any symptoms.  His groin sites have healed well.  His pacemaker site is soft without hematoma.    His most recent echocardiogram showed well-seated bioprosthetic aortic valve with a mean gradient of 12 mmHg, mild aortic regurgitation, and preserved LV function.     I reviewed his most recent labs.  His CBC and BMP are unremarkable.         Assessment and Plan:     ASSESSMENT:    1. Severe aortic stenosis s/p TAVR with 29 mm Villanueva Davi 3 valve. Most recent echo as outlined above. Tolerating activity without symptoms. On apixaban for anticoagulation.   2. Hypertension. Well-controlled on lisinopril 10 mg daily.  3. Hyperlipidemia. LDL 65, on rosuvastatin 10 mg daily.   4. Chronic diastolic heart failure, NYHA class I. Euvolemic on exam,  "not on diuretic therapy.   5. Complete AV block s/p PPM. Most recent device check shows normal function.   6. Paroxysmal atrial fibrillation. On Eliquis for anticoagulation.     PLAN:     1. Spencer is doing well from a cardiovascular standpoint, I have made no changes today.  2. We discussed the need for lifelong antibiotic prophylaxis prior to any dental procedure.  3. Follow-up with Dr. Meadows in approximately 6 months, sooner if needed.         Zeenat Fox, DNP, APRN, CNP  Page: [phone] (8a-5p M-F)    Orders this Visit:  Orders Placed This Encounter   Procedures     EKG 12-lead complete w/read - Clinics (performed today)     No orders of the defined types were placed in this encounter.    There are no discontinued medications.    Today's clinic visit entailed:  Review of the result(s) of each unique test - echo, labs, EKG  Ordering of each unique test  Prescription drug management  30 minutes spent on the date of the encounter doing chart review, review of test results, interpretation of tests, patient visit and documentation   Provider  Link to [facility] Help Grid     The level of medical decision making during this visit was of moderate complexity.           Review of Systems:     Review of Systems:  Skin:        Eyes:  Positive for    ENT:       Respiratory:  Negative    Cardiovascular:  palpitations;Negative;lightheadedness;dizziness;edema;fatigue chest pain;Positive for  Gastroenterology:      Genitourinary:       Musculoskeletal:       Neurologic:  Positive for headaches  Psychiatric:       Heme/Lymph/Imm:       Endocrine:                 Physical Exam:   Vitals: /80   Pulse 70   Ht 1.778 m (5' 10\")   Wt 84.9 kg (187 lb 3.2 oz)   SpO2 97%   BMI 26.86 kg/m    Constitutional:  cooperative        Skin:  warm and dry to the touch        Head:  normocephalic        Eyes:  pupils equal and round        ENT:  no pallor or cyanosis        Neck:  JVP normal        Chest:  normal breath sounds, clear to " auscultation, normal A-P diameter, normal symmetry, normal respiratory excursion, no use of accessory muscles        Cardiac: regular rhythm;normal S1 and S2       systolic ejection murmur;grade 1          Abdomen:  abdomen soft        Vascular: pulses full and equal                                      Extremities and Back:  no edema        Neurological:  affect appropriate;no gross motor deficits             Medications:     Current Outpatient Medications   Medication Sig Dispense Refill     apixaban ANTICOAGULANT (ELIQUIS ANTICOAGULANT) 5 MG tablet Take 1 tablet (5 mg) by mouth 2 times daily 180 tablet 4     doxazosin (CARDURA) 4 MG tablet Take 2 mg by mouth At Bedtime (4MG X 0.5 = 2MG)       empagliflozin (JARDIANCE) 10 MG TABS tablet Take 10 mg by mouth daily       glucosamine 500 MG CAPS capsule Take 500 mg by mouth daily       lisinopril (ZESTRIL) 10 MG tablet Take 1 tablet (10 mg) by mouth 2 times daily 180 tablet 3     Multiple Vitamins-Minerals (ZINC PO) Take 1 tablet by mouth daily Patient unsure of dose       polyethylene glycol (MIRALAX) 17 GM/Dose powder Take 1 capful by mouth daily as needed for constipation       rosuvastatin (CRESTOR) 20 MG tablet Take 10 mg by mouth daily (20MG X 0.5 = 10MG)       tadalafil (CIALIS) 20 MG tablet Take 20 mg by mouth daily as needed       vitamin C with B complex (B COMPLEX-C) tablet Take 1 tablet by mouth daily       vitamin D3 (CHOLECALCIFEROL) 50 mcg (2000 units) tablet Take 1 tablet by mouth daily       vitamin E 400 UNIT capsule Take 400 Units by mouth daily         Family History   Problem Relation Age of Onset     Hypertension Mother         valve replacement      Coronary Artery Disease Father        Social History     Socioeconomic History     Marital status:      Spouse name: Karissa     Number of children: Not on file     Years of education: Not on file     Highest education level: Not on file   Occupational History     Occupation: Small business  owner   Tobacco Use     Smoking status: Never     Smokeless tobacco: Never   Substance and Sexual Activity     Alcohol use: Yes     Alcohol/week: 4.0 standard drinks     Types: 4 Drinks containing 0.5 oz of alcohol per week     Comment: 1-2 a week      Drug use: Never     Sexual activity: Not on file   Other Topics Concern     Parent/sibling w/ CABG, MI or angioplasty before 65F 55M? Not Asked   Social History Narrative     Not on file     Social Determinants of Health     Financial Resource Strain: Not on file   Food Insecurity: Not on file   Transportation Needs: Not on file   Physical Activity: Not on file   Stress: Not on file   Social Connections: Not on file   Intimate Partner Violence: Not on file   Housing Stability: Not on file            Past Medical History:     Past Medical History:   Diagnosis Date     Aortic valve stenosis      Esophagitis, reflux      Hyperlipidemia      Hypertension      Lumbar disc disorder with myelopathy     bilateral leg weakness     RBBB (right bundle branch block)      S/p TAVR (transcatheter aortic valve replacement), bioprosthetic 2/7/2023     Severe aortic valve stenosis      Type 2 diabetes mellitus (H)               Past Surgical History:     Past Surgical History:   Procedure Laterality Date     ANGIOGRAM      Patient reports coronary angiogram at [place], [place] approx 10 years ago     CV CORONARY ANGIOGRAM N/A 11/29/2022    Procedure: Coronary Angiogram;  Surgeon: Edwin Meadows MD;  Location: [facility] CARDIAC CATH LAB     CV TRANSCATHETER AORTIC VALVE REPLACEMENT-FEMORAL APPROACH N/A 2/7/2023    Procedure: Transcatheter Aortic Valve Replacement-Femoral Approach;  Surgeon: Juan Jang MD;  Location: [facility] CARDIAC CATH LAB     EP PACEMAKER DEVICE & LEAD IMPLANT- RIGHT ATRIAL & RIGHT VENTRICULAR N/A 2/13/2023    Procedure: Pacemaker Device & Lead Implant - Right Atrial & Right Ventricular;  Surgeon: Felix Pabon MD;  Location: [facility]  CARDIAC CATH LAB              Allergies:   Patient has no known allergies.       Data:   All laboratory data reviewed:    Recent Labs   Lab Test 02/11/23  0818 01/31/23  1038 01/04/23  1014 10/04/22  1000 04/17/19  0000 09/04/18  0000   LDL  --   --   --   --  37 40   HDL  --   --   --   --  39 41   NHDL  --   --   --   --  57 57   CHOL  --   --   --   --  96 98   TRIG  --   --   --  132 112 86   TSH 1.39  --   --   --   --   --    NTBNP  --  145 81  --   --   --        Lab Results   Component Value Date    WBC 5.5 03/15/2023    WBC 6.8 10/03/2019    RBC 5.20 03/15/2023    RBC 4.94 10/03/2019    HGB 15.4 03/15/2023    HGB 15.1 10/03/2019    HCT 47.2 03/15/2023    HCT 45.1 10/03/2019    MCV 91 03/15/2023    MCV 91 10/03/2019    MCH 29.6 03/15/2023    MCH 30.6 10/03/2019    MCHC 32.6 03/15/2023    MCHC 33.5 10/03/2019    RDW 14.2 03/15/2023    RDW 12.8 10/03/2019     (L) 03/15/2023     10/03/2019       Lab Results   Component Value Date     03/15/2023     04/17/2019    POTASSIUM 4.3 03/15/2023    POTASSIUM 4.4 01/04/2023    POTASSIUM 4.4 04/17/2019    CHLORIDE 106 03/15/2023    CHLORIDE 106 01/04/2023    CHLORIDE 102 10/04/2022    CHLORIDE 104 04/17/2019    CO2 27 03/15/2023    CO2 25 01/04/2023    CO2 28 04/17/2019    ANIONGAP 9 03/15/2023    ANIONGAP 8 01/04/2023    ANIONGAP 11 10/20/2009     (H) 03/15/2023     (H) 02/14/2023     (H) 01/04/2023    GLC 88 04/17/2019    BUN 12.8 03/15/2023    BUN 18 01/04/2023    BUN 18 04/17/2019    CR 0.80 03/15/2023    CR 0.87 04/17/2019    GFRESTIMATED 89 03/15/2023    GFRESTIMATED 83 04/17/2019    GFRESTBLACK 96 04/17/2019    GIULIANO 9.3 03/15/2023    GIULIANO 9.2 04/17/2019      Lab Results   Component Value Date    AST 23 01/04/2023    AST 23 04/17/2019    ALT 34 01/04/2023    ALT 23 04/17/2019       Lab Results   Component Value Date    A1C 6.4 (H) 02/11/2023       Lab Results   Component Value Date    INR 0.98 01/31/2023    INR 1.08  01/04/2023       KCCQ-12  1. 5  2. 5  3. 4  4. 5  5. 7  6. 7  7. 5  8. 5  9. 5  10. 5  11. 5  12. 5

## 2023-03-16 ENCOUNTER — TELEPHONE (OUTPATIENT)
Dept: CARDIOLOGY | Facility: CLINIC | Age: 83
End: 2023-03-16
Payer: MEDICARE

## 2023-03-16 NOTE — TELEPHONE ENCOUNTER
[facility] Call Center    Phone Message    May a detailed message be left on voicemail: yes     Reason for Call: Medication Question or concern regarding medication   Prescription Clarification  Name of Medication: Metoprolol  Prescribing Provider: JUSTICE   Pharmacy: [id number] - [place], [place] - [address] [place] [address] [address] [facility] [address]     What on the order needs clarification? The patient said that a RX of this medication was sent to the pharmacy for him and he wanted to talk to a nurse about it as he knows he should not be taking this.  Please call to discuss          Action Taken: Other: Cardiology    Travel Screening: Not Applicable     Thank you!  [facility]

## 2023-03-20 NOTE — TELEPHONE ENCOUNTER
Left detailed VM requesting callback if still needing assistance with this.     Carine Li, RAADN, RN, PHN, CHFN, HNB-BC   3/20/2023 at 12:08 PM

## 2023-04-13 ENCOUNTER — ANCILLARY PROCEDURE (OUTPATIENT)
Dept: CARDIOLOGY | Facility: CLINIC | Age: 83
End: 2023-04-13
Attending: INTERNAL MEDICINE
Payer: MEDICARE

## 2023-04-13 DIAGNOSIS — Z95.0 CARDIAC PACEMAKER IN SITU: ICD-10-CM

## 2023-04-13 LAB
MDC_IDC_LEAD_IMPLANT_DT: NORMAL
MDC_IDC_LEAD_IMPLANT_DT: NORMAL
MDC_IDC_LEAD_LOCATION: NORMAL
MDC_IDC_LEAD_LOCATION: NORMAL
MDC_IDC_LEAD_LOCATION_DETAIL_1: NORMAL
MDC_IDC_LEAD_LOCATION_DETAIL_1: NORMAL
MDC_IDC_LEAD_MFG: NORMAL
MDC_IDC_LEAD_MFG: NORMAL
MDC_IDC_LEAD_MODEL: NORMAL
MDC_IDC_LEAD_MODEL: NORMAL
MDC_IDC_LEAD_POLARITY_TYPE: NORMAL
MDC_IDC_LEAD_POLARITY_TYPE: NORMAL
MDC_IDC_LEAD_SERIAL: NORMAL
MDC_IDC_LEAD_SERIAL: NORMAL
MDC_IDC_MSMT_BATTERY_STATUS: NORMAL
MDC_IDC_MSMT_LEADCHNL_RA_IMPEDANCE_VALUE: 643 OHM
MDC_IDC_MSMT_LEADCHNL_RA_PACING_THRESHOLD_AMPLITUDE: 0.7 V
MDC_IDC_MSMT_LEADCHNL_RA_PACING_THRESHOLD_AMPLITUDE: 0.7 V
MDC_IDC_MSMT_LEADCHNL_RA_PACING_THRESHOLD_PULSEWIDTH: 0.4 MS
MDC_IDC_MSMT_LEADCHNL_RA_PACING_THRESHOLD_PULSEWIDTH: 0.4 MS
MDC_IDC_MSMT_LEADCHNL_RA_SENSING_INTR_AMPL: 7.3 MV
MDC_IDC_MSMT_LEADCHNL_RA_SENSING_INTR_AMPL: 7.9 MV
MDC_IDC_MSMT_LEADCHNL_RV_IMPEDANCE_VALUE: 604 OHM
MDC_IDC_MSMT_LEADCHNL_RV_PACING_THRESHOLD_AMPLITUDE: 0.6 V
MDC_IDC_MSMT_LEADCHNL_RV_PACING_THRESHOLD_AMPLITUDE: 0.6 V
MDC_IDC_MSMT_LEADCHNL_RV_PACING_THRESHOLD_AMPLITUDE: 0.7 V
MDC_IDC_MSMT_LEADCHNL_RV_PACING_THRESHOLD_PULSEWIDTH: 0.4 MS
MDC_IDC_MSMT_LEADCHNL_RV_SENSING_INTR_AMPL: 11.3 MV
MDC_IDC_MSMT_LEADCHNL_RV_SENSING_INTR_AMPL: 16.4 MV
MDC_IDC_PG_IMPLANT_DTM: NORMAL
MDC_IDC_PG_MFG: NORMAL
MDC_IDC_PG_MODEL: NORMAL
MDC_IDC_PG_SERIAL: NORMAL
MDC_IDC_PG_TYPE: NORMAL
MDC_IDC_SESS_CLINIC_NAME: NORMAL
MDC_IDC_SESS_DTM: NORMAL
MDC_IDC_SESS_REPROGRAMMED: NORMAL
MDC_IDC_SESS_TYPE: NORMAL
MDC_IDC_SET_BRADY_AT_MODE_SWITCH_MODE: NORMAL
MDC_IDC_SET_BRADY_AT_MODE_SWITCH_RATE: 180 {BEATS}/MIN
MDC_IDC_SET_BRADY_HYSTRATE: 60 {BEATS}/MIN
MDC_IDC_SET_BRADY_LOWRATE: 60 {BEATS}/MIN
MDC_IDC_SET_BRADY_MAX_SENSOR_RATE: 120 {BEATS}/MIN
MDC_IDC_SET_BRADY_MAX_TRACKING_RATE: 130 {BEATS}/MIN
MDC_IDC_SET_BRADY_MODE: NORMAL
MDC_IDC_SET_BRADY_NIGHT_RATE: 60 {BEATS}/MIN
MDC_IDC_SET_BRADY_PAV_DELAY_HIGH: 160 MS
MDC_IDC_SET_BRADY_PAV_DELAY_LOW: 200 MS
MDC_IDC_SET_BRADY_SAV_DELAY_HIGH: 130 MS
MDC_IDC_SET_BRADY_SAV_DELAY_LOW: 170 MS
MDC_IDC_SET_CRT_PACED_CHAMBERS: NORMAL
MDC_IDC_SET_LEADCHNL_LV_PACING_CATHODE_ELECTRODE_1: NORMAL
MDC_IDC_SET_LEADCHNL_LV_PACING_CATHODE_LOCATION_1: NORMAL
MDC_IDC_SET_LEADCHNL_LV_PACING_POLARITY: NORMAL
MDC_IDC_SET_LEADCHNL_LV_SENSING_CATHODE_ELECTRODE_1: NORMAL
MDC_IDC_SET_LEADCHNL_LV_SENSING_CATHODE_LOCATION_1: NORMAL
MDC_IDC_SET_LEADCHNL_LV_SENSING_POLARITY: NORMAL
MDC_IDC_SET_LEADCHNL_RA_PACING_AMPLITUDE: 3 V
MDC_IDC_SET_LEADCHNL_RA_PACING_POLARITY: NORMAL
MDC_IDC_SET_LEADCHNL_RA_PACING_PULSEWIDTH: 0.4 MS
MDC_IDC_SET_LEADCHNL_RA_SENSING_ADAPTATION_MODE: NORMAL
MDC_IDC_SET_LEADCHNL_RA_SENSING_POLARITY: NORMAL
MDC_IDC_SET_LEADCHNL_RA_SENSING_SENSITIVITY: NORMAL
MDC_IDC_SET_LEADCHNL_RV_PACING_AMPLITUDE: 1.2 V
MDC_IDC_SET_LEADCHNL_RV_PACING_POLARITY: NORMAL
MDC_IDC_SET_LEADCHNL_RV_PACING_PULSEWIDTH: 0.4 MS
MDC_IDC_SET_LEADCHNL_RV_SENSING_ADAPTATION_MODE: NORMAL
MDC_IDC_SET_LEADCHNL_RV_SENSING_POLARITY: NORMAL
MDC_IDC_SET_LEADCHNL_RV_SENSING_SENSITIVITY: NORMAL

## 2023-04-13 PROCEDURE — 93280 PM DEVICE PROGR EVAL DUAL: CPT | Performed by: INTERNAL MEDICINE

## 2023-05-18 ENCOUNTER — TELEPHONE (OUTPATIENT)
Dept: CARDIOLOGY | Facility: CLINIC | Age: 83
End: 2023-05-18
Payer: MEDICARE

## 2023-05-18 DIAGNOSIS — Z95.3 S/P TAVR (TRANSCATHETER AORTIC VALVE REPLACEMENT), BIOPROSTHETIC: Primary | ICD-10-CM

## 2023-05-18 RX ORDER — AMOXICILLIN 500 MG/1
2000 CAPSULE ORAL ONCE
Qty: 4 CAPSULE | Refills: 11 | Status: SHIPPED | OUTPATIENT
Start: 2023-05-18 | End: 2023-05-18

## 2023-05-18 NOTE — TELEPHONE ENCOUNTER
[facility] Call Center    Phone Message    May a detailed message be left on voicemail: yes     Reason for Call: Other: Patient has a dental cleaning 5/24 and needs an antibiotic sent to [facility] .      Action Taken: Other: cardiology    Travel Screening: Not Applicable   Thank you!  [facility]

## 2023-06-07 ENCOUNTER — TELEPHONE (OUTPATIENT)
Dept: CARDIOLOGY | Facility: CLINIC | Age: 83
End: 2023-06-07
Payer: MEDICARE

## 2023-06-07 DIAGNOSIS — Z29.8 * * * SBE PROPHYLAXIS * * *: ICD-10-CM

## 2023-06-07 DIAGNOSIS — Z95.3 S/P TAVR (TRANSCATHETER AORTIC VALVE REPLACEMENT), BIOPROSTHETIC: Primary | ICD-10-CM

## 2023-06-07 RX ORDER — AMOXICILLIN 500 MG/1
2000 CAPSULE ORAL PRN
Qty: 4 CAPSULE | Refills: 11 | Status: SHIPPED | OUTPATIENT
Start: 2023-06-07 | End: 2024-03-25

## 2023-06-07 NOTE — TELEPHONE ENCOUNTER
Patient needs a tooth pulled and wonders if he needs abx prior. Reiterated that he needs abx prior to dental work for life. States he tolerated amoxicillin at last dental visit. Will reorder with refills. Provided direct contact number.    Carine Li, RAADN, RN, PHN, CHFN, HNB-BC   6/7/2023 at 11:51 AM

## 2023-06-07 NOTE — TELEPHONE ENCOUNTER
[facility] Call Center    Phone Message    May a detailed message be left on voicemail: yes     Reason for Call: Other: patient is calling and would like his care team to call him as he would like to discuss a few additional things, patient also needs antibiotic for a upcoming dentist appt, please call patient to further discuss, thank you.     Action Taken: Other: cardiology    Travel Screening: Not Applicable   Thank you!  [facility]

## 2023-07-11 ENCOUNTER — CARE COORDINATION (OUTPATIENT)
Dept: CARDIOLOGY | Facility: CLINIC | Age: 83
End: 2023-07-11
Payer: MEDICARE

## 2023-07-11 NOTE — PROGRESS NOTES
Incoming call from [facility] at Oral Surgery office requesting input from Dr. Meadows on upcoming dental extractions. Returned call to [facility] who had left for the day, left message for [facility] requesting callback.     Carine Li, RAADN, RN, PHN, CHFN, HNB-BC   7/11/2023 at 3:45 PM

## 2023-07-11 NOTE — LETTER
Dr. Kellogg,    I am seeing our mutual patient Reynaldo Monteiro for his cardiology care. On February 7, 2023, Mr. Monteiro underwent transcatheter aortic valve replacement with an Villanueva Davi 3, size 29mm bioprosthetic valve. Mr. Monteiro has since recovered well from the procedure, with most recent echocardiogram in March of this year showing a well-seated valve and improved aortic valve mean gradient of 12mmHg.     Mr. Monteiro requires lifelong subacute bacterial endocarditis prophylaxis with any dental procedure.    Mr. Monteiro is therefore cleared from a cardiology perspective to undergo any required dental procedures. I expect he would do well with local or moderate anesthesia and with the addition of epinephrine if needed.    Please let me know if you have any additional questions or concerns.     Edwin Medina MD  Cardiologist    Nurse ([facility]): [phone]

## 2023-07-20 ENCOUNTER — ANCILLARY PROCEDURE (OUTPATIENT)
Dept: CARDIOLOGY | Facility: CLINIC | Age: 83
End: 2023-07-20
Attending: INTERNAL MEDICINE
Payer: MEDICARE

## 2023-07-20 DIAGNOSIS — Z95.0 CARDIAC PACEMAKER IN SITU: ICD-10-CM

## 2023-07-20 LAB
MDC_IDC_LEAD_IMPLANT_DT: NORMAL
MDC_IDC_LEAD_IMPLANT_DT: NORMAL
MDC_IDC_LEAD_LOCATION: NORMAL
MDC_IDC_LEAD_LOCATION: NORMAL
MDC_IDC_LEAD_LOCATION_DETAIL_1: NORMAL
MDC_IDC_LEAD_LOCATION_DETAIL_1: NORMAL
MDC_IDC_LEAD_MFG: NORMAL
MDC_IDC_LEAD_MFG: NORMAL
MDC_IDC_LEAD_MODEL: NORMAL
MDC_IDC_LEAD_MODEL: NORMAL
MDC_IDC_LEAD_POLARITY_TYPE: NORMAL
MDC_IDC_LEAD_POLARITY_TYPE: NORMAL
MDC_IDC_LEAD_SERIAL: NORMAL
MDC_IDC_LEAD_SERIAL: NORMAL
MDC_IDC_MSMT_BATTERY_REMAINING_PERCENTAGE: 95 %
MDC_IDC_MSMT_BATTERY_STATUS: NORMAL
MDC_IDC_MSMT_LEADCHNL_RA_IMPEDANCE_VALUE: 628 OHM
MDC_IDC_MSMT_LEADCHNL_RA_LEAD_CHANNEL_STATUS: NORMAL
MDC_IDC_MSMT_LEADCHNL_RV_IMPEDANCE_VALUE: 586 OHM
MDC_IDC_MSMT_LEADCHNL_RV_LEAD_CHANNEL_STATUS: NORMAL
MDC_IDC_MSMT_LEADCHNL_RV_PACING_THRESHOLD_AMPLITUDE: 0.7 V
MDC_IDC_MSMT_LEADCHNL_RV_PACING_THRESHOLD_PULSEWIDTH: 0.4 MS
MDC_IDC_PG_IMPLANT_DTM: NORMAL
MDC_IDC_PG_MFG: NORMAL
MDC_IDC_PG_MODEL: NORMAL
MDC_IDC_PG_SERIAL: NORMAL
MDC_IDC_PG_TYPE: NORMAL
MDC_IDC_SESS_CLINIC_NAME: NORMAL
MDC_IDC_SESS_DTM: NORMAL
MDC_IDC_SESS_REPROGRAMMED: NO
MDC_IDC_SESS_TYPE: NORMAL
MDC_IDC_SET_BRADY_AT_MODE_SWITCH_MODE: NORMAL
MDC_IDC_SET_BRADY_AT_MODE_SWITCH_RATE: 180 {BEATS}/MIN
MDC_IDC_SET_BRADY_LOWRATE: 60 {BEATS}/MIN
MDC_IDC_SET_BRADY_MAX_SENSOR_RATE: 120 {BEATS}/MIN
MDC_IDC_SET_BRADY_MAX_TRACKING_RATE: 130 {BEATS}/MIN
MDC_IDC_SET_BRADY_MODE: NORMAL
MDC_IDC_SET_BRADY_PAV_DELAY_HIGH: 160 MS
MDC_IDC_SET_BRADY_PAV_DELAY_LOW: 200 MS
MDC_IDC_SET_BRADY_SAV_DELAY_HIGH: 130 MS
MDC_IDC_SET_BRADY_SAV_DELAY_LOW: 170 MS
MDC_IDC_SET_LEADCHNL_RA_PACING_AMPLITUDE: 3 V
MDC_IDC_SET_LEADCHNL_RA_PACING_POLARITY: NORMAL
MDC_IDC_SET_LEADCHNL_RA_PACING_PULSEWIDTH: 0.4 MS
MDC_IDC_SET_LEADCHNL_RA_SENSING_ADAPTATION_MODE: NORMAL
MDC_IDC_SET_LEADCHNL_RA_SENSING_POLARITY: NORMAL
MDC_IDC_SET_LEADCHNL_RV_PACING_AMPLITUDE: 1.1 V
MDC_IDC_SET_LEADCHNL_RV_PACING_POLARITY: NORMAL
MDC_IDC_SET_LEADCHNL_RV_PACING_PULSEWIDTH: 0.4 MS
MDC_IDC_SET_LEADCHNL_RV_SENSING_ADAPTATION_MODE: NORMAL
MDC_IDC_SET_LEADCHNL_RV_SENSING_POLARITY: NORMAL
MDC_IDC_STAT_AT_BURDEN_PERCENT: 0 %
MDC_IDC_STAT_AT_DTM_END: NORMAL
MDC_IDC_STAT_AT_DTM_START: NORMAL
MDC_IDC_STAT_AT_MODE_SW_COUNT_PER_DAY: 1
MDC_IDC_STAT_AT_MODE_SW_PERCENT_TIME_PER_DAY: 0 %
MDC_IDC_STAT_BRADY_AP_VP_PERCENT: 65 %
MDC_IDC_STAT_BRADY_AP_VS_PERCENT: 0 %
MDC_IDC_STAT_BRADY_AS_VP_PERCENT: 34 %
MDC_IDC_STAT_BRADY_AS_VS_PERCENT: 0 %
MDC_IDC_STAT_BRADY_DTM_END: NORMAL
MDC_IDC_STAT_BRADY_DTM_START: NORMAL
MDC_IDC_STAT_BRADY_RA_PERCENT_PACED: 66 %
MDC_IDC_STAT_BRADY_RV_PERCENT_PACED: 100 %
MDC_IDC_STAT_CRT_DTM_END: NORMAL
MDC_IDC_STAT_CRT_DTM_START: NORMAL

## 2023-07-20 PROCEDURE — 93296 REM INTERROG EVL PM/IDS: CPT | Performed by: INTERNAL MEDICINE

## 2023-07-20 PROCEDURE — 93294 REM INTERROG EVL PM/LDLS PM: CPT | Performed by: INTERNAL MEDICINE

## 2023-08-30 ENCOUNTER — OFFICE VISIT (OUTPATIENT)
Dept: CARDIOLOGY | Facility: CLINIC | Age: 83
End: 2023-08-30
Attending: INTERNAL MEDICINE
Payer: MEDICARE

## 2023-08-30 VITALS
BODY MASS INDEX: 30.31 KG/M2 | SYSTOLIC BLOOD PRESSURE: 121 MMHG | WEIGHT: 200 LBS | RESPIRATION RATE: 17 BRPM | HEART RATE: 70 BPM | OXYGEN SATURATION: 100 % | HEIGHT: 68 IN | DIASTOLIC BLOOD PRESSURE: 78 MMHG

## 2023-08-30 DIAGNOSIS — Z95.3 S/P TAVR (TRANSCATHETER AORTIC VALVE REPLACEMENT), BIOPROSTHETIC: Primary | ICD-10-CM

## 2023-08-30 DIAGNOSIS — I48.0 PAROXYSMAL ATRIAL FIBRILLATION (H): ICD-10-CM

## 2023-08-30 DIAGNOSIS — I10 BENIGN ESSENTIAL HYPERTENSION: ICD-10-CM

## 2023-08-30 DIAGNOSIS — Z95.0 CARDIAC PACEMAKER IN SITU: ICD-10-CM

## 2023-08-30 DIAGNOSIS — E78.5 HYPERLIPIDEMIA LDL GOAL <70: ICD-10-CM

## 2023-08-30 PROCEDURE — 99214 OFFICE O/P EST MOD 30 MIN: CPT | Performed by: INTERNAL MEDICINE

## 2023-08-30 RX ORDER — LISINOPRIL 10 MG/1
10 TABLET ORAL 2 TIMES DAILY
Qty: 200 TABLET | Refills: 6 | Status: SHIPPED | OUTPATIENT
Start: 2023-08-30 | End: 2024-08-23

## 2023-08-30 NOTE — PROGRESS NOTES
SERVICE DATE: 8/30/2023    REFERRING PROVIDER:  Juan Jang MD  [address]  [place] [address]    PRIMARY CARE PROVIDER:  Mick Torres  [facility] [address] [facility] [address]    REASON FOR VISIT:  Post TAVR follow up.    HISTORY OF PRESENT ILLNESS:  It was my pleasure to follow-up with Reynaldo is a very pleasant 83-year-old male who is status post recent transcatheter bioprosthetic aortic valve replacement with a 29 mm Villanueva ultra valve on 2/7/2023, complicated by postoperative high-grade AV block for which a dual-chamber pacemaker was implanted on 2/13/2023, mild coronary artery disease on angiogram dated 7/29/2022, type 2 diabetes mellitus, hypertension, hyperlipidemia, paroxysmal atrial fibrillation (on Eliquis anticoagulation).  BMI 30.  No tobacco user.    Patient is doing well.  Exercises regularly.  BP is 121/78, pulse 70 bpm.      Pacemaker interrogation shows 97% ventricular paced, 53% atrial paced, 14% paroxysmal atrial fibrillation.    I personally reviewed transthoracic echocardiogram dated 3/15/2023.  The bioprosthetic aortic valve is well-seated with a mean gradient of 10-12 mmHg, mild periprosthetic regurgitation.  Normal LVEF of 60%.  Mild concentric left ventricle hypertrophy.  Normal right ventricular size and systolic function.  Pacemaker lead in the right ventricle.  Trace tricuspid and mitral valve regurgitation.    I reviewed labs, echocardiogram, coronary angiogram dated 11/29/2022 and TAVR procedure note dated 2/7/2023.    Cardiac auscultation is normal.  No audible murmur.    DIAGNOSES/ASSESSMENT:  1.  Status post transcatheter bioprosthetic aortic valve replacement on 2/7/2023.  Well-functioning valve.  Normal LVEF.  No complaints.  2.  Status post dual-chamber pacemaker implanted on 2/13/2020.  Well-functioning.  No issues.  3.  Paroxysmal atrial fibrillation.  Continue long-term apixaban anticoagulation.  4.  Benign essential hypertension.   "Well-controlled on lisinopril 10 mg 2 times daily.  7.  Hyperlipidemia with goal LDL less than 70.  LDL is 65.  Continue rosuvastatin 20 mg daily.    PLAN:  1.  Prescriptions renewed.  2.  All test discussed in detail with patient.  3.  Follow-up with me in 1 year with previsit echocardiogram and fasting labs.      Kulwinder Villatoro MD      Established patient.   40 minutes spent by me on the date of the encounter doing chart review, history and exam, documentation and further activities per the note          PHYSICAL EXAMINATION:  Vitals: /78   Pulse 70   Resp 17   Ht 1.727 m (5' 8\")   Wt 90.7 kg (200 lb)   SpO2 100%   BMI 30.41 kg/m    Wt Readings from Last 5 Encounters:   08/30/23 90.7 kg (200 lb)   03/15/23 84.9 kg (187 lb 3.2 oz)   02/28/23 84.4 kg (186 lb)   02/13/23 83.1 kg (183 lb 3.2 oz)   02/07/23 86.2 kg (190 lb)     CARDIOVASCULAR:  Regular heart sounds.  No murmur. No carotid bruit.  RESPIRATORY:  Normal breath sounds. No rales or wheeze.  EXTREMITIES:  No edema.      Encounter Diagnoses   Name Primary?    S/p TAVR (transcatheter aortic valve replacement), bioprosthetic Yes    Benign essential hypertension     Paroxysmal atrial fibrillation (H)     Cardiac pacemaker in situ     Hyperlipidemia LDL goal <70          Orders Placed This Encounter   Procedures    CBC with platelets    Comprehensive metabolic panel    Lipid Profile    Follow-Up with Cardiology    Echocardiogram Complete           CURRENT MEDICATIONS:  Current Outpatient Medications   Medication Sig Dispense Refill    amoxicillin (AMOXIL) 500 MG capsule Take 4 capsules (2,000 mg) by mouth as needed (30-60 minutes prior to dental procedures) 4 capsule 11    apixaban ANTICOAGULANT (ELIQUIS ANTICOAGULANT) 5 MG tablet Take 1 tablet (5 mg) by mouth 2 times daily 200 tablet 6    doxazosin (CARDURA) 4 MG tablet Take 2 mg by mouth At Bedtime (4MG X 0.5 = 2MG)      empagliflozin (JARDIANCE) 10 MG TABS tablet Take 10 mg by mouth daily   "    glucosamine 500 MG CAPS capsule Take 500 mg by mouth daily      lisinopril (ZESTRIL) 10 MG tablet Take 1 tablet (10 mg) by mouth 2 times daily 200 tablet 6    Multiple Vitamins-Minerals (ZINC PO) Take 1 tablet by mouth daily Patient unsure of dose      polyethylene glycol (MIRALAX) 17 GM/Dose powder Take 1 capful by mouth daily as needed for constipation      rosuvastatin (CRESTOR) 20 MG tablet Take 10 mg by mouth daily (20MG X 0.5 = 10MG)      tadalafil (CIALIS) 20 MG tablet Take 20 mg by mouth daily as needed      vitamin C with B complex (B COMPLEX-C) tablet Take 1 tablet by mouth daily      vitamin D3 (CHOLECALCIFEROL) 50 mcg (2000 units) tablet Take 1 tablet by mouth daily      vitamin E 400 UNIT capsule Take 400 Units by mouth daily           ALLERGIES:  No Known Allergies    PAST MEDICAL HISTORY:    Past Medical History:   Diagnosis Date    Aortic valve stenosis     Esophagitis, reflux     Hyperlipidemia     Hypertension     Lumbar disc disorder with myelopathy     bilateral leg weakness    RBBB (right bundle branch block)     S/p TAVR (transcatheter aortic valve replacement), bioprosthetic 2/7/2023    Severe aortic valve stenosis     Type 2 diabetes mellitus (H)        PAST SURGICAL HISTORY:    Past Surgical History:   Procedure Laterality Date    ANGIOGRAM      Patient reports coronary angiogram at [place], [place] approx 10 years ago    CV CORONARY ANGIOGRAM N/A 11/29/2022    Procedure: Coronary Angiogram;  Surgeon: Edwin Meadows MD;  Location: [facility] CARDIAC CATH LAB    CV TRANSCATHETER AORTIC VALVE REPLACEMENT-FEMORAL APPROACH N/A 2/7/2023    Procedure: Transcatheter Aortic Valve Replacement-Femoral Approach;  Surgeon: Juan Jang MD;  Location: [facility] CARDIAC CATH LAB    EP PACEMAKER DEVICE & LEAD IMPLANT- RIGHT ATRIAL & RIGHT VENTRICULAR N/A 2/13/2023    Procedure: Pacemaker Device & Lead Implant - Right Atrial & Right Ventricular;  Surgeon: Felix Pabon MD;  Location:   HEART CARDIAC CATH LAB       FAMILY HISTORY:    Family History   Problem Relation Age of Onset    Hypertension Mother         valve replacement     Coronary Artery Disease Father        SOCIAL HISTORY:    Social History     Socioeconomic History    Marital status:      Spouse name: Karissa   Occupational History    Occupation: Small business owner   Tobacco Use    Smoking status: Never    Smokeless tobacco: Never   Substance and Sexual Activity    Alcohol use: Yes     Alcohol/week: 4.0 standard drinks of alcohol     Types: 4 Drinks containing 0.5 oz of alcohol per week     Comment: 1-2 a week     Drug use: Never

## 2023-08-30 NOTE — LETTER
8/30/2023    MICK WATKINS  [facility] [address] [facility] [address]    RE: Reynaldo Monteiro       Dear Colleague,     I had the pleasure of seeing Reynaldo Monteiro in the [facility].    SERVICE DATE: 8/30/2023    REFERRING PROVIDER:  Juan Jang MD  [address]  [place] [address]    PRIMARY CARE PROVIDER:  Mick Watkins  [facility] [address] [facility] [address]    REASON FOR VISIT:  Post TAVR follow up.    HISTORY OF PRESENT ILLNESS:  It was my pleasure to follow-up with Reynaldo is a very pleasant 83-year-old male who is status post recent transcatheter bioprosthetic aortic valve replacement with a 29 mm Villanueva ultra valve on 2/7/2023, complicated by postoperative high-grade AV block for which a dual-chamber pacemaker was implanted on 2/13/2023, mild coronary artery disease on angiogram dated 7/29/2022, type 2 diabetes mellitus, hypertension, hyperlipidemia, paroxysmal atrial fibrillation (on Eliquis anticoagulation).  BMI 30.  No tobacco user.    Patient is doing well.  Exercises regularly.  BP is 121/78, pulse 70 bpm.      Pacemaker interrogation shows 97% ventricular paced, 53% atrial paced, 14% paroxysmal atrial fibrillation.    I personally reviewed transthoracic echocardiogram dated 3/15/2023.  The bioprosthetic aortic valve is well-seated with a mean gradient of 10-12 mmHg, mild periprosthetic regurgitation.  Normal LVEF of 60%.  Mild concentric left ventricle hypertrophy.  Normal right ventricular size and systolic function.  Pacemaker lead in the right ventricle.  Trace tricuspid and mitral valve regurgitation.    I reviewed labs, echocardiogram, coronary angiogram dated 11/29/2022 and TAVR procedure note dated 2/7/2023.    Cardiac auscultation is normal.  No audible murmur.    DIAGNOSES/ASSESSMENT:  1.  Status post transcatheter bioprosthetic aortic valve replacement on 2/7/2023.  Well-functioning valve.  Normal LVEF.  No complaints.  2.   "Status post dual-chamber pacemaker implanted on 2/13/2020.  Well-functioning.  No issues.  3.  Paroxysmal atrial fibrillation.  Continue long-term apixaban anticoagulation.  4.  Benign essential hypertension.  Well-controlled on lisinopril 10 mg 2 times daily.  7.  Hyperlipidemia with goal LDL less than 70.  LDL is 65.  Continue rosuvastatin 20 mg daily.    PLAN:  1.  Prescriptions renewed.  2.  All test discussed in detail with patient.  3.  Follow-up with me in 1 year with previsit echocardiogram and fasting labs.      Kulwinder Villatoro MD      Established patient.   40 minutes spent by me on the date of the encounter doing chart review, history and exam, documentation and further activities per the note          PHYSICAL EXAMINATION:  Vitals: /78   Pulse 70   Resp 17   Ht 1.727 m (5' 8\")   Wt 90.7 kg (200 lb)   SpO2 100%   BMI 30.41 kg/m    Wt Readings from Last 5 Encounters:   08/30/23 90.7 kg (200 lb)   03/15/23 84.9 kg (187 lb 3.2 oz)   02/28/23 84.4 kg (186 lb)   02/13/23 83.1 kg (183 lb 3.2 oz)   02/07/23 86.2 kg (190 lb)     CARDIOVASCULAR:  Regular heart sounds.  No murmur. No carotid bruit.  RESPIRATORY:  Normal breath sounds. No rales or wheeze.  EXTREMITIES:  No edema.      Encounter Diagnoses   Name Primary?    S/p TAVR (transcatheter aortic valve replacement), bioprosthetic Yes    Benign essential hypertension     Paroxysmal atrial fibrillation (H)     Cardiac pacemaker in situ     Hyperlipidemia LDL goal <70          Orders Placed This Encounter   Procedures    CBC with platelets    Comprehensive metabolic panel    Lipid Profile    Follow-Up with Cardiology    Echocardiogram Complete           CURRENT MEDICATIONS:  Current Outpatient Medications   Medication Sig Dispense Refill    amoxicillin (AMOXIL) 500 MG capsule Take 4 capsules (2,000 mg) by mouth as needed (30-60 minutes prior to dental procedures) 4 capsule 11    apixaban ANTICOAGULANT (ELIQUIS ANTICOAGULANT) 5 MG tablet Take 1 " tablet (5 mg) by mouth 2 times daily 200 tablet 6    doxazosin (CARDURA) 4 MG tablet Take 2 mg by mouth At Bedtime (4MG X 0.5 = 2MG)      empagliflozin (JARDIANCE) 10 MG TABS tablet Take 10 mg by mouth daily      glucosamine 500 MG CAPS capsule Take 500 mg by mouth daily      lisinopril (ZESTRIL) 10 MG tablet Take 1 tablet (10 mg) by mouth 2 times daily 200 tablet 6    Multiple Vitamins-Minerals (ZINC PO) Take 1 tablet by mouth daily Patient unsure of dose      polyethylene glycol (MIRALAX) 17 GM/Dose powder Take 1 capful by mouth daily as needed for constipation      rosuvastatin (CRESTOR) 20 MG tablet Take 10 mg by mouth daily (20MG X 0.5 = 10MG)      tadalafil (CIALIS) 20 MG tablet Take 20 mg by mouth daily as needed      vitamin C with B complex (B COMPLEX-C) tablet Take 1 tablet by mouth daily      vitamin D3 (CHOLECALCIFEROL) 50 mcg (2000 units) tablet Take 1 tablet by mouth daily      vitamin E 400 UNIT capsule Take 400 Units by mouth daily           ALLERGIES:  No Known Allergies    PAST MEDICAL HISTORY:    Past Medical History:   Diagnosis Date    Aortic valve stenosis     Esophagitis, reflux     Hyperlipidemia     Hypertension     Lumbar disc disorder with myelopathy     bilateral leg weakness    RBBB (right bundle branch block)     S/p TAVR (transcatheter aortic valve replacement), bioprosthetic 2/7/2023    Severe aortic valve stenosis     Type 2 diabetes mellitus (H)        PAST SURGICAL HISTORY:    Past Surgical History:   Procedure Laterality Date    ANGIOGRAM      Patient reports coronary angiogram at [place], [place] approx 10 years ago    CV CORONARY ANGIOGRAM N/A 11/29/2022    Procedure: Coronary Angiogram;  Surgeon: Edwin Meadows MD;  Location: [facility] CARDIAC CATH LAB    CV TRANSCATHETER AORTIC VALVE REPLACEMENT-FEMORAL APPROACH N/A 2/7/2023    Procedure: Transcatheter Aortic Valve Replacement-Femoral Approach;  Surgeon: Juan Jang MD;  Location: [facility] CARDIAC CATH LAB     EP PACEMAKER DEVICE & LEAD IMPLANT- RIGHT ATRIAL & RIGHT VENTRICULAR N/A 2/13/2023    Procedure: Pacemaker Device & Lead Implant - Right Atrial & Right Ventricular;  Surgeon: Felix Pabon MD;  Location:  HEART CARDIAC CATH LAB       FAMILY HISTORY:    Family History   Problem Relation Age of Onset    Hypertension Mother         valve replacement     Coronary Artery Disease Father        SOCIAL HISTORY:    Social History     Socioeconomic History    Marital status:      Spouse name: Karissa   Occupational History    Occupation: Small business owner   Tobacco Use    Smoking status: Never    Smokeless tobacco: Never   Substance and Sexual Activity    Alcohol use: Yes     Alcohol/week: 4.0 standard drinks of alcohol     Types: 4 Drinks containing 0.5 oz of alcohol per week     Comment: 1-2 a week     Drug use: Never       Thank you for allowing me to participate in the care of your patient.      Sincerely,     Kulwinder Villatoro MD     [facility]  cc:   Juan Jang MD  [address]  [place] [address]

## 2023-10-19 ENCOUNTER — ANCILLARY PROCEDURE (OUTPATIENT)
Dept: CARDIOLOGY | Facility: CLINIC | Age: 83
End: 2023-10-19
Attending: INTERNAL MEDICINE
Payer: MEDICARE

## 2023-10-19 DIAGNOSIS — Z95.0 CARDIAC PACEMAKER IN SITU: ICD-10-CM

## 2023-10-19 PROCEDURE — 93294 REM INTERROG EVL PM/LDLS PM: CPT | Performed by: INTERNAL MEDICINE

## 2023-10-19 PROCEDURE — 93296 REM INTERROG EVL PM/IDS: CPT | Performed by: INTERNAL MEDICINE

## 2023-10-26 LAB
MDC_IDC_LEAD_CONNECTION_STATUS: NORMAL
MDC_IDC_LEAD_CONNECTION_STATUS: NORMAL
MDC_IDC_LEAD_IMPLANT_DT: NORMAL
MDC_IDC_LEAD_IMPLANT_DT: NORMAL
MDC_IDC_LEAD_LOCATION: NORMAL
MDC_IDC_LEAD_LOCATION: NORMAL
MDC_IDC_LEAD_LOCATION_DETAIL_1: NORMAL
MDC_IDC_LEAD_LOCATION_DETAIL_1: NORMAL
MDC_IDC_LEAD_MFG: NORMAL
MDC_IDC_LEAD_MFG: NORMAL
MDC_IDC_LEAD_MODEL: NORMAL
MDC_IDC_LEAD_MODEL: NORMAL
MDC_IDC_LEAD_POLARITY_TYPE: NORMAL
MDC_IDC_LEAD_POLARITY_TYPE: NORMAL
MDC_IDC_LEAD_SERIAL: NORMAL
MDC_IDC_LEAD_SERIAL: NORMAL
MDC_IDC_MSMT_BATTERY_REMAINING_PERCENTAGE: 90 %
MDC_IDC_MSMT_BATTERY_STATUS: NORMAL
MDC_IDC_MSMT_LEADCHNL_RA_IMPEDANCE_VALUE: 615 OHM
MDC_IDC_MSMT_LEADCHNL_RA_LEAD_CHANNEL_STATUS: NORMAL
MDC_IDC_MSMT_LEADCHNL_RV_IMPEDANCE_VALUE: 581 OHM
MDC_IDC_MSMT_LEADCHNL_RV_LEAD_CHANNEL_STATUS: NORMAL
MDC_IDC_MSMT_LEADCHNL_RV_PACING_THRESHOLD_AMPLITUDE: 0.7 V
MDC_IDC_MSMT_LEADCHNL_RV_PACING_THRESHOLD_PULSEWIDTH: 0.4 MS
MDC_IDC_PG_IMPLANT_DTM: NORMAL
MDC_IDC_PG_MFG: NORMAL
MDC_IDC_PG_MODEL: NORMAL
MDC_IDC_PG_SERIAL: NORMAL
MDC_IDC_PG_TYPE: NORMAL
MDC_IDC_SESS_CLINIC_NAME: NORMAL
MDC_IDC_SESS_DTM: NORMAL
MDC_IDC_SESS_REPROGRAMMED: NO
MDC_IDC_SESS_TYPE: NORMAL
MDC_IDC_SET_BRADY_AT_MODE_SWITCH_MODE: NORMAL
MDC_IDC_SET_BRADY_AT_MODE_SWITCH_RATE: 180 {BEATS}/MIN
MDC_IDC_SET_BRADY_LOWRATE: 60 {BEATS}/MIN
MDC_IDC_SET_BRADY_MAX_SENSOR_RATE: 120 {BEATS}/MIN
MDC_IDC_SET_BRADY_MAX_TRACKING_RATE: 130 {BEATS}/MIN
MDC_IDC_SET_BRADY_MODE: NORMAL
MDC_IDC_SET_BRADY_PAV_DELAY_HIGH: 160 MS
MDC_IDC_SET_BRADY_PAV_DELAY_LOW: 200 MS
MDC_IDC_SET_BRADY_SAV_DELAY_HIGH: 130 MS
MDC_IDC_SET_BRADY_SAV_DELAY_LOW: 170 MS
MDC_IDC_SET_LEADCHNL_RA_PACING_AMPLITUDE: 3 V
MDC_IDC_SET_LEADCHNL_RA_PACING_POLARITY: NORMAL
MDC_IDC_SET_LEADCHNL_RA_PACING_PULSEWIDTH: 0.4 MS
MDC_IDC_SET_LEADCHNL_RA_SENSING_ADAPTATION_MODE: NORMAL
MDC_IDC_SET_LEADCHNL_RA_SENSING_POLARITY: NORMAL
MDC_IDC_SET_LEADCHNL_RV_PACING_AMPLITUDE: 1.2 V
MDC_IDC_SET_LEADCHNL_RV_PACING_POLARITY: NORMAL
MDC_IDC_SET_LEADCHNL_RV_PACING_PULSEWIDTH: 0.4 MS
MDC_IDC_SET_LEADCHNL_RV_SENSING_ADAPTATION_MODE: NORMAL
MDC_IDC_SET_LEADCHNL_RV_SENSING_POLARITY: NORMAL
MDC_IDC_STAT_AT_BURDEN_PERCENT: 0 %
MDC_IDC_STAT_AT_DTM_END: NORMAL
MDC_IDC_STAT_AT_DTM_START: NORMAL
MDC_IDC_STAT_AT_MODE_SW_COUNT_PER_DAY: 0
MDC_IDC_STAT_AT_MODE_SW_PERCENT_TIME_PER_DAY: 0 %
MDC_IDC_STAT_BRADY_AP_VP_PERCENT: 62 %
MDC_IDC_STAT_BRADY_AP_VS_PERCENT: 0 %
MDC_IDC_STAT_BRADY_AS_VP_PERCENT: 38 %
MDC_IDC_STAT_BRADY_AS_VS_PERCENT: 0 %
MDC_IDC_STAT_BRADY_DTM_END: NORMAL
MDC_IDC_STAT_BRADY_DTM_START: NORMAL
MDC_IDC_STAT_BRADY_RA_PERCENT_PACED: 62 %
MDC_IDC_STAT_BRADY_RV_PERCENT_PACED: 100 %
MDC_IDC_STAT_CRT_DTM_END: NORMAL
MDC_IDC_STAT_CRT_DTM_START: NORMAL

## 2024-02-22 ENCOUNTER — ANCILLARY PROCEDURE (OUTPATIENT)
Dept: CARDIOLOGY | Facility: CLINIC | Age: 84
End: 2024-02-22
Attending: INTERNAL MEDICINE
Payer: MEDICARE

## 2024-02-22 DIAGNOSIS — Z95.0 CARDIAC PACEMAKER IN SITU: ICD-10-CM

## 2024-02-22 PROCEDURE — 93294 REM INTERROG EVL PM/LDLS PM: CPT | Performed by: INTERNAL MEDICINE

## 2024-02-22 PROCEDURE — 93296 REM INTERROG EVL PM/IDS: CPT | Performed by: INTERNAL MEDICINE

## 2024-02-23 LAB
MDC_IDC_LEAD_CONNECTION_STATUS: NORMAL
MDC_IDC_LEAD_CONNECTION_STATUS: NORMAL
MDC_IDC_LEAD_IMPLANT_DT: NORMAL
MDC_IDC_LEAD_IMPLANT_DT: NORMAL
MDC_IDC_LEAD_LOCATION: NORMAL
MDC_IDC_LEAD_LOCATION: NORMAL
MDC_IDC_LEAD_LOCATION_DETAIL_1: NORMAL
MDC_IDC_LEAD_LOCATION_DETAIL_1: NORMAL
MDC_IDC_LEAD_MFG: NORMAL
MDC_IDC_LEAD_MFG: NORMAL
MDC_IDC_LEAD_MODEL: NORMAL
MDC_IDC_LEAD_MODEL: NORMAL
MDC_IDC_LEAD_POLARITY_TYPE: NORMAL
MDC_IDC_LEAD_POLARITY_TYPE: NORMAL
MDC_IDC_LEAD_SERIAL: NORMAL
MDC_IDC_LEAD_SERIAL: NORMAL
MDC_IDC_MSMT_BATTERY_DTM: NORMAL
MDC_IDC_MSMT_BATTERY_REMAINING_PERCENTAGE: 90 %
MDC_IDC_MSMT_BATTERY_STATUS: NORMAL
MDC_IDC_MSMT_LEADCHNL_RA_IMPEDANCE_VALUE: 605 OHM
MDC_IDC_MSMT_LEADCHNL_RA_LEAD_CHANNEL_STATUS: NORMAL
MDC_IDC_MSMT_LEADCHNL_RV_IMPEDANCE_VALUE: 566 OHM
MDC_IDC_MSMT_LEADCHNL_RV_LEAD_CHANNEL_STATUS: NORMAL
MDC_IDC_MSMT_LEADCHNL_RV_PACING_THRESHOLD_AMPLITUDE: 0.6 V
MDC_IDC_MSMT_LEADCHNL_RV_PACING_THRESHOLD_PULSEWIDTH: 0.4 MS
MDC_IDC_PG_IMPLANT_DTM: NORMAL
MDC_IDC_PG_MFG: NORMAL
MDC_IDC_PG_MODEL: NORMAL
MDC_IDC_PG_SERIAL: NORMAL
MDC_IDC_PG_TYPE: NORMAL
MDC_IDC_SESS_CLINIC_NAME: NORMAL
MDC_IDC_SESS_DTM: NORMAL
MDC_IDC_SESS_REPROGRAMMED: NO
MDC_IDC_SESS_TYPE: NORMAL
MDC_IDC_SET_BRADY_AT_MODE_SWITCH_MODE: NORMAL
MDC_IDC_SET_BRADY_AT_MODE_SWITCH_RATE: 180 {BEATS}/MIN
MDC_IDC_SET_BRADY_LOWRATE: 60 {BEATS}/MIN
MDC_IDC_SET_BRADY_MAX_SENSOR_RATE: 120 {BEATS}/MIN
MDC_IDC_SET_BRADY_MAX_TRACKING_RATE: 130 {BEATS}/MIN
MDC_IDC_SET_BRADY_MODE: NORMAL
MDC_IDC_SET_BRADY_PAV_DELAY_HIGH: 160 MS
MDC_IDC_SET_BRADY_PAV_DELAY_LOW: 200 MS
MDC_IDC_SET_BRADY_SAV_DELAY_HIGH: 130 MS
MDC_IDC_SET_BRADY_SAV_DELAY_LOW: 170 MS
MDC_IDC_SET_LEADCHNL_RA_PACING_AMPLITUDE: 3 V
MDC_IDC_SET_LEADCHNL_RA_PACING_ANODE_ELECTRODE_1: NORMAL
MDC_IDC_SET_LEADCHNL_RA_PACING_ANODE_LOCATION_1: NORMAL
MDC_IDC_SET_LEADCHNL_RA_PACING_CAPTURE_MODE: NORMAL
MDC_IDC_SET_LEADCHNL_RA_PACING_CATHODE_ELECTRODE_1: NORMAL
MDC_IDC_SET_LEADCHNL_RA_PACING_CATHODE_LOCATION_1: NORMAL
MDC_IDC_SET_LEADCHNL_RA_PACING_POLARITY: NORMAL
MDC_IDC_SET_LEADCHNL_RA_PACING_PULSEWIDTH: 0.4 MS
MDC_IDC_SET_LEADCHNL_RA_SENSING_ADAPTATION_MODE: NORMAL
MDC_IDC_SET_LEADCHNL_RA_SENSING_ANODE_ELECTRODE_1: NORMAL
MDC_IDC_SET_LEADCHNL_RA_SENSING_ANODE_LOCATION_1: NORMAL
MDC_IDC_SET_LEADCHNL_RA_SENSING_CATHODE_ELECTRODE_1: NORMAL
MDC_IDC_SET_LEADCHNL_RA_SENSING_CATHODE_LOCATION_1: NORMAL
MDC_IDC_SET_LEADCHNL_RA_SENSING_POLARITY: NORMAL
MDC_IDC_SET_LEADCHNL_RV_PACING_AMPLITUDE: 1.1 V
MDC_IDC_SET_LEADCHNL_RV_PACING_ANODE_ELECTRODE_1: NORMAL
MDC_IDC_SET_LEADCHNL_RV_PACING_ANODE_LOCATION_1: NORMAL
MDC_IDC_SET_LEADCHNL_RV_PACING_CAPTURE_MODE: NORMAL
MDC_IDC_SET_LEADCHNL_RV_PACING_CATHODE_ELECTRODE_1: NORMAL
MDC_IDC_SET_LEADCHNL_RV_PACING_CATHODE_LOCATION_1: NORMAL
MDC_IDC_SET_LEADCHNL_RV_PACING_POLARITY: NORMAL
MDC_IDC_SET_LEADCHNL_RV_PACING_PULSEWIDTH: 0.4 MS
MDC_IDC_SET_LEADCHNL_RV_SENSING_ADAPTATION_MODE: NORMAL
MDC_IDC_SET_LEADCHNL_RV_SENSING_ANODE_ELECTRODE_1: NORMAL
MDC_IDC_SET_LEADCHNL_RV_SENSING_ANODE_LOCATION_1: NORMAL
MDC_IDC_SET_LEADCHNL_RV_SENSING_CATHODE_ELECTRODE_1: NORMAL
MDC_IDC_SET_LEADCHNL_RV_SENSING_CATHODE_LOCATION_1: NORMAL
MDC_IDC_SET_LEADCHNL_RV_SENSING_POLARITY: NORMAL
MDC_IDC_STAT_AT_BURDEN_PERCENT: 13 %
MDC_IDC_STAT_AT_DTM_END: NORMAL
MDC_IDC_STAT_AT_DTM_START: NORMAL
MDC_IDC_STAT_AT_MODE_SW_COUNT_PER_DAY: 56
MDC_IDC_STAT_AT_MODE_SW_PERCENT_TIME_PER_DAY: 12 %
MDC_IDC_STAT_BRADY_AP_VP_PERCENT: 60 %
MDC_IDC_STAT_BRADY_AP_VS_PERCENT: 0 %
MDC_IDC_STAT_BRADY_AS_VP_PERCENT: 39 %
MDC_IDC_STAT_BRADY_AS_VS_PERCENT: 0 %
MDC_IDC_STAT_BRADY_DTM_END: NORMAL
MDC_IDC_STAT_BRADY_DTM_START: NORMAL
MDC_IDC_STAT_BRADY_RA_PERCENT_PACED: 52 %
MDC_IDC_STAT_BRADY_RV_PERCENT_PACED: 98 %
MDC_IDC_STAT_HEART_RATE_ATRIAL_MEAN: 102 {BEATS}/MIN
MDC_IDC_STAT_HEART_RATE_DTM_END: NORMAL
MDC_IDC_STAT_HEART_RATE_DTM_START: NORMAL
MDC_IDC_STAT_HEART_RATE_VENTRICULAR_MEAN: 73 {BEATS}/MIN

## 2024-03-06 ENCOUNTER — TELEPHONE (OUTPATIENT)
Dept: CARDIOLOGY | Facility: CLINIC | Age: 84
End: 2024-03-06
Payer: MEDICARE

## 2024-03-06 NOTE — TELEPHONE ENCOUNTER
Patient called from [place]. He has been having extensive dental work and a new tooth has broken. He wonders if he should be on antibiotics until he gets back to [place] on 3/12/2024.  Reviewed with patient to plan SBE with all the dental visits. Reviewed that an Urgent Care center in [place] could assess his tooth and provide temporary assistance. He prefers to wait until he is back in [place]. Reviewed with patient to call his [place] dental team to plan appointments now and not wait until he returns. Patient states he will think about this.

## 2024-03-06 NOTE — TELEPHONE ENCOUNTER
[facility] Call Center    Phone Message    May a detailed message be left on voicemail: yes     Reason for Call: Medication Question or concern regarding medication   Prescription Clarification  Name of Medication: Antibiotic question  Prescribing Provider: Dr Villatoro   Pharmacy:    [id number] - [place], [place] - [address] [facility] [address]       What on the order needs clarification? The patient broke a tooth and he would like to know if he needs to be taking an antibiotic?      Action Taken: Other: Cardiology    Travel Screening: Not Applicable      Thank you!  [facility]

## 2024-03-21 DIAGNOSIS — I48.0 PAF (PAROXYSMAL ATRIAL FIBRILLATION) (H): Primary | ICD-10-CM

## 2024-03-21 DIAGNOSIS — I10 BENIGN ESSENTIAL HYPERTENSION: ICD-10-CM

## 2024-03-25 ENCOUNTER — LAB (OUTPATIENT)
Dept: LAB | Facility: CLINIC | Age: 84
End: 2024-03-25
Attending: INTERNAL MEDICINE
Payer: MEDICARE

## 2024-03-25 ENCOUNTER — OFFICE VISIT (OUTPATIENT)
Dept: CARDIOLOGY | Facility: CLINIC | Age: 84
End: 2024-03-25
Attending: INTERNAL MEDICINE
Payer: MEDICARE

## 2024-03-25 ENCOUNTER — HOSPITAL ENCOUNTER (OUTPATIENT)
Dept: CARDIOLOGY | Facility: CLINIC | Age: 84
Discharge: HOME | End: 2024-03-25
Attending: INTERNAL MEDICINE | Admitting: INTERNAL MEDICINE
Payer: MEDICARE

## 2024-03-25 VITALS
BODY MASS INDEX: 30.86 KG/M2 | OXYGEN SATURATION: 96 % | SYSTOLIC BLOOD PRESSURE: 138 MMHG | WEIGHT: 203.6 LBS | HEIGHT: 68 IN | HEART RATE: 80 BPM | DIASTOLIC BLOOD PRESSURE: 76 MMHG

## 2024-03-25 DIAGNOSIS — I48.0 PAF (PAROXYSMAL ATRIAL FIBRILLATION) (H): ICD-10-CM

## 2024-03-25 DIAGNOSIS — Z29.89 NEED FOR SBE (SUBACUTE BACTERIAL ENDOCARDITIS) PROPHYLAXIS: ICD-10-CM

## 2024-03-25 DIAGNOSIS — Z95.3 S/P TAVR (TRANSCATHETER AORTIC VALVE REPLACEMENT), BIOPROSTHETIC: ICD-10-CM

## 2024-03-25 DIAGNOSIS — I10 BENIGN ESSENTIAL HYPERTENSION: ICD-10-CM

## 2024-03-25 DIAGNOSIS — Z95.3 S/P TAVR (TRANSCATHETER AORTIC VALVE REPLACEMENT), BIOPROSTHETIC: Primary | ICD-10-CM

## 2024-03-25 LAB
ALBUMIN SERPL BCG-MCNC: 4.5 G/DL (ref 3.5–5.2)
ALP SERPL-CCNC: 102 U/L (ref 40–150)
ALT SERPL W P-5'-P-CCNC: 35 U/L (ref 0–70)
ANION GAP SERPL CALCULATED.3IONS-SCNC: 14 MMOL/L (ref 7–15)
AST SERPL W P-5'-P-CCNC: 33 U/L (ref 0–45)
BILIRUB SERPL-MCNC: 0.4 MG/DL
BUN SERPL-MCNC: 16.9 MG/DL (ref 8–23)
CALCIUM SERPL-MCNC: 9.3 MG/DL (ref 8.8–10.2)
CHLORIDE SERPL-SCNC: 104 MMOL/L (ref 98–107)
CHOLEST SERPL-MCNC: 127 MG/DL
CREAT SERPL-MCNC: 0.79 MG/DL (ref 0.67–1.17)
DEPRECATED HCO3 PLAS-SCNC: 23 MMOL/L (ref 22–29)
EGFRCR SERPLBLD CKD-EPI 2021: 89 ML/MIN/1.73M2
ERYTHROCYTE [DISTWIDTH] IN BLOOD BY AUTOMATED COUNT: 13.2 % (ref 10–15)
FASTING STATUS PATIENT QL REPORTED: YES
GLUCOSE SERPL-MCNC: 154 MG/DL (ref 70–99)
HCT VFR BLD AUTO: 46.3 % (ref 40–53)
HDLC SERPL-MCNC: 34 MG/DL
HGB BLD-MCNC: 15.5 G/DL (ref 13.3–17.7)
LDLC SERPL CALC-MCNC: 59 MG/DL
LVEF ECHO: NORMAL
MCH RBC QN AUTO: 30 PG (ref 26.5–33)
MCHC RBC AUTO-ENTMCNC: 33.5 G/DL (ref 31.5–36.5)
MCV RBC AUTO: 90 FL (ref 78–100)
NONHDLC SERPL-MCNC: 93 MG/DL
PLATELET # BLD AUTO: 142 10E3/UL (ref 150–450)
POTASSIUM SERPL-SCNC: 4.8 MMOL/L (ref 3.4–5.3)
PROT SERPL-MCNC: 7.6 G/DL (ref 6.4–8.3)
RBC # BLD AUTO: 5.16 10E6/UL (ref 4.4–5.9)
SODIUM SERPL-SCNC: 141 MMOL/L (ref 135–145)
TRIGL SERPL-MCNC: 169 MG/DL
WBC # BLD AUTO: 6.3 10E3/UL (ref 4–11)

## 2024-03-25 PROCEDURE — 36415 COLL VENOUS BLD VENIPUNCTURE: CPT | Performed by: INTERNAL MEDICINE

## 2024-03-25 PROCEDURE — 93306 TTE W/DOPPLER COMPLETE: CPT

## 2024-03-25 PROCEDURE — 80053 COMPREHEN METABOLIC PANEL: CPT | Performed by: INTERNAL MEDICINE

## 2024-03-25 PROCEDURE — 93306 TTE W/DOPPLER COMPLETE: CPT | Mod: 26 | Performed by: INTERNAL MEDICINE

## 2024-03-25 PROCEDURE — 80061 LIPID PANEL: CPT | Performed by: INTERNAL MEDICINE

## 2024-03-25 PROCEDURE — 85027 COMPLETE CBC AUTOMATED: CPT | Performed by: INTERNAL MEDICINE

## 2024-03-25 PROCEDURE — 99214 OFFICE O/P EST MOD 30 MIN: CPT | Performed by: NURSE PRACTITIONER

## 2024-03-25 PROCEDURE — 93000 ELECTROCARDIOGRAM COMPLETE: CPT | Performed by: NURSE PRACTITIONER

## 2024-03-25 RX ORDER — AMOXICILLIN 500 MG/1
2000 CAPSULE ORAL PRN
Qty: 4 CAPSULE | Refills: 11 | Status: SHIPPED | OUTPATIENT
Start: 2024-03-25

## 2024-03-25 NOTE — LETTER
3/25/2024    MICHELLE WATKINS  [facility] [address]  [place] [address]    RE: Reynaldo Monteiro       Dear Colleague,     I had the pleasure of seeing Reynaldo Monteiro in the [facility].  Cardiology Clinic Progress Note  Reynaldo Monteiro MRN# [id number]   YOB: 1940 Age: 84 year old     Primary cardiologist: Dr. Villatoro      Reason for visit: s/p TAVR     History of presenting illness:    Reynaldo Monteiro is a pleasant 84 year old patient with past medical history significant for type 2 diabetes, hypertension, hyperlipidemia, chronic diastolic heart failure, and severe aortic stenosis s/p TAVR with 29 mm Villanueva Davi 3 valve (2/7/2023) c/b complete heart block noted on event monitor s/p PPM (2/14/24), who presents today for follow-up.     He was subsequently noted to be in atrial fibrillation per device check.  He was appropriately started on Eliquis for [id number] score of 6.    Recent device interrogation shows 99% ventricular paced, 52% atrial paced, and 13% the time he is in atrial fibrillation.    Today Spencer is doing well from a cardiovascular standpoint.  He is walking 2 miles a day without any exertional symptoms.  He denies chest pain, shortness of breath, syncope or near syncope, or palpitations.  He has no PND or orthopnea.      His blood pressure is well-controlled. EKG in clinic shows sinus rhythm.     I reviewed his echocardiogram today that showed well-seated bioprosthetic aortic valve with a mean gradient of 9 mmHg, no AI, and normal LVEF.    Labs today are overall unremarkable.  His CBC and BMP are essentially normal.  Blood glucose level 154.         Assessment and Plan:     ASSESSMENT:    Severe aortic stenosis s/p TAVR with 29 mm Villanueva Davi 3 valve.  Echocardiogram shows normal functioning valve, no AI or PVL.  He remains active without any exertional symptoms.  Chronic diastolic heart failure, NYHA class I.  Euvolemic on exam, not on diuretic  therapy.  Hypertension. Well-controlled on lisinopril 10 mg twice daily.  Hyperlipidemia with goal LDL < 100. At goal on rosuvastatin 10 mg daily.   Complete AV block s/p PPM. Most recent device check shows normal function, 99%  and 13% burden of Afib.   Type 2 diabetes with A1c 6.4.  His blood sugars elevated today, though labs were not fasting.  Paroxysmal atrial fibrillation. On Eliquis for anticoagulation.     PLAN:     Overall Spencer is doing well from a cardiovascular standpoint, I have made no changes to his regimen today.  He will continue Eliquis, Jardiance, lisinopril, and rosuvastatin.  He will need lifelong antibiotic prophylaxis prior to any dental procedure.  Repeat echocardiogram in 1 year for surveillance of his aortic valve.  Follow-up with Dr. Villatoro, as planned.         Zeenat Fox, DNP, APRN, CNP  Page: [phone] (8a-5p M-F)    Orders this Visit:  No orders of the defined types were placed in this encounter.    Orders Placed This Encounter   Medications    amoxicillin (AMOXIL) 500 MG capsule     Sig: Take 4 capsules (2,000 mg) by mouth as needed (30-60 minutes prior to dental procedures)     Dispense:  4 capsule     Refill:  11     Medications Discontinued During This Encounter   Medication Reason    amoxicillin (AMOXIL) 500 MG capsule Reorder (No AVS)       Today's clinic visit entailed:  Review of the result(s) of each unique test - echo, labs, EKG  Ordering of each unique test  Prescription drug management  35 minutes spent on the date of the encounter doing chart review, review of test results, interpretation of tests, patient visit and documentation   Provider  Link to [facility] Help Grid     The level of medical decision making during this visit was of moderate complexity.           Review of Systems:     Review of Systems:  Skin:  Positive for bruising   Eyes:  Positive for glasses  ENT:       Respiratory:  Negative dyspnea on exertion;shortness of breath;cough;wheezing;sleep  "apnea  Cardiovascular:  Negative;palpitations;chest pain;edema;syncope or near-syncope;dizziness fatigue;Positive for;lightheadedness  Gastroenterology:      Genitourinary:       Musculoskeletal:  Positive for neck pain;arthritis  Neurologic:       Psychiatric:       Heme/Lymph/Imm:  Negative allergies  Endocrine:  Negative thyroid disorder;diabetes            Physical Exam:   Vitals: /76   Pulse 80   Ht 1.727 m (5' 8\")   Wt 92.4 kg (203 lb 9.6 oz)   SpO2 96%   BMI 30.96 kg/m    Constitutional:  cooperative        Skin:  warm and dry to the touch        Head:  normocephalic        Eyes:  pupils equal and round        ENT:  not assessed this visit        Neck:  JVP normal        Chest:  normal breath sounds, clear to auscultation, normal A-P diameter, normal symmetry, normal respiratory excursion, no use of accessory muscles        Cardiac:   irregularly irregular rhythm     systolic ejection murmur;grade 1          Abdomen:  abdomen soft        Vascular: pulses full and equal                                      Extremities and Back:  no edema        Neurological:  no gross motor deficits;affect appropriate             Medications:     Current Outpatient Medications   Medication Sig Dispense Refill    amoxicillin (AMOXIL) 500 MG capsule Take 4 capsules (2,000 mg) by mouth as needed (30-60 minutes prior to dental procedures) 4 capsule 11    apixaban ANTICOAGULANT (ELIQUIS ANTICOAGULANT) 5 MG tablet Take 1 tablet (5 mg) by mouth 2 times daily 200 tablet 6    doxazosin (CARDURA) 4 MG tablet Take 2 mg by mouth At Bedtime (4MG X 0.5 = 2MG)      empagliflozin (JARDIANCE) 10 MG TABS tablet Take 10 mg by mouth daily      glucosamine 500 MG CAPS capsule Take 500 mg by mouth daily      lisinopril (ZESTRIL) 10 MG tablet Take 1 tablet (10 mg) by mouth 2 times daily 200 tablet 6    Multiple Vitamins-Minerals (ZINC PO) Take 1 tablet by mouth daily Patient unsure of dose      polyethylene glycol (MIRALAX) 17 GM/Dose " powder Take 1 capful by mouth daily as needed for constipation      rosuvastatin (CRESTOR) 20 MG tablet Take 10 mg by mouth daily (20MG X 0.5 = 10MG)      tadalafil (CIALIS) 20 MG tablet Take 20 mg by mouth daily as needed      vitamin C with B complex (B COMPLEX-C) tablet Take 1 tablet by mouth daily      vitamin D3 (CHOLECALCIFEROL) 50 mcg (2000 units) tablet Take 1 tablet by mouth daily      vitamin E 400 UNIT capsule Take 400 Units by mouth daily         Family History   Problem Relation Age of Onset    Hypertension Mother         valve replacement     Coronary Artery Disease Father        Social History     Socioeconomic History    Marital status:      Spouse name: Karissa    Number of children: Not on file    Years of education: Not on file    Highest education level: Not on file   Occupational History    Occupation: Small business owner   Tobacco Use    Smoking status: Never    Smokeless tobacco: Never   Substance and Sexual Activity    Alcohol use: Yes     Alcohol/week: 4.0 standard drinks of alcohol     Types: 4 Drinks containing 0.5 oz of alcohol per week     Comment: 1-2 a week     Drug use: Never    Sexual activity: Not on file   Other Topics Concern    Parent/sibling w/ CABG, MI or angioplasty before 65F 55M? Not Asked   Social History Narrative    Not on file     Social Determinants of Health     Financial Resource Strain: Not on file   Food Insecurity: Not on file   Transportation Needs: Not on file   Physical Activity: Not on file   Stress: Not on file   Social Connections: Not on file   Interpersonal Safety: Not on file   Housing Stability: Not on file            Past Medical History:     Past Medical History:   Diagnosis Date    Aortic valve stenosis     Esophagitis, reflux     Hyperlipidemia     Hypertension     Lumbar disc disorder with myelopathy     bilateral leg weakness    RBBB (right bundle branch block)     S/p TAVR (transcatheter aortic valve replacement), bioprosthetic 2/7/2023     Severe aortic valve stenosis     Type 2 diabetes mellitus (H)               Past Surgical History:     Past Surgical History:   Procedure Laterality Date    ANGIOGRAM      Patient reports coronary angiogram at [place], [place] approx 10 years ago    CV CORONARY ANGIOGRAM N/A 11/29/2022    Procedure: Coronary Angiogram;  Surgeon: Edwin Meadows MD;  Location: [facility] CARDIAC CATH LAB    CV TRANSCATHETER AORTIC VALVE REPLACEMENT-FEMORAL APPROACH N/A 2/7/2023    Procedure: Transcatheter Aortic Valve Replacement-Femoral Approach;  Surgeon: Juan Jang MD;  Location:  HEART CARDIAC CATH LAB    EP PACEMAKER DEVICE & LEAD IMPLANT- RIGHT ATRIAL & RIGHT VENTRICULAR N/A 2/13/2023    Procedure: Pacemaker Device & Lead Implant - Right Atrial & Right Ventricular;  Surgeon: Felix Pabon MD;  Location:  HEART CARDIAC CATH LAB              Allergies:   Patient has no known allergies.       Data:   All laboratory data reviewed:    Recent Labs   Lab Test 02/11/23  0818 01/31/23  1038 01/04/23  1014 10/04/22  1000 04/17/19  0000 09/04/18  0000   LDL  --   --   --   --  37 40   HDL  --   --   --   --  39 41   NHDL  --   --   --   --  57 57   CHOL  --   --   --   --  96 98   TRIG  --   --   --  132 112 86   TSH 1.39  --   --   --   --   --    NTBNP  --  145 81  --   --   --        Lab Results   Component Value Date    WBC 6.3 03/25/2024    WBC 6.8 10/03/2019    RBC 5.16 03/25/2024    RBC 4.94 10/03/2019    HGB 15.5 03/25/2024    HGB 15.1 10/03/2019    HCT 46.3 03/25/2024    HCT 45.1 10/03/2019    MCV 90 03/25/2024    MCV 91 10/03/2019    MCH 30.0 03/25/2024    MCH 30.6 10/03/2019    MCHC 33.5 03/25/2024    MCHC 33.5 10/03/2019    RDW 13.2 03/25/2024    RDW 12.8 10/03/2019     (L) 03/25/2024     10/03/2019       Lab Results   Component Value Date     03/25/2024     04/17/2019    POTASSIUM 4.8 03/25/2024    POTASSIUM 4.4 01/04/2023    POTASSIUM 4.4 04/17/2019    CHLORIDE 104  03/25/2024    CHLORIDE 106 01/04/2023    CHLORIDE 102 10/04/2022    CHLORIDE 104 04/17/2019    CO2 23 03/25/2024    CO2 25 01/04/2023    CO2 28 04/17/2019    ANIONGAP 14 03/25/2024    ANIONGAP 8 01/04/2023    ANIONGAP 11 10/20/2009     (H) 03/25/2024     (H) 02/14/2023     (H) 01/04/2023    GLC 88 04/17/2019    BUN 16.9 03/25/2024    BUN 18 01/04/2023    BUN 18 04/17/2019    CR 0.79 03/25/2024    CR 0.87 04/17/2019    GFRESTIMATED 89 03/25/2024    GFRESTIMATED 83 04/17/2019    GFRESTBLACK 96 04/17/2019    GIULIANO 9.3 03/25/2024    GIULIANO 9.2 04/17/2019      Lab Results   Component Value Date    AST 33 03/25/2024    AST 23 04/17/2019    ALT 35 03/25/2024    ALT 23 04/17/2019       Lab Results   Component Value Date    A1C 6.4 (H) 02/11/2023       Lab Results   Component Value Date    INR 0.98 01/31/2023    INR 1.08 01/04/2023       KCQ-12  5  5  5  5  3  7  5  5  4  5  4  5        Thank you for allowing me to participate in the care of your patient.      Sincerely,     Zeenat Fox, [facility]  cc:   Juan Jang MD  [address]  [place] [id number]

## 2024-03-25 NOTE — PATIENT INSTRUCTIONS
Today's Plan:     - Repeat echocardiogram in 1 year.   - Follow-up with Dr. Villatoro as planned.     If you have questions or concerns please call my nurse team:  Maryse RN ([phone]) and Michelle RN ([phone])    After Hours: [phone], option #2, ask for cardiologist on-call    Scheduling phone number: [phone]    Reminder: Please bring in all current medications, over the counter supplements and vitamin bottles to your next appointment.-    It was a pleasure seeing you today!     Zeenat Fox, RAND  3/25/2024    -

## 2024-03-25 NOTE — PROGRESS NOTES
Cardiology Clinic Progress Note  Reynaldo Monteiro MRN# [id number]   YOB: 1940 Age: 84 year old     Primary cardiologist: Dr. Villatoro      Reason for visit: s/p TAVR     History of presenting illness:    Reynaldo Monteiro is a pleasant 84 year old patient with past medical history significant for type 2 diabetes, hypertension, hyperlipidemia, chronic diastolic heart failure, and severe aortic stenosis s/p TAVR with 29 mm Villanueva Davi 3 valve (2/7/2023) c/b complete heart block noted on event monitor s/p PPM (2/14/24), who presents today for follow-up.     He was subsequently noted to be in atrial fibrillation per device check.  He was appropriately started on Eliquis for [id number] score of 6.    Recent device interrogation shows 99% ventricular paced, 52% atrial paced, and 13% the time he is in atrial fibrillation.    Today Spencer is doing well from a cardiovascular standpoint.  He is walking 2 miles a day without any exertional symptoms.  He denies chest pain, shortness of breath, syncope or near syncope, or palpitations.  He has no PND or orthopnea.      His blood pressure is well-controlled. EKG in clinic shows sinus rhythm.     I reviewed his echocardiogram today that showed well-seated bioprosthetic aortic valve with a mean gradient of 9 mmHg, no AI, and normal LVEF.    Labs today are overall unremarkable.  His CBC and BMP are essentially normal.  Blood glucose level 154.         Assessment and Plan:     ASSESSMENT:    Severe aortic stenosis s/p TAVR with 29 mm Villanueva Davi 3 valve.  Echocardiogram shows normal functioning valve, no AI or PVL.  He remains active without any exertional symptoms.  Chronic diastolic heart failure, NYHA class I.  Euvolemic on exam, not on diuretic therapy.  Hypertension. Well-controlled on lisinopril 10 mg twice daily.  Hyperlipidemia with goal LDL < 100. At goal on rosuvastatin 10 mg daily.   Complete AV block s/p PPM. Most recent device check shows normal  function, 99%  and 13% burden of Afib.   Type 2 diabetes with A1c 6.4.  His blood sugars elevated today, though labs were not fasting.  Paroxysmal atrial fibrillation. On Eliquis for anticoagulation.     PLAN:     Overall Spencer is doing well from a cardiovascular standpoint, I have made no changes to his regimen today.  He will continue Eliquis, Jardiance, lisinopril, and rosuvastatin.  He will need lifelong antibiotic prophylaxis prior to any dental procedure.  Repeat echocardiogram in 1 year for surveillance of his aortic valve.  Follow-up with Dr. Villatoro, as planned.         Zeenat Fox, DNP, APRN, CNP  Page: [phone] (8a-5p M-F)    Orders this Visit:  No orders of the defined types were placed in this encounter.    Orders Placed This Encounter   Medications    amoxicillin (AMOXIL) 500 MG capsule     Sig: Take 4 capsules (2,000 mg) by mouth as needed (30-60 minutes prior to dental procedures)     Dispense:  4 capsule     Refill:  11     Medications Discontinued During This Encounter   Medication Reason    amoxicillin (AMOXIL) 500 MG capsule Reorder (No AVS)       Today's clinic visit entailed:  Review of the result(s) of each unique test - echo, labs, EKG  Ordering of each unique test  Prescription drug management  35 minutes spent on the date of the encounter doing chart review, review of test results, interpretation of tests, patient visit and documentation   Provider  Link to [facility] Help Grid     The level of medical decision making during this visit was of moderate complexity.           Review of Systems:     Review of Systems:  Skin:  Positive for bruising   Eyes:  Positive for glasses  ENT:       Respiratory:  Negative dyspnea on exertion;shortness of breath;cough;wheezing;sleep apnea  Cardiovascular:  Negative;palpitations;chest pain;edema;syncope or near-syncope;dizziness fatigue;Positive for;lightheadedness  Gastroenterology:      Genitourinary:       Musculoskeletal:  Positive for neck  "pain;arthritis  Neurologic:       Psychiatric:       Heme/Lymph/Imm:  Negative allergies  Endocrine:  Negative thyroid disorder;diabetes            Physical Exam:   Vitals: /76   Pulse 80   Ht 1.727 m (5' 8\")   Wt 92.4 kg (203 lb 9.6 oz)   SpO2 96%   BMI 30.96 kg/m    Constitutional:  cooperative        Skin:  warm and dry to the touch        Head:  normocephalic        Eyes:  pupils equal and round        ENT:  not assessed this visit        Neck:  JVP normal        Chest:  normal breath sounds, clear to auscultation, normal A-P diameter, normal symmetry, normal respiratory excursion, no use of accessory muscles        Cardiac:   irregularly irregular rhythm     systolic ejection murmur;grade 1          Abdomen:  abdomen soft        Vascular: pulses full and equal                                      Extremities and Back:  no edema        Neurological:  no gross motor deficits;affect appropriate             Medications:     Current Outpatient Medications   Medication Sig Dispense Refill    amoxicillin (AMOXIL) 500 MG capsule Take 4 capsules (2,000 mg) by mouth as needed (30-60 minutes prior to dental procedures) 4 capsule 11    apixaban ANTICOAGULANT (ELIQUIS ANTICOAGULANT) 5 MG tablet Take 1 tablet (5 mg) by mouth 2 times daily 200 tablet 6    doxazosin (CARDURA) 4 MG tablet Take 2 mg by mouth At Bedtime (4MG X 0.5 = 2MG)      empagliflozin (JARDIANCE) 10 MG TABS tablet Take 10 mg by mouth daily      glucosamine 500 MG CAPS capsule Take 500 mg by mouth daily      lisinopril (ZESTRIL) 10 MG tablet Take 1 tablet (10 mg) by mouth 2 times daily 200 tablet 6    Multiple Vitamins-Minerals (ZINC PO) Take 1 tablet by mouth daily Patient unsure of dose      polyethylene glycol (MIRALAX) 17 GM/Dose powder Take 1 capful by mouth daily as needed for constipation      rosuvastatin (CRESTOR) 20 MG tablet Take 10 mg by mouth daily (20MG X 0.5 = 10MG)      tadalafil (CIALIS) 20 MG tablet Take 20 mg by mouth daily as " needed      vitamin C with B complex (B COMPLEX-C) tablet Take 1 tablet by mouth daily      vitamin D3 (CHOLECALCIFEROL) 50 mcg (2000 units) tablet Take 1 tablet by mouth daily      vitamin E 400 UNIT capsule Take 400 Units by mouth daily         Family History   Problem Relation Age of Onset    Hypertension Mother         valve replacement     Coronary Artery Disease Father        Social History     Socioeconomic History    Marital status:      Spouse name: Karissa    Number of children: Not on file    Years of education: Not on file    Highest education level: Not on file   Occupational History    Occupation: Small business owner   Tobacco Use    Smoking status: Never    Smokeless tobacco: Never   Substance and Sexual Activity    Alcohol use: Yes     Alcohol/week: 4.0 standard drinks of alcohol     Types: 4 Drinks containing 0.5 oz of alcohol per week     Comment: 1-2 a week     Drug use: Never    Sexual activity: Not on file   Other Topics Concern    Parent/sibling w/ CABG, MI or angioplasty before 65F 55M? Not Asked   Social History Narrative    Not on file     Social Determinants of Health     Financial Resource Strain: Not on file   Food Insecurity: Not on file   Transportation Needs: Not on file   Physical Activity: Not on file   Stress: Not on file   Social Connections: Not on file   Interpersonal Safety: Not on file   Housing Stability: Not on file            Past Medical History:     Past Medical History:   Diagnosis Date    Aortic valve stenosis     Esophagitis, reflux     Hyperlipidemia     Hypertension     Lumbar disc disorder with myelopathy     bilateral leg weakness    RBBB (right bundle branch block)     S/p TAVR (transcatheter aortic valve replacement), bioprosthetic 2/7/2023    Severe aortic valve stenosis     Type 2 diabetes mellitus (H)               Past Surgical History:     Past Surgical History:   Procedure Laterality Date    ANGIOGRAM      Patient reports coronary angiogram at [place]  [place], [place] approx 10 years ago    CV CORONARY ANGIOGRAM N/A 11/29/2022    Procedure: Coronary Angiogram;  Surgeon: Edwin Meadows MD;  Location:  HEART CARDIAC CATH LAB    CV TRANSCATHETER AORTIC VALVE REPLACEMENT-FEMORAL APPROACH N/A 2/7/2023    Procedure: Transcatheter Aortic Valve Replacement-Femoral Approach;  Surgeon: Juan Jang MD;  Location:  HEART CARDIAC CATH LAB    EP PACEMAKER DEVICE & LEAD IMPLANT- RIGHT ATRIAL & RIGHT VENTRICULAR N/A 2/13/2023    Procedure: Pacemaker Device & Lead Implant - Right Atrial & Right Ventricular;  Surgeon: Felix Pabon MD;  Location:  HEART CARDIAC CATH LAB              Allergies:   Patient has no known allergies.       Data:   All laboratory data reviewed:    Recent Labs   Lab Test 02/11/23  0818 01/31/23  1038 01/04/23  1014 10/04/22  1000 04/17/19  0000 09/04/18  0000   LDL  --   --   --   --  37 40   HDL  --   --   --   --  39 41   NHDL  --   --   --   --  57 57   CHOL  --   --   --   --  96 98   TRIG  --   --   --  132 112 86   TSH 1.39  --   --   --   --   --    NTBNP  --  145 81  --   --   --        Lab Results   Component Value Date    WBC 6.3 03/25/2024    WBC 6.8 10/03/2019    RBC 5.16 03/25/2024    RBC 4.94 10/03/2019    HGB 15.5 03/25/2024    HGB 15.1 10/03/2019    HCT 46.3 03/25/2024    HCT 45.1 10/03/2019    MCV 90 03/25/2024    MCV 91 10/03/2019    MCH 30.0 03/25/2024    MCH 30.6 10/03/2019    MCHC 33.5 03/25/2024    MCHC 33.5 10/03/2019    RDW 13.2 03/25/2024    RDW 12.8 10/03/2019     (L) 03/25/2024     10/03/2019       Lab Results   Component Value Date     03/25/2024     04/17/2019    POTASSIUM 4.8 03/25/2024    POTASSIUM 4.4 01/04/2023    POTASSIUM 4.4 04/17/2019    CHLORIDE 104 03/25/2024    CHLORIDE 106 01/04/2023    CHLORIDE 102 10/04/2022    CHLORIDE 104 04/17/2019    CO2 23 03/25/2024    CO2 25 01/04/2023    CO2 28 04/17/2019    ANIONGAP 14 03/25/2024    ANIONGAP 8 01/04/2023    ANIONGAP 11  10/20/2009     (H) 03/25/2024     (H) 02/14/2023     (H) 01/04/2023    GLC 88 04/17/2019    BUN 16.9 03/25/2024    BUN 18 01/04/2023    BUN 18 04/17/2019    CR 0.79 03/25/2024    CR 0.87 04/17/2019    GFRESTIMATED 89 03/25/2024    GFRESTIMATED 83 04/17/2019    GFRESTBLACK 96 04/17/2019    GIULIANO 9.3 03/25/2024    GIULIANO 9.2 04/17/2019      Lab Results   Component Value Date    AST 33 03/25/2024    AST 23 04/17/2019    ALT 35 03/25/2024    ALT 23 04/17/2019       Lab Results   Component Value Date    A1C 6.4 (H) 02/11/2023       Lab Results   Component Value Date    INR 0.98 01/31/2023    INR 1.08 01/04/2023       KCCQ-12  5  5  5  5  3  7  5  5  4  5  4  5

## 2024-04-30 ENCOUNTER — TELEPHONE (OUTPATIENT)
Dept: CARDIOLOGY | Facility: CLINIC | Age: 84
End: 2024-04-30
Payer: MEDICARE

## 2024-04-30 NOTE — TELEPHONE ENCOUNTER
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  [facility]

## 2024-04-30 NOTE — TELEPHONE ENCOUNTER
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     Called [facility] and reviewed this with them as well. Verified fax number of: [phone]. Informed them we will work to get this over to them in the next 1-2 days.

## 2024-05-22 ENCOUNTER — ANCILLARY PROCEDURE (OUTPATIENT)
Dept: CARDIOLOGY | Facility: CLINIC | Age: 84
End: 2024-05-22
Attending: INTERNAL MEDICINE
Payer: MEDICARE

## 2024-05-22 DIAGNOSIS — Z95.0 CARDIAC PACEMAKER IN SITU: ICD-10-CM

## 2024-05-22 PROCEDURE — 93280 PM DEVICE PROGR EVAL DUAL: CPT | Performed by: INTERNAL MEDICINE

## 2024-05-23 LAB
MDC_IDC_EPISODE_DTM: NORMAL
MDC_IDC_EPISODE_DURATION: 1082 S
MDC_IDC_EPISODE_DURATION: 1470 S
MDC_IDC_EPISODE_DURATION: 2470 S
MDC_IDC_EPISODE_DURATION: 26 S
MDC_IDC_EPISODE_DURATION: 322 S
MDC_IDC_EPISODE_DURATION: 3416 S
MDC_IDC_EPISODE_DURATION: 3930 S
MDC_IDC_EPISODE_DURATION: 4290 S
MDC_IDC_EPISODE_DURATION: 4306 S
MDC_IDC_EPISODE_DURATION: 6084 S
MDC_IDC_EPISODE_DURATION: 76 S
MDC_IDC_EPISODE_DURATION: NORMAL S
MDC_IDC_EPISODE_ID: 10
MDC_IDC_EPISODE_ID: 11
MDC_IDC_EPISODE_ID: 12
MDC_IDC_EPISODE_ID: 13
MDC_IDC_EPISODE_ID: 14
MDC_IDC_EPISODE_ID: 15
MDC_IDC_EPISODE_ID: 16
MDC_IDC_EPISODE_ID: 17
MDC_IDC_EPISODE_ID: 18
MDC_IDC_EPISODE_ID: 19
MDC_IDC_EPISODE_ID: 19
MDC_IDC_EPISODE_ID: 20
MDC_IDC_EPISODE_ID: 3
MDC_IDC_EPISODE_ID: 4
MDC_IDC_EPISODE_ID: 5
MDC_IDC_EPISODE_ID: 6
MDC_IDC_EPISODE_ID: 7
MDC_IDC_EPISODE_ID: 8
MDC_IDC_EPISODE_ID: 9
MDC_IDC_EPISODE_TYPE: NORMAL
MDC_IDC_EPISODE_TYPE_INDUCED: NO
MDC_IDC_EPISODE_VENDOR_TYPE: NORMAL
MDC_IDC_LEAD_CONNECTION_STATUS: NORMAL
MDC_IDC_LEAD_CONNECTION_STATUS: NORMAL
MDC_IDC_LEAD_IMPLANT_DT: NORMAL
MDC_IDC_LEAD_IMPLANT_DT: NORMAL
MDC_IDC_LEAD_LOCATION: NORMAL
MDC_IDC_LEAD_LOCATION: NORMAL
MDC_IDC_LEAD_LOCATION_DETAIL_1: NORMAL
MDC_IDC_LEAD_LOCATION_DETAIL_1: NORMAL
MDC_IDC_LEAD_MFG: NORMAL
MDC_IDC_LEAD_MFG: NORMAL
MDC_IDC_LEAD_MODEL: NORMAL
MDC_IDC_LEAD_MODEL: NORMAL
MDC_IDC_LEAD_POLARITY_TYPE: NORMAL
MDC_IDC_LEAD_POLARITY_TYPE: NORMAL
MDC_IDC_LEAD_SERIAL: NORMAL
MDC_IDC_LEAD_SERIAL: NORMAL
MDC_IDC_MSMT_BATTERY_DTM: NORMAL
MDC_IDC_MSMT_BATTERY_REMAINING_PERCENTAGE: 85 %
MDC_IDC_MSMT_BATTERY_STATUS: NORMAL
MDC_IDC_MSMT_LEADCHNL_RA_IMPEDANCE_VALUE: 624 OHM
MDC_IDC_MSMT_LEADCHNL_RA_LEAD_CHANNEL_STATUS: NORMAL
MDC_IDC_MSMT_LEADCHNL_RA_PACING_THRESHOLD_AMPLITUDE: 0.5 V
MDC_IDC_MSMT_LEADCHNL_RA_PACING_THRESHOLD_PULSEWIDTH: 0.4 MS
MDC_IDC_MSMT_LEADCHNL_RV_IMPEDANCE_VALUE: 604 OHM
MDC_IDC_MSMT_LEADCHNL_RV_LEAD_CHANNEL_STATUS: NORMAL
MDC_IDC_MSMT_LEADCHNL_RV_PACING_THRESHOLD_AMPLITUDE: 0.7 V
MDC_IDC_MSMT_LEADCHNL_RV_PACING_THRESHOLD_PULSEWIDTH: 0.4 MS
MDC_IDC_PG_IMPLANT_DTM: NORMAL
MDC_IDC_PG_MFG: NORMAL
MDC_IDC_PG_MODEL: NORMAL
MDC_IDC_PG_SERIAL: NORMAL
MDC_IDC_PG_TYPE: NORMAL
MDC_IDC_SESS_CLINIC_NAME: NORMAL
MDC_IDC_SESS_DTM: NORMAL
MDC_IDC_SESS_TYPE: NORMAL
MDC_IDC_SET_BRADY_AT_MODE_SWITCH_MODE: NORMAL
MDC_IDC_SET_BRADY_AT_MODE_SWITCH_RATE: 180 {BEATS}/MIN
MDC_IDC_SET_BRADY_LOWRATE: 60 {BEATS}/MIN
MDC_IDC_SET_BRADY_MAX_SENSOR_RATE: 120 {BEATS}/MIN
MDC_IDC_SET_BRADY_MAX_TRACKING_RATE: 130 {BEATS}/MIN
MDC_IDC_SET_BRADY_MODE: NORMAL
MDC_IDC_SET_BRADY_PAV_DELAY_HIGH: 160 MS
MDC_IDC_SET_BRADY_PAV_DELAY_LOW: 200 MS
MDC_IDC_SET_BRADY_SAV_DELAY_HIGH: 130 MS
MDC_IDC_SET_BRADY_SAV_DELAY_LOW: 170 MS
MDC_IDC_SET_LEADCHNL_RA_PACING_AMPLITUDE: 2 V
MDC_IDC_SET_LEADCHNL_RA_PACING_CAPTURE_MODE: NORMAL
MDC_IDC_SET_LEADCHNL_RA_PACING_POLARITY: NORMAL
MDC_IDC_SET_LEADCHNL_RA_PACING_PULSEWIDTH: 0.4 MS
MDC_IDC_SET_LEADCHNL_RA_SENSING_ADAPTATION_MODE: NORMAL
MDC_IDC_SET_LEADCHNL_RA_SENSING_POLARITY: NORMAL
MDC_IDC_SET_LEADCHNL_RV_PACING_AMPLITUDE: 1.2 V
MDC_IDC_SET_LEADCHNL_RV_PACING_CAPTURE_MODE: NORMAL
MDC_IDC_SET_LEADCHNL_RV_PACING_POLARITY: NORMAL
MDC_IDC_SET_LEADCHNL_RV_PACING_PULSEWIDTH: 0.4 MS
MDC_IDC_SET_LEADCHNL_RV_SENSING_ADAPTATION_MODE: NORMAL
MDC_IDC_SET_LEADCHNL_RV_SENSING_POLARITY: NORMAL
MDC_IDC_STAT_AT_BURDEN_PERCENT: 7 %
MDC_IDC_STAT_AT_DTM_END: NORMAL
MDC_IDC_STAT_AT_DTM_START: NORMAL
MDC_IDC_STAT_AT_MODE_SW_COUNT: NORMAL
MDC_IDC_STAT_BRADY_DTM_END: NORMAL
MDC_IDC_STAT_BRADY_DTM_START: NORMAL
MDC_IDC_STAT_BRADY_RA_PERCENT_PACED: 38 %
MDC_IDC_STAT_BRADY_RV_PERCENT_PACED: 98 %
MDC_IDC_STAT_EPISODE_RECENT_COUNT: 126
MDC_IDC_STAT_EPISODE_RECENT_COUNT: 3
MDC_IDC_STAT_EPISODE_RECENT_COUNT_DTM_END: NORMAL
MDC_IDC_STAT_EPISODE_RECENT_COUNT_DTM_END: NORMAL
MDC_IDC_STAT_EPISODE_RECENT_COUNT_DTM_START: NORMAL
MDC_IDC_STAT_EPISODE_RECENT_COUNT_DTM_START: NORMAL
MDC_IDC_STAT_EPISODE_TYPE: NORMAL
MDC_IDC_STAT_EPISODE_TYPE: NORMAL
MDC_IDC_STAT_EPISODE_VENDOR_TYPE: NORMAL

## 2024-05-30 ENCOUNTER — TELEPHONE (OUTPATIENT)
Dept: CARDIOLOGY | Facility: CLINIC | Age: 84
End: 2024-05-30
Payer: MEDICARE

## 2024-05-30 NOTE — TELEPHONE ENCOUNTER
"Pt left VM. He said his heart felt out of rhythm last night, and he wonders if we can check on his pacemaker if anything happened.     Pt has [employer] PPM. Remote monitor updated this morning (5/30/2024, 2:29am). There were no alerts, no episodes listed, and trend graph shows no atrial arrhythmias, no mode switches, and no high ventricular rate episodes yesterday.     Called pt back. Told him nothing abnormal was recorded. Pt said he felt his heart \"go crazy\" last night and it woke him up around 9:00pm. I reassured pt that nothing was abnormal. Pt states understanding and thanked me for the call.                     "

## 2024-08-02 ENCOUNTER — TELEPHONE (OUTPATIENT)
Dept: CARDIOLOGY | Facility: CLINIC | Age: 84
End: 2024-08-02
Payer: MEDICARE

## 2024-08-02 NOTE — TELEPHONE ENCOUNTER
Message from chart prep team: is echo in August needed as this was already done in March?    Per Dr. Villatoro's reply: echo not needed in August.    Message sent to scheduling to cancel echo on 8/21/2024.

## 2024-08-23 ENCOUNTER — OFFICE VISIT (OUTPATIENT)
Dept: CARDIOLOGY | Facility: CLINIC | Age: 84
End: 2024-08-23
Attending: INTERNAL MEDICINE
Payer: MEDICARE

## 2024-08-23 VITALS
WEIGHT: 197.7 LBS | OXYGEN SATURATION: 97 % | HEIGHT: 68 IN | BODY MASS INDEX: 29.96 KG/M2 | HEART RATE: 72 BPM | DIASTOLIC BLOOD PRESSURE: 75 MMHG | SYSTOLIC BLOOD PRESSURE: 131 MMHG

## 2024-08-23 DIAGNOSIS — E78.5 HYPERLIPIDEMIA LDL GOAL <70: ICD-10-CM

## 2024-08-23 DIAGNOSIS — Z95.3 S/P TAVR (TRANSCATHETER AORTIC VALVE REPLACEMENT), BIOPROSTHETIC: Primary | ICD-10-CM

## 2024-08-23 DIAGNOSIS — I10 BENIGN ESSENTIAL HYPERTENSION: ICD-10-CM

## 2024-08-23 DIAGNOSIS — I48.0 PAROXYSMAL ATRIAL FIBRILLATION (H): ICD-10-CM

## 2024-08-23 DIAGNOSIS — Z95.0 CARDIAC PACEMAKER IN SITU: ICD-10-CM

## 2024-08-23 PROCEDURE — 99215 OFFICE O/P EST HI 40 MIN: CPT | Performed by: INTERNAL MEDICINE

## 2024-08-23 RX ORDER — LISINOPRIL 10 MG/1
10 TABLET ORAL 2 TIMES DAILY
Qty: 200 TABLET | Refills: 6 | Status: SHIPPED | OUTPATIENT
Start: 2024-08-23 | End: 2024-08-23

## 2024-08-23 RX ORDER — LISINOPRIL 10 MG/1
10 TABLET ORAL EVERY EVENING
Qty: 100 TABLET | Refills: 6 | Status: SHIPPED | OUTPATIENT
Start: 2024-08-23

## 2024-08-23 RX ORDER — METOPROLOL SUCCINATE 25 MG/1
25 TABLET, EXTENDED RELEASE ORAL EVERY MORNING
Qty: 100 TABLET | Refills: 6 | Status: SHIPPED | OUTPATIENT
Start: 2024-08-23

## 2024-08-23 RX ORDER — ROSUVASTATIN CALCIUM 10 MG/1
10 TABLET, COATED ORAL DAILY
Qty: 100 TABLET | Refills: 6 | Status: SHIPPED | OUTPATIENT
Start: 2024-08-23

## 2024-08-23 NOTE — PATIENT INSTRUCTIONS
1.  Medications:  -- Start a new medication called metoprolol XL 25 mg.  Take 1 tablet in the morning around 9 AM.  -- Decrease lisinopril medication from 10 mg 2 times daily to 10 mg once daily.  Take it around 6 PM in the evening.  -- No changes to other medications.  -- If you experience any side effects, send Dr. Villatoro a Elimi message.    2.  Testing and follow up:  -- Follow-up with Dr. Villatoro in 1 year with an echocardiogram and fasting blood tests.

## 2024-08-23 NOTE — PROGRESS NOTES
SERVICE DATE: August 23, 2024    PRIMARY CARDIOLOGY TEAM:     DIAGNOSES/ASSESSMENT:  Status post transcatheter aortic valve replacement in 2023.  Well-functioning valve, no PVL, normal LVEF.  Asymptomatic nonobstructive coronary artery disease on angiogram in November 2022.  Dual-chamber pacemaker in situ.  Well-functioning.  98% V paced.  Paroxysmal atrial fibrillation with 12% burden on device interrogation.  Add low-dose beta-blocker.  Continue long-term Eliquis.  Mixed dyslipidemia with LDL at goal at 59.  Continue rosuvastatin 20 mg daily.  Encouraged dietary modification and regular exercise.  Well-controlled systemic hypertension.    PLAN:  Start metoprolol XL 25 mg daily for PAF.  Cut down lisinopril from 10 mg twice daily to once daily to allow blood pressure room for beta-blocker.  Take lisinopril in the evening.  Follow-up with me in 1 year with echocardiogram and labs.      Kulwinder Villatoro MD  Cardiology      REASON FOR VISIT:  Follow-up of TAVR, pacemaker with PAF, hypertension, hyperlipidemia.    HISTORY OF PRESENT ILLNESS:  Reynaldo Monteiro is a delightful 83-year-old male who is status post recent transcatheter bioprosthetic aortic valve replacement with a 29 mm Villanueva ultra valve on 2/7/2023, complicated by postoperative high-grade AV block for which a dual-chamber pacemaker was implanted on 2/13/2023, mild coronary artery disease on angiogram dated 7/29/2022, type 2 diabetes mellitus, hypertension, hyperlipidemia, paroxysmal atrial fibrillation (on Eliquis anticoagulation).  BMI 30, never tobacco user.    Cardiovascularly asymptomatic.  Physically active.  Main symptom is low back pain that is being addressed by his primary care team.  /75, pulse 72 bpm, BMI 30.  Regular heart sounds, pacemaker site satisfactory, no audible murmur, lungs are clear.  No lower extremity edema.    Labs reviewed.  LDL 59, rest of lipid panel shows metabolic syndrome with low HDL of 34, mild  "hypertriglyceridemia of 169, normal liver enzymes, hemoglobin 15.5, HbA1c 6.4%, normal TSH, normal renal panel with a creatinine of 0.79, potassium 4.8.    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    Recent echocardiogram shows well-functioning bioprosthetic aortic valve with a mean gradient of 9 mmHg, no regurgitation, normal LVEF of 65%, normal RV function, pacemaker lead in the right ventricle with trace tricuspid valve regurgitation, normal inferior vena cava.    Pacemaker interrogation showed underlying paroxysmal atrial fibrillation with A-fib burden of 12%, 98% ventricularly paced.  No ventricular arrhythmias.      Established patient.   40 minutes spent by me on the date of the encounter doing chart review, history and exam, documentation and further activities per the note    The longitudinal plan of care for the condition(s) below were addressed during this visit. Due to the added complexity in care, I will continue to support Reynaldo in the subsequent management of this condition(s) and with the ongoing continuity of care of this condition(s).      This note was completed in part using dictation via the Dragon voice recognition software. Some word and grammatical errors may occur and must be interpreted in the appropriate clinical context. If there are any questions pertaining to this issue, please contact me for further clarification.     Orders Placed This Encounter   Procedures    CBC with platelets    Lipid Profile    Basic metabolic panel    Follow-Up with Cardiology    Echocardiogram Complete         Vitals: /75   Pulse 72   Ht 1.727 m (5' 8\")   Wt 89.7 kg (197 lb 11.2 oz)   SpO2 97%   BMI 30.06 kg/m        CURRENT MEDICATIONS:  Current Outpatient Medications   Medication Sig Dispense Refill    amoxicillin (AMOXIL) 500 MG capsule Take 4 capsules (2,000 mg) by mouth as needed (30-60 minutes prior to dental procedures) 4 capsule 11    apixaban ANTICOAGULANT (ELIQUIS " ANTICOAGULANT) 5 MG tablet Take 1 tablet (5 mg) by mouth 2 times daily. 200 tablet 6    doxazosin (CARDURA) 4 MG tablet Take 2 mg by mouth At Bedtime (4MG X 0.5 = 2MG)      empagliflozin (JARDIANCE) 10 MG TABS tablet Take 10 mg by mouth daily      glucosamine 500 MG CAPS capsule Take 500 mg by mouth daily      lisinopril (ZESTRIL) 10 MG tablet Take 1 tablet (10 mg) by mouth every evening. 100 tablet 6    metoprolol succinate ER (TOPROL XL) 25 MG 24 hr tablet Take 1 tablet (25 mg) by mouth every morning. 100 tablet 6    Multiple Vitamins-Minerals (ZINC PO) Take 1 tablet by mouth daily Patient unsure of dose      rosuvastatin (CRESTOR) 10 MG tablet Take 1 tablet (10 mg) by mouth daily. 100 tablet 6    tadalafil (CIALIS) 20 MG tablet Take 20 mg by mouth daily as needed      vitamin C with B complex (B COMPLEX-C) tablet Take 1 tablet by mouth daily      vitamin D3 (CHOLECALCIFEROL) 50 mcg (2000 units) tablet Take 1 tablet by mouth daily      vitamin E 400 UNIT capsule Take 400 Units by mouth daily      polyethylene glycol (MIRALAX) 17 GM/Dose powder Take 1 capful by mouth daily as needed for constipation (Patient not taking: Reported on 8/23/2024)           ALLERGIES:  No Known Allergies

## 2024-08-23 NOTE — LETTER
8/23/2024    MICHELLE WATKINS  [facility] [address]  [place] [facility] [address]    RE: Reynaldo Monteiro       Dear Colleague,     I had the pleasure of seeing Reynaldo Monteiro in the [facility].      SERVICE DATE: August 23, 2024    PRIMARY CARDIOLOGY TEAM:     DIAGNOSES/ASSESSMENT:  Status post transcatheter aortic valve replacement in 2023.  Well-functioning valve, no PVL, normal LVEF.  Asymptomatic nonobstructive coronary artery disease on angiogram in November 2022.  Dual-chamber pacemaker in situ.  Well-functioning.  98% V paced.  Paroxysmal atrial fibrillation with 12% burden on device interrogation.  Add low-dose beta-blocker.  Continue long-term Eliquis.  Mixed dyslipidemia with LDL at goal at 59.  Continue rosuvastatin 20 mg daily.  Encouraged dietary modification and regular exercise.  Well-controlled systemic hypertension.    PLAN:  Start metoprolol XL 25 mg daily for PAF.  Cut down lisinopril from 10 mg twice daily to once daily to allow blood pressure room for beta-blocker.  Take lisinopril in the evening.  Follow-up with me in 1 year with echocardiogram and labs.      Kulwinder Villatoro MD  Cardiology      REASON FOR VISIT:  Follow-up of TAVR, pacemaker with PAF, hypertension, hyperlipidemia.    HISTORY OF PRESENT ILLNESS:  Reynaldo Monteiro is a delightful 83-year-old male who is status post recent transcatheter bioprosthetic aortic valve replacement with a 29 mm Villanueva ultra valve on 2/7/2023, complicated by postoperative high-grade AV block for which a dual-chamber pacemaker was implanted on 2/13/2023, mild coronary artery disease on angiogram dated 7/29/2022, type 2 diabetes mellitus, hypertension, hyperlipidemia, paroxysmal atrial fibrillation (on Eliquis anticoagulation).  BMI 30, never tobacco user.    Cardiovascularly asymptomatic.  Physically active.  Main symptom is low back pain that is being addressed by his primary care team.  /75, pulse 72 bpm, BMI 30.   "Regular heart sounds, pacemaker site satisfactory, no audible murmur, lungs are clear.  No lower extremity edema.    Labs reviewed.  LDL 59, rest of lipid panel shows metabolic syndrome with low HDL of 34, mild hypertriglyceridemia of 169, normal liver enzymes, hemoglobin 15.5, HbA1c 6.4%, normal TSH, normal renal panel with a creatinine of 0.79, potassium 4.8.    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    Recent echocardiogram shows well-functioning bioprosthetic aortic valve with a mean gradient of 9 mmHg, no regurgitation, normal LVEF of 65%, normal RV function, pacemaker lead in the right ventricle with trace tricuspid valve regurgitation, normal inferior vena cava.    Pacemaker interrogation showed underlying paroxysmal atrial fibrillation with A-fib burden of 12%, 98% ventricularly paced.  No ventricular arrhythmias.      Established patient.   40 minutes spent by me on the date of the encounter doing chart review, history and exam, documentation and further activities per the note    The longitudinal plan of care for the condition(s) below were addressed during this visit. Due to the added complexity in care, I will continue to support Reynaldo in the subsequent management of this condition(s) and with the ongoing continuity of care of this condition(s).      This note was completed in part using dictation via the Dragon voice recognition software. Some word and grammatical errors may occur and must be interpreted in the appropriate clinical context. If there are any questions pertaining to this issue, please contact me for further clarification.     Orders Placed This Encounter   Procedures     CBC with platelets     Lipid Profile     Basic metabolic panel     Follow-Up with Cardiology     Echocardiogram Complete         Vitals: /75   Pulse 72   Ht 1.727 m (5' 8\")   Wt 89.7 kg (197 lb 11.2 oz)   SpO2 97%   BMI 30.06 kg/m        CURRENT MEDICATIONS:  Current Outpatient Medications "   Medication Sig Dispense Refill     amoxicillin (AMOXIL) 500 MG capsule Take 4 capsules (2,000 mg) by mouth as needed (30-60 minutes prior to dental procedures) 4 capsule 11     apixaban ANTICOAGULANT (ELIQUIS ANTICOAGULANT) 5 MG tablet Take 1 tablet (5 mg) by mouth 2 times daily. 200 tablet 6     doxazosin (CARDURA) 4 MG tablet Take 2 mg by mouth At Bedtime (4MG X 0.5 = 2MG)       empagliflozin (JARDIANCE) 10 MG TABS tablet Take 10 mg by mouth daily       glucosamine 500 MG CAPS capsule Take 500 mg by mouth daily       lisinopril (ZESTRIL) 10 MG tablet Take 1 tablet (10 mg) by mouth every evening. 100 tablet 6     metoprolol succinate ER (TOPROL XL) 25 MG 24 hr tablet Take 1 tablet (25 mg) by mouth every morning. 100 tablet 6     Multiple Vitamins-Minerals (ZINC PO) Take 1 tablet by mouth daily Patient unsure of dose       rosuvastatin (CRESTOR) 10 MG tablet Take 1 tablet (10 mg) by mouth daily. 100 tablet 6     tadalafil (CIALIS) 20 MG tablet Take 20 mg by mouth daily as needed       vitamin C with B complex (B COMPLEX-C) tablet Take 1 tablet by mouth daily       vitamin D3 (CHOLECALCIFEROL) 50 mcg (2000 units) tablet Take 1 tablet by mouth daily       vitamin E 400 UNIT capsule Take 400 Units by mouth daily       polyethylene glycol (MIRALAX) 17 GM/Dose powder Take 1 capful by mouth daily as needed for constipation (Patient not taking: Reported on 8/23/2024)           ALLERGIES:  No Known Allergies            Thank you for allowing me to participate in the care of your patient.      Sincerely,     Kulwinder Villatoro MD     [facility]  cc:   Kulwinder Villatoro MD  [address] [place] [id number]

## 2024-09-04 ENCOUNTER — ANCILLARY PROCEDURE (OUTPATIENT)
Dept: CARDIOLOGY | Facility: CLINIC | Age: 84
End: 2024-09-04
Attending: INTERNAL MEDICINE
Payer: MEDICARE

## 2024-09-04 DIAGNOSIS — Z95.0 CARDIAC PACEMAKER IN SITU: ICD-10-CM

## 2024-09-04 LAB
MDC_IDC_EPISODE_DTM: NORMAL
MDC_IDC_EPISODE_DURATION: 4404 S
MDC_IDC_EPISODE_ID: 21
MDC_IDC_EPISODE_ID: 22
MDC_IDC_EPISODE_ID: 23
MDC_IDC_EPISODE_ID: 24
MDC_IDC_EPISODE_ID: 25
MDC_IDC_EPISODE_ID: 26
MDC_IDC_EPISODE_TYPE: NORMAL
MDC_IDC_EPISODE_TYPE_INDUCED: NO
MDC_IDC_EPISODE_VENDOR_TYPE: NORMAL
MDC_IDC_LEAD_CONNECTION_STATUS: NORMAL
MDC_IDC_LEAD_CONNECTION_STATUS: NORMAL
MDC_IDC_LEAD_IMPLANT_DT: NORMAL
MDC_IDC_LEAD_IMPLANT_DT: NORMAL
MDC_IDC_LEAD_LOCATION: NORMAL
MDC_IDC_LEAD_LOCATION: NORMAL
MDC_IDC_LEAD_LOCATION_DETAIL_1: NORMAL
MDC_IDC_LEAD_LOCATION_DETAIL_1: NORMAL
MDC_IDC_LEAD_MFG: NORMAL
MDC_IDC_LEAD_MFG: NORMAL
MDC_IDC_LEAD_MODEL: NORMAL
MDC_IDC_LEAD_MODEL: NORMAL
MDC_IDC_LEAD_POLARITY_TYPE: NORMAL
MDC_IDC_LEAD_POLARITY_TYPE: NORMAL
MDC_IDC_LEAD_SERIAL: NORMAL
MDC_IDC_LEAD_SERIAL: NORMAL
MDC_IDC_MSMT_BATTERY_DTM: NORMAL
MDC_IDC_MSMT_BATTERY_REMAINING_PERCENTAGE: 85 %
MDC_IDC_MSMT_BATTERY_STATUS: NORMAL
MDC_IDC_MSMT_LEADCHNL_RA_IMPEDANCE_VALUE: 605 OHM
MDC_IDC_MSMT_LEADCHNL_RA_LEAD_CHANNEL_STATUS: NORMAL
MDC_IDC_MSMT_LEADCHNL_RV_IMPEDANCE_VALUE: 527 OHM
MDC_IDC_MSMT_LEADCHNL_RV_LEAD_CHANNEL_STATUS: NORMAL
MDC_IDC_MSMT_LEADCHNL_RV_PACING_THRESHOLD_AMPLITUDE: 0.8 V
MDC_IDC_MSMT_LEADCHNL_RV_PACING_THRESHOLD_PULSEWIDTH: 0.4 MS
MDC_IDC_PG_IMPLANT_DTM: NORMAL
MDC_IDC_PG_MFG: NORMAL
MDC_IDC_PG_MODEL: NORMAL
MDC_IDC_PG_SERIAL: NORMAL
MDC_IDC_PG_TYPE: NORMAL
MDC_IDC_SESS_CLINIC_NAME: NORMAL
MDC_IDC_SESS_DTM: NORMAL
MDC_IDC_SESS_REPROGRAMMED: NO
MDC_IDC_SESS_TYPE: NORMAL
MDC_IDC_SET_BRADY_AT_MODE_SWITCH_MODE: NORMAL
MDC_IDC_SET_BRADY_AT_MODE_SWITCH_RATE: 180 {BEATS}/MIN
MDC_IDC_SET_BRADY_LOWRATE: 60 {BEATS}/MIN
MDC_IDC_SET_BRADY_MAX_SENSOR_RATE: 120 {BEATS}/MIN
MDC_IDC_SET_BRADY_MAX_TRACKING_RATE: 130 {BEATS}/MIN
MDC_IDC_SET_BRADY_MODE: NORMAL
MDC_IDC_SET_BRADY_PAV_DELAY_HIGH: 160 MS
MDC_IDC_SET_BRADY_PAV_DELAY_LOW: 200 MS
MDC_IDC_SET_BRADY_SAV_DELAY_HIGH: 130 MS
MDC_IDC_SET_BRADY_SAV_DELAY_LOW: 170 MS
MDC_IDC_SET_LEADCHNL_RA_PACING_AMPLITUDE: 2 V
MDC_IDC_SET_LEADCHNL_RA_PACING_ANODE_ELECTRODE_1: NORMAL
MDC_IDC_SET_LEADCHNL_RA_PACING_ANODE_LOCATION_1: NORMAL
MDC_IDC_SET_LEADCHNL_RA_PACING_CAPTURE_MODE: NORMAL
MDC_IDC_SET_LEADCHNL_RA_PACING_CATHODE_ELECTRODE_1: NORMAL
MDC_IDC_SET_LEADCHNL_RA_PACING_CATHODE_LOCATION_1: NORMAL
MDC_IDC_SET_LEADCHNL_RA_PACING_POLARITY: NORMAL
MDC_IDC_SET_LEADCHNL_RA_PACING_PULSEWIDTH: 0.4 MS
MDC_IDC_SET_LEADCHNL_RA_SENSING_ADAPTATION_MODE: NORMAL
MDC_IDC_SET_LEADCHNL_RA_SENSING_ANODE_ELECTRODE_1: NORMAL
MDC_IDC_SET_LEADCHNL_RA_SENSING_ANODE_LOCATION_1: NORMAL
MDC_IDC_SET_LEADCHNL_RA_SENSING_CATHODE_ELECTRODE_1: NORMAL
MDC_IDC_SET_LEADCHNL_RA_SENSING_CATHODE_LOCATION_1: NORMAL
MDC_IDC_SET_LEADCHNL_RA_SENSING_POLARITY: NORMAL
MDC_IDC_SET_LEADCHNL_RV_PACING_AMPLITUDE: 1.3 V
MDC_IDC_SET_LEADCHNL_RV_PACING_ANODE_ELECTRODE_1: NORMAL
MDC_IDC_SET_LEADCHNL_RV_PACING_ANODE_LOCATION_1: NORMAL
MDC_IDC_SET_LEADCHNL_RV_PACING_CAPTURE_MODE: NORMAL
MDC_IDC_SET_LEADCHNL_RV_PACING_CATHODE_ELECTRODE_1: NORMAL
MDC_IDC_SET_LEADCHNL_RV_PACING_CATHODE_LOCATION_1: NORMAL
MDC_IDC_SET_LEADCHNL_RV_PACING_POLARITY: NORMAL
MDC_IDC_SET_LEADCHNL_RV_PACING_PULSEWIDTH: 0.4 MS
MDC_IDC_SET_LEADCHNL_RV_SENSING_ADAPTATION_MODE: NORMAL
MDC_IDC_SET_LEADCHNL_RV_SENSING_ANODE_ELECTRODE_1: NORMAL
MDC_IDC_SET_LEADCHNL_RV_SENSING_ANODE_LOCATION_1: NORMAL
MDC_IDC_SET_LEADCHNL_RV_SENSING_CATHODE_ELECTRODE_1: NORMAL
MDC_IDC_SET_LEADCHNL_RV_SENSING_CATHODE_LOCATION_1: NORMAL
MDC_IDC_SET_LEADCHNL_RV_SENSING_POLARITY: NORMAL
MDC_IDC_STAT_AT_BURDEN_PERCENT: 11 %
MDC_IDC_STAT_AT_DTM_END: NORMAL
MDC_IDC_STAT_AT_DTM_START: NORMAL
MDC_IDC_STAT_AT_MODE_SW_COUNT_PER_DAY: 39
MDC_IDC_STAT_AT_MODE_SW_PERCENT_TIME_PER_DAY: 11 %
MDC_IDC_STAT_BRADY_AP_VP_PERCENT: 61 %
MDC_IDC_STAT_BRADY_AP_VS_PERCENT: 0 %
MDC_IDC_STAT_BRADY_AS_VP_PERCENT: 37 %
MDC_IDC_STAT_BRADY_AS_VS_PERCENT: 0 %
MDC_IDC_STAT_BRADY_DTM_END: NORMAL
MDC_IDC_STAT_BRADY_DTM_START: NORMAL
MDC_IDC_STAT_BRADY_RA_PERCENT_PACED: 55 %
MDC_IDC_STAT_BRADY_RV_PERCENT_PACED: 98 %
MDC_IDC_STAT_HEART_RATE_ATRIAL_MEAN: 99 {BEATS}/MIN
MDC_IDC_STAT_HEART_RATE_DTM_END: NORMAL
MDC_IDC_STAT_HEART_RATE_DTM_START: NORMAL
MDC_IDC_STAT_HEART_RATE_VENTRICULAR_MEAN: 74 {BEATS}/MIN

## 2024-09-04 PROCEDURE — 93294 REM INTERROG EVL PM/LDLS PM: CPT | Performed by: INTERNAL MEDICINE

## 2024-09-04 PROCEDURE — 93296 REM INTERROG EVL PM/IDS: CPT | Performed by: INTERNAL MEDICINE

## 2024-10-26 ENCOUNTER — HOSPITAL ENCOUNTER (EMERGENCY)
Facility: CLINIC | Age: 84
Discharge: HOME | End: 2024-10-27
Attending: EMERGENCY MEDICINE | Admitting: EMERGENCY MEDICINE
Payer: MEDICARE

## 2024-10-26 DIAGNOSIS — R55 NEAR SYNCOPE: ICD-10-CM

## 2024-10-26 DIAGNOSIS — Z95.0 HISTORY OF CARDIAC PACEMAKER: ICD-10-CM

## 2024-10-26 DIAGNOSIS — Z86.79 HISTORY OF ATRIAL FIBRILLATION: ICD-10-CM

## 2024-10-26 LAB
ALBUMIN SERPL BCG-MCNC: 4.4 G/DL (ref 3.5–5.2)
ALP SERPL-CCNC: 100 U/L (ref 40–150)
ALT SERPL W P-5'-P-CCNC: 36 U/L (ref 0–70)
ANION GAP SERPL CALCULATED.3IONS-SCNC: 18 MMOL/L (ref 7–15)
AST SERPL W P-5'-P-CCNC: 29 U/L (ref 0–45)
BASOPHILS # BLD AUTO: 0 10E3/UL (ref 0–0.2)
BASOPHILS NFR BLD AUTO: 0 %
BILIRUB SERPL-MCNC: 0.3 MG/DL
BUN SERPL-MCNC: 17.3 MG/DL (ref 8–23)
CALCIUM SERPL-MCNC: 8.6 MG/DL (ref 8.8–10.4)
CHLORIDE SERPL-SCNC: 100 MMOL/L (ref 98–107)
CREAT SERPL-MCNC: 0.93 MG/DL (ref 0.67–1.17)
EGFRCR SERPLBLD CKD-EPI 2021: 82 ML/MIN/1.73M2
EOSINOPHIL # BLD AUTO: 0.1 10E3/UL (ref 0–0.7)
EOSINOPHIL NFR BLD AUTO: 1 %
ERYTHROCYTE [DISTWIDTH] IN BLOOD BY AUTOMATED COUNT: 13.4 % (ref 10–15)
ETHANOL SERPL-MCNC: 0.04 G/DL
GLUCOSE BLDC GLUCOMTR-MCNC: 155 MG/DL (ref 70–99)
GLUCOSE SERPL-MCNC: 149 MG/DL (ref 70–99)
HCO3 SERPL-SCNC: 21 MMOL/L (ref 22–29)
HCT VFR BLD AUTO: 46.3 % (ref 40–53)
HGB BLD-MCNC: 15.5 G/DL (ref 13.3–17.7)
HOLD SPECIMEN: NORMAL
HOLD SPECIMEN: NORMAL
IMM GRANULOCYTES # BLD: 0.1 10E3/UL
IMM GRANULOCYTES NFR BLD: 1 %
LYMPHOCYTES # BLD AUTO: 3.1 10E3/UL (ref 0.8–5.3)
LYMPHOCYTES NFR BLD AUTO: 34 %
MAGNESIUM SERPL-MCNC: 2.3 MG/DL (ref 1.7–2.3)
MCH RBC QN AUTO: 30.6 PG (ref 26.5–33)
MCHC RBC AUTO-ENTMCNC: 33.5 G/DL (ref 31.5–36.5)
MCV RBC AUTO: 92 FL (ref 78–100)
MONOCYTES # BLD AUTO: 0.9 10E3/UL (ref 0–1.3)
MONOCYTES NFR BLD AUTO: 10 %
NEUTROPHILS # BLD AUTO: 4.8 10E3/UL (ref 1.6–8.3)
NEUTROPHILS NFR BLD AUTO: 54 %
NRBC # BLD AUTO: 0 10E3/UL
NRBC BLD AUTO-RTO: 0 /100
PLATELET # BLD AUTO: 142 10E3/UL (ref 150–450)
POTASSIUM SERPL-SCNC: 4 MMOL/L (ref 3.4–5.3)
PROT SERPL-MCNC: 7.2 G/DL (ref 6.4–8.3)
RBC # BLD AUTO: 5.06 10E6/UL (ref 4.4–5.9)
SODIUM SERPL-SCNC: 139 MMOL/L (ref 135–145)
TROPONIN T SERPL HS-MCNC: 16 NG/L
WBC # BLD AUTO: 9 10E3/UL (ref 4–11)

## 2024-10-26 PROCEDURE — 93005 ELECTROCARDIOGRAM TRACING: CPT

## 2024-10-26 PROCEDURE — 84484 ASSAY OF TROPONIN QUANT: CPT | Performed by: EMERGENCY MEDICINE

## 2024-10-26 PROCEDURE — 82962 GLUCOSE BLOOD TEST: CPT

## 2024-10-26 PROCEDURE — 83735 ASSAY OF MAGNESIUM: CPT | Performed by: EMERGENCY MEDICINE

## 2024-10-26 PROCEDURE — 82077 ASSAY SPEC XCP UR&BREATH IA: CPT | Performed by: EMERGENCY MEDICINE

## 2024-10-26 PROCEDURE — 99284 EMERGENCY DEPT VISIT MOD MDM: CPT

## 2024-10-26 PROCEDURE — 80053 COMPREHEN METABOLIC PANEL: CPT | Performed by: EMERGENCY MEDICINE

## 2024-10-26 PROCEDURE — 36415 COLL VENOUS BLD VENIPUNCTURE: CPT | Performed by: EMERGENCY MEDICINE

## 2024-10-26 PROCEDURE — 85025 COMPLETE CBC W/AUTO DIFF WBC: CPT | Performed by: EMERGENCY MEDICINE

## 2024-10-26 ASSESSMENT — COLUMBIA-SUICIDE SEVERITY RATING SCALE - C-SSRS
1. IN THE PAST MONTH, HAVE YOU WISHED YOU WERE DEAD OR WISHED YOU COULD GO TO SLEEP AND NOT WAKE UP?: NO
6. HAVE YOU EVER DONE ANYTHING, STARTED TO DO ANYTHING, OR PREPARED TO DO ANYTHING TO END YOUR LIFE?: NO
2. HAVE YOU ACTUALLY HAD ANY THOUGHTS OF KILLING YOURSELF IN THE PAST MONTH?: NO

## 2024-10-26 ASSESSMENT — ACTIVITIES OF DAILY LIVING (ADL)
ADLS_ACUITY_SCORE: 0
ADLS_ACUITY_SCORE: 0

## 2024-10-27 VITALS
BODY MASS INDEX: 29.95 KG/M2 | WEIGHT: 197 LBS | HEART RATE: 66 BPM | RESPIRATION RATE: 12 BRPM | OXYGEN SATURATION: 96 % | SYSTOLIC BLOOD PRESSURE: 123 MMHG | TEMPERATURE: 97.9 F | DIASTOLIC BLOOD PRESSURE: 74 MMHG

## 2024-10-27 LAB — TROPONIN T SERPL HS-MCNC: 15 NG/L

## 2024-10-27 RX ORDER — METOPROLOL TARTRATE 25 MG/1
12.5 TABLET, FILM COATED ORAL 2 TIMES DAILY
Qty: 7 TABLET | Refills: 0 | Status: SHIPPED | OUTPATIENT
Start: 2024-10-27 | End: 2024-11-11 | Stop reason: DRUGHIGH

## 2024-10-27 ASSESSMENT — ACTIVITIES OF DAILY LIVING (ADL): ADLS_ACUITY_SCORE: 0

## 2024-10-27 NOTE — ED TRIAGE NOTES
Pt here with c/o lightheadedness and dizziness, states he felt like he was going to pass out before he got here. Pt denies any CP, SOB, but does endorse some nausea. VSS.     Triage Assessment (Adult)       Row Name 10/26/24 2128          Triage Assessment    Airway WDL WDL        Respiratory WDL    Respiratory WDL WDL        Skin Circulation/Temperature WDL    Skin Circulation/Temperature WDL WDL        Cardiac WDL    Cardiac WDL WDL        Peripheral/Neurovascular WDL    Peripheral Neurovascular WDL WDL        Cognitive/Neuro/Behavioral WDL    Cognitive/Neuro/Behavioral WDL WDL

## 2024-10-27 NOTE — DISCHARGE INSTRUCTIONS
For now, stop your metoprolol.  Keep it at home until cardiology follow-up.   the new lower dose metoprolol at the pharmacy and take it this week.  Discuss ongoing medications with cardiology.    Return immediately if you feel this way again or if you pass out.

## 2024-10-27 NOTE — ED PROVIDER NOTES
Emergency Department Note      History of Present Illness     Chief Complaint   Dizziness      HPI   Reynaldo Monteiro is a 84 year old male with a history of pacemaker, TAVR, PAF on Eliquis presenting with a friend due to a near syncopal episode tonight.  He was at his grand-daughters house having cocktails with a friend when he started to feel lightheaded like he might faint.  He was seated at the time.  Felt nausea but did not vomit.  Friend notes that he suddenly looked very pale and head dropped but he was still conversant and stayed seated.  They helped support him walking outside where he started to feel better but not back to normal.  Notes recent changes in medications to try and keep him out of atrial fibrillation, has felt a little faint since then but this was the worse.  No recent illness symptoms, no chest or abdominal pain, no bleeding.  Had had 2-3 cocktails when this happened and last ate at around 4:30pm when he had a sandwich.  It is currently 9:40pm.  Has a pacemaker but doesn't recall the brand.    Independent Historian   Patient and friend who is with him    Review of External Notes   Cardiology notes, Biotron[employer] device, 8/23/24 start metoprolol XL 25mg daily for PAF, decrease lisinopril from 10mg BID to once a day in the evening.  Follow up in one year.    Past Medical History     Medical History and Problem List   Past Medical History:   Diagnosis Date    Aortic valve stenosis     Esophagitis, reflux     Hyperlipidemia     Hypertension     Lumbar disc disorder with myelopathy     RBBB (right bundle branch block)     S/p TAVR (transcatheter aortic valve replacement), bioprosthetic 2/7/2023    Severe aortic valve stenosis     Type 2 diabetes mellitus (H)        Medications   amoxicillin (AMOXIL) 500 MG capsule  apixaban ANTICOAGULANT (ELIQUIS ANTICOAGULANT) 5 MG tablet  doxazosin (CARDURA) 4 MG tablet  empagliflozin (JARDIANCE) 10 MG TABS tablet  glucosamine 500 MG CAPS capsule  lisinopril  (ZESTRIL) 10 MG tablet  metoprolol succinate ER (TOPROL XL) 25 MG 24 hr tablet  Multiple Vitamins-Minerals (ZINC PO)  polyethylene glycol (MIRALAX) 17 GM/Dose powder  rosuvastatin (CRESTOR) 10 MG tablet  tadalafil (CIALIS) 20 MG tablet  vitamin C with B complex (B COMPLEX-C) tablet  vitamin D3 (CHOLECALCIFEROL) 50 mcg (2000 units) tablet  vitamin E 400 UNIT capsule        Surgical History   Past Surgical History:   Procedure Laterality Date    ANGIOGRAM      Patient reports coronary angiogram at [place], [place] approx 10 years ago    CV CORONARY ANGIOGRAM N/A 11/29/2022    Procedure: Coronary Angiogram;  Surgeon: Edwin Meadows MD;  Location:  [facility]    CV TRANSCATHETER AORTIC VALVE REPLACEMENT-FEMORAL APPROACH N/A 2/7/2023    Procedure: Transcatheter Aortic Valve Replacement-Femoral Approach;  Surgeon: Juan Jang MD;  Location:  [facility]ARD[facility] CATH LAB    EP PACEMAKER DEVICE & LEAD IMPLANT- RIGHT ATRIAL & RIGHT VENTRICULAR N/A 2/13/2023    Procedure: Pacemaker Device & Lead Implant - Right Atrial & Right Ventricular;  Surgeon: Felix Pabon MD;  Location:  [facility] CATH LAB       Physical Exam     Patient Vitals for the past 24 hrs:   BP Temp Temp src Pulse Resp SpO2 Weight   10/26/24 2127 122/64 97.9  F (36.6  C) Temporal 64 16 100 % 89.4 kg (197 lb)     Physical Exam  Eyes:  Sclera white; Pupils are equal and round  ENT:    External ears and nares normal  CV:  Rate as above with regular rhythm, cardiac device present  Resp:  Breath sounds clear and equal bilaterally    Non-labored, no retractions or accessory muscle use  GI:  Abdomen is soft, non-tender, non-distended    No rebound tenderness or peritoneal features  MS:  Moves all extremities  Skin:  Warm and dry  Neuro:  Speech is easily understood, alert      Diagnostics     Lab Results   Labs Ordered and Resulted from Time of ED Arrival to Time of ED Departure   COMPREHENSIVE METABOLIC PANEL - Abnormal        Result Value    Sodium 139      Potassium 4.0      Carbon Dioxide (CO2) 21 (*)     Anion Gap 18 (*)     Urea Nitrogen 17.3      Creatinine 0.93      GFR Estimate 82      Calcium 8.6 (*)     Chloride 100      Glucose 149 (*)     Alkaline Phosphatase 100      AST 29      ALT 36      Protein Total 7.2      Albumin 4.4      Bilirubin Total 0.3     ETHYL ALCOHOL LEVEL - Abnormal    Alcohol ethyl 0.04 (*)    CBC WITH PLATELETS AND DIFFERENTIAL - Abnormal    WBC Count 9.0      RBC Count 5.06      Hemoglobin 15.5      Hematocrit 46.3      MCV 92      MCH 30.6      MCHC 33.5      RDW 13.4      Platelet Count 142 (*)     % Neutrophils 54      % Lymphocytes 34      % Monocytes 10      % Eosinophils 1      % Basophils 0      % Immature Granulocytes 1      NRBCs per 100 WBC 0      Absolute Neutrophils 4.8      Absolute Lymphocytes 3.1      Absolute Monocytes 0.9      Absolute Eosinophils 0.1      Absolute Basophils 0.0      Absolute Immature Granulocytes 0.1      Absolute NRBCs 0.0     GLUCOSE BY METER - Abnormal    GLUCOSE BY METER POCT 155 (*)    TROPONIN T, HIGH SENSITIVITY - Normal    Troponin T, High Sensitivity 16     MAGNESIUM - Normal    Magnesium 2.3     TROPONIN T, HIGH SENSITIVITY - Normal    Troponin T, High Sensitivity 15         Imaging   No orders to display       EKG   ECG results from 10/26/24   EKG 12-lead, tracing only     Value    Systolic Blood Pressure     Diastolic Blood Pressure     Ventricular Rate 63    Atrial Rate 63    UT Interval 196    QRS Duration 138        QTc 476    P Axis 18    R AXIS 8    T Axis 90    Interpretation ECG      AV dual-paced rhythm  Abnormal ECG  When compared with ECG of 13-Feb-2023 12:28,  Electronic ventricular pacemaker has replaced Electronic atrial pacemaker        Reviewed by myself in the room at 21:38, no STEMI.  Agree w/read.  Most recent prior was scanned 3/25/24 which had same morphology but was read as sinus.    Independent Interpretation   NA    ED Course       Medications Administered   Medications - No data to display    Procedures   Procedures     Discussion of Management   None    ED Course   ED Course as of 10/27/24 0103   Sat Oct 26, 2024   2331 I called and spoke to [employer] since I had not heard or seen the results from the interrogation.  There are stat and routine requests.  Stat should have a same day result, often within several hours.  Call time 7 minutes.       Additional Documentation  None    Medical Decision Making / Diagnosis     MDM   EKG w/o ischemia, dysrhythmia, or pericarditis.  No suggestion of Brugada or WPW.  Alcohol level is not currently high enough to suspect it contributed to presentation but drinking on an empty stomach can have more pronounced effects than alcohol with a full stomach.  He did not have orthostatic changes in blood pressure or heart rate.  Labs checked without critical electrolyte abnormalities, acute renal insufficiency, anemia contributing.  Serial HS troponin normal and stable.  This is not ACS nor myocarditis.  Unable to get device report tonight so I do not know if he was bradycardic vs tachycardic.  Suspect drop in blood pressure as he was pale and needed help.  HR often in the low 60s here.  I do not know what the minimum heart rate is on the device.  Recommended admission on a heart monitor until device interrogation report is available.  He and his friend were discussing this while we waited for the second troponin to result.  He would like to walk around and if feeling fine then he does not want to stay for this reason.  He is aware that I cannot tell him what was going on with his heart and therefore do not know how to safely advise changes.  However with him being more symptomatic since the medication changes, will have him decrease from the extended release metoprolol to 12.5mg BID dosing until follow-up with cardiology.  He confirmed that he is taking lisinopril once a day.  Call Monday.  Return immediately  if recurrent.    Disposition   The patient was discharged.     Diagnosis     ICD-10-CM    1. Near syncope  R55       2. History of atrial fibrillation  Z86.79       3. History of cardiac pacemaker  Z95.0            Discharge Medications   New Prescriptions    METOPROLOL TARTRATE (LOPRESSOR) 25 MG TABLET    Take 0.5 tablets (12.5 mg) by mouth 2 times daily for 7 days.          Blanca Contreras MD  10/27/24 0109

## 2024-10-28 LAB
ATRIAL RATE - MUSE: 63 BPM
DIASTOLIC BLOOD PRESSURE - MUSE: NORMAL MMHG
INTERPRETATION ECG - MUSE: NORMAL
P AXIS - MUSE: 18 DEGREES
PR INTERVAL - MUSE: 196 MS
QRS DURATION - MUSE: 138 MS
QT - MUSE: 466 MS
QTC - MUSE: 476 MS
R AXIS - MUSE: 8 DEGREES
SYSTOLIC BLOOD PRESSURE - MUSE: NORMAL MMHG
T AXIS - MUSE: 90 DEGREES
VENTRICULAR RATE- MUSE: 63 BPM

## 2024-11-04 ENCOUNTER — TELEPHONE (OUTPATIENT)
Dept: CARDIOLOGY | Facility: CLINIC | Age: 84
End: 2024-11-04
Payer: MEDICARE

## 2024-11-04 NOTE — TELEPHONE ENCOUNTER
"Called patient to discuss. Patient states is struggling with dizziness with position changes since adding the toprol XL 25mg daily. Changing from sitting to standing or from bending down to standing, he is getting dizzy \"too much\".  Patient presented to the ED on 10/26/2024 due to a dizzy feeling after eating. He was sitting with family and a meal, felt poorly and his family took him to the ED.   The ED provider recommended that he cut his toprol XL in 1/2 and to take it BID.     Patient did not try this as his PCP said not to cut capsules in 1/2. Reviewed with patient that the medication is in pill form. He will try the 12.5mg BID but wants to know if this is Ok with Dr. Villatoro.  He did lower the lisinopril dose to 10mg daily as directed.      Per Dr. Villatoro's dictation 8/23/2024:  Start metoprolol XL 25 mg daily for PAF.  Cut down lisinopril from 10 mg twice daily to once daily to allow blood pressure room for beta-blocker.  Take lisinopril in the evening.    Will message Dr. Villatoro to review  "

## 2024-11-04 NOTE — TELEPHONE ENCOUNTER
[facility] Call [facility]    Phone Message    May a detailed message be left on voicemail: yes     Reason for Call: Other: Pt would like a call back to discuss his medication as he is getting dizzy spells, he stated he is not having one at this time but would like to discuss changing his medication     Action Taken: Other: Cardio    Travel Screening: Not Applicable     Date of Service:

## 2024-11-08 ENCOUNTER — TELEPHONE (OUTPATIENT)
Dept: CARDIOLOGY | Facility: CLINIC | Age: 84
End: 2024-11-08
Payer: MEDICARE

## 2024-11-08 NOTE — TELEPHONE ENCOUNTER
[facility] Call [facility]    Phone Message    May a detailed message be left on voicemail: yes     Reason for Call: Other: Dr. Buddy Torres called and is requesting Dr. Villatoro to call [phone]. Would like to discuss some things about the pt.      Action Taken: Other: Cardiology     Travel Screening: Not Applicable     Thank you!  [facility]

## 2024-11-11 NOTE — TELEPHONE ENCOUNTER
Kulwinder Villatoro MD Anderson, Barbara E, RN; [facility] 250 minutes ago (3:06 PM)     1.  Stop metoprolol.  2.  See next available ENRIKE.    SJ     =============================    Patient called with above update from Dr. Villatoro. Patient reports he has since been in contact with his PCP Dr. Torres. Patient has stopped doxazosin 4 mg daily and continues on the metoprolol 25 mg daily. Reports he seems to be having less dizzy spells and would like to continue with this. Reports he has a follow up with his PCP in a week and half. Message routed to Dr. Villatoro.

## 2024-11-14 NOTE — TELEPHONE ENCOUNTER
Kulwinder Villatoro MD  You; [facility] 22 minutes ago (9:45 AM)     That sounds reasonable. Thanks.    SJ     You  Kulwinder Villatoro MD; [facility] 23 days ago     Please see med change recommended by already by patient's PCP. Thanks!

## 2024-11-14 NOTE — TELEPHONE ENCOUNTER
Message routed to Dr Villatoro. Med changes have been made by PCP and Dr. Villatoro is aware. (See other telephone encounter).

## 2024-11-19 ENCOUNTER — TRANSFERRED RECORDS (OUTPATIENT)
Dept: HEALTH INFORMATION MANAGEMENT | Facility: CLINIC | Age: 84
End: 2024-11-19
Payer: MEDICARE

## 2024-11-20 ENCOUNTER — MEDICAL CORRESPONDENCE (OUTPATIENT)
Dept: HEALTH INFORMATION MANAGEMENT | Facility: CLINIC | Age: 84
End: 2024-11-20
Payer: MEDICARE

## 2024-11-20 DIAGNOSIS — R55 NEAR SYNCOPE: Primary | ICD-10-CM

## 2024-12-05 ENCOUNTER — DOCUMENTATION ONLY (OUTPATIENT)
Dept: CARDIOLOGY | Facility: CLINIC | Age: 84
End: 2024-12-05
Payer: MEDICARE

## 2024-12-05 NOTE — PROGRESS NOTES
Request for ziopatch report faxed to [facility], [place] @ [phone]. 14-day zio probably mailed around 12/3/2024.

## 2024-12-06 ENCOUNTER — OFFICE VISIT (OUTPATIENT)
Dept: CARDIOLOGY | Facility: CLINIC | Age: 84
End: 2024-12-06
Payer: MEDICARE

## 2024-12-06 VITALS
WEIGHT: 195.3 LBS | OXYGEN SATURATION: 94 % | BODY MASS INDEX: 29.6 KG/M2 | HEIGHT: 68 IN | HEART RATE: 82 BPM | SYSTOLIC BLOOD PRESSURE: 127 MMHG | DIASTOLIC BLOOD PRESSURE: 80 MMHG

## 2024-12-06 DIAGNOSIS — R44.1 SPELLS OF FORMED VISUAL HALLUCINATIONS: Primary | ICD-10-CM

## 2024-12-06 DIAGNOSIS — E78.5 HYPERLIPIDEMIA LDL GOAL <70: ICD-10-CM

## 2024-12-06 DIAGNOSIS — I10 BENIGN ESSENTIAL HYPERTENSION: ICD-10-CM

## 2024-12-06 DIAGNOSIS — Z95.3 S/P TAVR (TRANSCATHETER AORTIC VALVE REPLACEMENT), BIOPROSTHETIC: ICD-10-CM

## 2024-12-06 DIAGNOSIS — R55 NEAR SYNCOPE: ICD-10-CM

## 2024-12-06 DIAGNOSIS — I48.0 PAROXYSMAL ATRIAL FIBRILLATION (H): ICD-10-CM

## 2024-12-06 DIAGNOSIS — Z95.0 CARDIAC PACEMAKER IN SITU: ICD-10-CM

## 2024-12-06 PROCEDURE — G2211 COMPLEX E/M VISIT ADD ON: HCPCS | Performed by: INTERNAL MEDICINE

## 2024-12-06 PROCEDURE — 99214 OFFICE O/P EST MOD 30 MIN: CPT | Performed by: INTERNAL MEDICINE

## 2024-12-06 RX ORDER — LISINOPRIL 10 MG/1
10 TABLET ORAL DAILY
COMMUNITY
Start: 2024-12-06 | End: 2024-12-06

## 2024-12-06 NOTE — PROGRESS NOTES
SERVICE DATE: December 6, 2024      DIAGNOSES/ASSESSMENT:    Recent emergency room evaluation for near syncope. Episode 1 month ago with facial paresthesia. ED evaluation normal. /64, pulse 64. Likely due to hypotension. Occasional lightheadedness when standing quickly.  Will modify antihypertensive therapy.  Episodes of flashing visual disturbances lasting 15 minutes.  See neurology.  Paroxysmal Atrial Fibrillation. Pacemaker interrogation 9/4/2024 shows atrial pacing 55%, ventricular pacing 100%. AF 11% of time, longest episode 1h 13min.  Continue metoprolol and Eliquis.  Well-functioning transcatheter bioprosthetic aortic valve, replaced 2023.  Mean gradient 9 mmHg, no PVL, normal LVEF 65%.  Mixed dyslipidemia with LDL at goal at 59. Continue rosuvastatin 10 mg daily.     PLAN:    Discontinue lisinopril.  Continue metoprolol 25 mg AM as sole antihypertensive.  Continue Eliquis for stroke prevention.  Encouraged adequate hydration and avoid alcohol consumption.  Pending consultation with neurologist for evaluation of possible atypical migraines or partial seizures.  Follow-up in 6 months.      Kulwinder Villatoro MD  Cardiology        HISTORY OF PRESENT ILLNESS:  Reynaldo Monteiro is a 83-year-old male who is status post recent transcatheter bioprosthetic aortic valve replacement with a 29 mm Villanueva ultra valve on 2/7/2023, complicated by postoperative high-grade AV block for which a dual-chamber pacemaker was implanted on 2/13/2023, mild coronary artery disease on angiogram dated 7/29/2022, type 2 diabetes mellitus, hypertension, hyperlipidemia, paroxysmal atrial fibrillation (on Eliquis anticoagulation).  BMI 30, never tobacco user.     Patient reports experiencing near-syncope episodes for 1 month, characterized by lightheadedness, lack of energy, and occasional dizziness upon standing.  On 10/26/2024, he was evaluated in the emergency room for a near syncopal episode that occurred with classic  "prodrome of dizziness while he was standing on his feet.  ER workup unremarkable with normal labs, normal EKG.  Doxazosin 2 mg at bedtime discontinued.  Symptoms have improved but still occasionally gets lightheaded.  Currently on lisinopril and metoprolol XL.  BP today 127/80, pulse 82.  No orthostatic hypotension.  Cardiac auscultation reveals regular heart sounds very soft systolic murmur, normal JVP and no lower extremity edema.      He has recently recovered from a cold with cough and fever. They describe a transient sensation \"like electricity\" across their eyes, lasting about 15 minutes, occurring 1-2 times per week. No loss of consciousness during these episodes.  No associated chest pain or other cardiac symptoms.      Labs: Troponin Normal, Hgb 15.5, Blood alcohol 0.04, Creatinine 0.93, K 4.0, Na 139, LFTs Normal.    EKG: Paced A-V rhythm at 63 bpm, pacemaker functioning appropriately.    Pacemaker interrogation (9/4/2024): Atrial pacing 55%, Ventricular pacing 100%, Paroxysmal AF 11%, Longest AF episode 1h 13min.    Echo (3/25/2024): Bioprosthetic aortic valve functioning well, no regurgitation, Mean gradient 9 mmHg, EF 65%, RV Normal, Tricuspid valve trivial regurgitation, Pacemaker lead visualized in RV.    Coronary angiogram (2/2023): No obstructive CAD.    Established patient.   Moderate complexity medical decision making.    The longitudinal plan of care for the condition(s) below were addressed during this visit. Due to the added complexity in care, I will continue to support Reynaldo in the subsequent management of this condition(s) and with the ongoing continuity of care of this condition(s).  Near syncope and hypotension.    This note was completed in part using dictation via the Dragon voice recognition software. Some word and grammatical errors may occur and must be interpreted in the appropriate clinical context. If there are any questions pertaining to this issue, please contact me for further " "clarification.     Orders Placed This Encounter   Procedures    Adult Neurology  Referral    Follow-Up with Cardiology         Vitals: /80 (BP Location: Right arm, Patient Position: Sitting)   Pulse 82   Ht 1.727 m (5' 8\")   Wt 88.6 kg (195 lb 4.8 oz)   SpO2 94%   BMI 29.70 kg/m        CURRENT MEDICATIONS:  Current Outpatient Medications   Medication Sig Dispense Refill    amoxicillin (AMOXIL) 500 MG capsule Take 4 capsules (2,000 mg) by mouth as needed (30-60 minutes prior to dental procedures) 4 capsule 11    apixaban ANTICOAGULANT (ELIQUIS ANTICOAGULANT) 5 MG tablet Take 1 tablet (5 mg) by mouth 2 times daily. 200 tablet 6    empagliflozin (JARDIANCE) 10 MG TABS tablet Take 10 mg by mouth daily      glucosamine 500 MG CAPS capsule Take 500 mg by mouth daily      metoprolol succinate ER (TOPROL XL) 25 MG 24 hr tablet Take 1 tablet (25 mg) by mouth every morning. 100 tablet 6    Multiple Vitamins-Minerals (ZINC PO) Take 1 tablet by mouth daily Patient unsure of dose      polyethylene glycol (MIRALAX) 17 GM/Dose powder Take 1 capful. by mouth daily as needed for constipation.      rosuvastatin (CRESTOR) 10 MG tablet Take 1 tablet (10 mg) by mouth daily. 100 tablet 6    tadalafil (CIALIS) 20 MG tablet Take 20 mg by mouth daily as needed      vitamin C with B complex (B COMPLEX-C) tablet Take 1 tablet by mouth daily      vitamin D3 (CHOLECALCIFEROL) 50 mcg (2000 units) tablet Take 1 tablet by mouth daily      vitamin E 400 UNIT capsule Take 400 Units by mouth daily           ALLERGIES:  No Known Allergies          "

## 2024-12-06 NOTE — LETTER
12/6/2024    MICHELLE WATKINS MD  [facility] [address] [facility] [place] [address]    RE: Reynaldo Monteiro       Dear Colleague,     I [facility] the [facility].      SERVICE DATE: December 6, 2024      DIAGNOSES/ASSESSMENT:    Recent emergency room evaluation for near syncope. Episode 1 month ago with facial paresthesia. ED evaluation normal. /64, pulse 64. Likely due to hypotension. Occasional lightheadedness when standing quickly.  Will modify antihypertensive therapy.  Episodes of flashing visual disturbances lasting 15 minutes.  See neurology.  Paroxysmal Atrial Fibrillation. Pacemaker interrogation 9/4/2024 shows atrial pacing 55%, ventricular pacing 100%. AF 11% of time, longest episode 1h 13min.  Continue metoprolol and Eliquis.  Well-functioning transcatheter bioprosthetic aortic valve, replaced 2023.  Mean gradient 9 mmHg, no PVL, normal LVEF 65%.  Mixed dyslipidemia with LDL at goal at 59. Continue rosuvastatin 10 mg daily.     PLAN:    Discontinue lisinopril.  Continue metoprolol 25 mg AM as sole antihypertensive.  Continue Eliquis for stroke prevention.  Encouraged adequate hydration and avoid alcohol consumption.  Pending consultation with neurologist for evaluation of possible atypical migraines or partial seizures.  Follow-up in 6 months.      Kulwinder Villatoro MD  Cardiology        HISTORY OF PRESENT ILLNESS:  Reynaldo Monteiro is a 83-year-old male who is status post recent transcatheter bioprosthetic aortic valve replacement with a 29 mm Villanueva ultra valve on 2/7/2023, complicated by postoperative high-grade AV block for which a dual-chamber pacemaker was implanted on 2/13/2023, mild coronary artery disease on angiogram dated 7/29/2022, type 2 diabetes mellitus, hypertension, hyperlipidemia, paroxysmal atrial fibrillation (on Eliquis anticoagulation).  BMI 30, never tobacco user.     Patient reports experiencing near-syncope  "episodes for 1 month, characterized by lightheadedness, lack of energy, and occasional dizziness upon standing.  On 10/26/2024, he was evaluated in the emergency room for a near syncopal episode that occurred with classic prodrome of dizziness while he was standing on his feet.  ER workup unremarkable with normal labs, normal EKG.  Doxazosin 2 mg at bedtime discontinued.  Symptoms have improved but still occasionally gets lightheaded.  Currently on lisinopril and metoprolol XL.  BP today 127/80, pulse 82.  No orthostatic hypotension.  Cardiac auscultation reveals regular heart sounds very soft systolic murmur, normal JVP and no lower extremity edema.      He has recently recovered from a cold with cough and fever. They describe a transient sensation \"like electricity\" across their eyes, lasting about 15 minutes, occurring 1-2 times per week. No loss of consciousness during these episodes.  No associated chest pain or other cardiac symptoms.      Labs: Troponin Normal, Hgb 15.5, Blood alcohol 0.04, Creatinine 0.93, K 4.0, Na 139, LFTs Normal.    EKG: Paced A-V rhythm at 63 bpm, pacemaker functioning appropriately.    Pacemaker interrogation (9/4/2024): Atrial pacing 55%, Ventricular pacing 100%, Paroxysmal AF 11%, Longest AF episode 1h 13min.    Echo (3/25/2024): Bioprosthetic aortic valve functioning well, no regurgitation, Mean gradient 9 mmHg, EF 65%, RV Normal, Tricuspid valve trivial regurgitation, Pacemaker lead visualized in RV.    Coronary angiogram (2/2023): No obstructive CAD.    Established patient.   Moderate complexity medical decision making.    The longitudinal plan of care for the condition(s) below were addressed during this visit. Due to the added complexity in care, I will continue to support Reynaldo in the subsequent management of this condition(s) and with the ongoing continuity of care of this condition(s).  Near syncope and hypotension.    This note was completed in part using dictation via the " "Dragon voice recognition software. Some word and grammatical errors may occur and must be interpreted in the appropriate clinical context. If there are any questions pertaining to this issue, please contact me for further clarification.     Orders Placed This Encounter   Procedures     Adult Neurology  Referral     Follow-Up with Cardiology         Vitals: /80 (BP Location: Right arm, Patient Position: Sitting)   Pulse 82   Ht 1.727 m (5' 8\")   Wt 88.6 kg (195 lb 4.8 oz)   SpO2 94%   BMI 29.70 kg/m        CURRENT MEDICATIONS:  Current Outpatient Medications   Medication Sig Dispense Refill     amoxicillin (AMOXIL) 500 MG capsule Take 4 capsules (2,000 mg) by mouth as needed (30-60 minutes prior to dental procedures) 4 capsule 11     apixaban ANTICOAGULANT (ELIQUIS ANTICOAGULANT) 5 MG tablet Take 1 tablet (5 mg) by mouth 2 times daily. 200 tablet 6     empagliflozin (JARDIANCE) 10 MG TABS tablet Take 10 mg by mouth daily       glucosamine 500 MG CAPS capsule Take 500 mg by mouth daily       metoprolol succinate ER (TOPROL XL) 25 MG 24 hr tablet Take 1 tablet (25 mg) by mouth every morning. 100 tablet 6     Multiple Vitamins-Minerals (ZINC PO) Take 1 tablet by mouth daily Patient unsure of dose       polyethylene glycol (MIRALAX) 17 GM/Dose powder Take 1 capful. by mouth daily as needed for constipation.       rosuvastatin (CRESTOR) 10 MG tablet Take 1 tablet (10 mg) by mouth daily. 100 tablet 6     tadalafil (CIALIS) 20 MG tablet Take 20 mg by mouth daily as needed       vitamin C with B complex (B COMPLEX-C) tablet Take 1 tablet by mouth daily       vitamin D3 (CHOLECALCIFEROL) 50 mcg (2000 units) tablet Take 1 tablet by mouth daily       vitamin E 400 UNIT capsule Take 400 Units by mouth daily           ALLERGIES:  No Known Allergies            Thank you for allowing me to participate in the care of your patient.      Sincerely,     Kulwinder Villatoro MD     [facility]" [facility]  cc:   Mick Torres MD  [facility]  [address] [place] [id number]

## 2024-12-09 ENCOUNTER — ANCILLARY PROCEDURE (OUTPATIENT)
Dept: CARDIOLOGY | Facility: CLINIC | Age: 84
End: 2024-12-09
Attending: INTERNAL MEDICINE
Payer: MEDICARE

## 2024-12-09 DIAGNOSIS — Z95.0 CARDIAC PACEMAKER IN SITU: ICD-10-CM

## 2024-12-09 LAB
MDC_IDC_LEAD_CONNECTION_STATUS: NORMAL
MDC_IDC_LEAD_CONNECTION_STATUS: NORMAL
MDC_IDC_LEAD_IMPLANT_DT: NORMAL
MDC_IDC_LEAD_IMPLANT_DT: NORMAL
MDC_IDC_LEAD_LOCATION: NORMAL
MDC_IDC_LEAD_LOCATION: NORMAL
MDC_IDC_LEAD_LOCATION_DETAIL_1: NORMAL
MDC_IDC_LEAD_LOCATION_DETAIL_1: NORMAL
MDC_IDC_LEAD_MFG: NORMAL
MDC_IDC_LEAD_MFG: NORMAL
MDC_IDC_LEAD_MODEL: NORMAL
MDC_IDC_LEAD_MODEL: NORMAL
MDC_IDC_LEAD_POLARITY_TYPE: NORMAL
MDC_IDC_LEAD_POLARITY_TYPE: NORMAL
MDC_IDC_LEAD_SERIAL: NORMAL
MDC_IDC_LEAD_SERIAL: NORMAL
MDC_IDC_MSMT_BATTERY_DTM: NORMAL
MDC_IDC_MSMT_BATTERY_REMAINING_PERCENTAGE: 85 %
MDC_IDC_MSMT_BATTERY_STATUS: NORMAL
MDC_IDC_MSMT_LEADCHNL_RA_IMPEDANCE_VALUE: 566 OHM
MDC_IDC_MSMT_LEADCHNL_RA_LEAD_CHANNEL_STATUS: NORMAL
MDC_IDC_MSMT_LEADCHNL_RV_IMPEDANCE_VALUE: 507 OHM
MDC_IDC_MSMT_LEADCHNL_RV_LEAD_CHANNEL_STATUS: NORMAL
MDC_IDC_MSMT_LEADCHNL_RV_PACING_THRESHOLD_AMPLITUDE: 0.8 V
MDC_IDC_MSMT_LEADCHNL_RV_PACING_THRESHOLD_PULSEWIDTH: 0.4 MS
MDC_IDC_PG_IMPLANT_DTM: NORMAL
MDC_IDC_PG_MFG: NORMAL
MDC_IDC_PG_MODEL: NORMAL
MDC_IDC_PG_SERIAL: NORMAL
MDC_IDC_PG_TYPE: NORMAL
MDC_IDC_SESS_CLINIC_NAME: NORMAL
MDC_IDC_SESS_DTM: NORMAL
MDC_IDC_SESS_REPROGRAMMED: NO
MDC_IDC_SESS_TYPE: NORMAL
MDC_IDC_SET_BRADY_AT_MODE_SWITCH_MODE: NORMAL
MDC_IDC_SET_BRADY_AT_MODE_SWITCH_RATE: 180 {BEATS}/MIN
MDC_IDC_SET_BRADY_LOWRATE: 60 {BEATS}/MIN
MDC_IDC_SET_BRADY_MAX_SENSOR_RATE: 120 {BEATS}/MIN
MDC_IDC_SET_BRADY_MAX_TRACKING_RATE: 130 {BEATS}/MIN
MDC_IDC_SET_BRADY_MODE: NORMAL
MDC_IDC_SET_BRADY_PAV_DELAY_HIGH: 160 MS
MDC_IDC_SET_BRADY_PAV_DELAY_LOW: 200 MS
MDC_IDC_SET_BRADY_SAV_DELAY_HIGH: 130 MS
MDC_IDC_SET_BRADY_SAV_DELAY_LOW: 170 MS
MDC_IDC_SET_LEADCHNL_RA_PACING_AMPLITUDE: 2 V
MDC_IDC_SET_LEADCHNL_RA_PACING_ANODE_ELECTRODE_1: NORMAL
MDC_IDC_SET_LEADCHNL_RA_PACING_ANODE_LOCATION_1: NORMAL
MDC_IDC_SET_LEADCHNL_RA_PACING_CAPTURE_MODE: NORMAL
MDC_IDC_SET_LEADCHNL_RA_PACING_CATHODE_ELECTRODE_1: NORMAL
MDC_IDC_SET_LEADCHNL_RA_PACING_CATHODE_LOCATION_1: NORMAL
MDC_IDC_SET_LEADCHNL_RA_PACING_POLARITY: NORMAL
MDC_IDC_SET_LEADCHNL_RA_PACING_PULSEWIDTH: 0.4 MS
MDC_IDC_SET_LEADCHNL_RA_SENSING_ADAPTATION_MODE: NORMAL
MDC_IDC_SET_LEADCHNL_RA_SENSING_ANODE_ELECTRODE_1: NORMAL
MDC_IDC_SET_LEADCHNL_RA_SENSING_ANODE_LOCATION_1: NORMAL
MDC_IDC_SET_LEADCHNL_RA_SENSING_CATHODE_ELECTRODE_1: NORMAL
MDC_IDC_SET_LEADCHNL_RA_SENSING_CATHODE_LOCATION_1: NORMAL
MDC_IDC_SET_LEADCHNL_RA_SENSING_POLARITY: NORMAL
MDC_IDC_SET_LEADCHNL_RV_PACING_AMPLITUDE: 1.3 V
MDC_IDC_SET_LEADCHNL_RV_PACING_ANODE_ELECTRODE_1: NORMAL
MDC_IDC_SET_LEADCHNL_RV_PACING_ANODE_LOCATION_1: NORMAL
MDC_IDC_SET_LEADCHNL_RV_PACING_CAPTURE_MODE: NORMAL
MDC_IDC_SET_LEADCHNL_RV_PACING_CATHODE_ELECTRODE_1: NORMAL
MDC_IDC_SET_LEADCHNL_RV_PACING_CATHODE_LOCATION_1: NORMAL
MDC_IDC_SET_LEADCHNL_RV_PACING_POLARITY: NORMAL
MDC_IDC_SET_LEADCHNL_RV_PACING_PULSEWIDTH: 0.4 MS
MDC_IDC_SET_LEADCHNL_RV_SENSING_ADAPTATION_MODE: NORMAL
MDC_IDC_SET_LEADCHNL_RV_SENSING_ANODE_ELECTRODE_1: NORMAL
MDC_IDC_SET_LEADCHNL_RV_SENSING_ANODE_LOCATION_1: NORMAL
MDC_IDC_SET_LEADCHNL_RV_SENSING_CATHODE_ELECTRODE_1: NORMAL
MDC_IDC_SET_LEADCHNL_RV_SENSING_CATHODE_LOCATION_1: NORMAL
MDC_IDC_SET_LEADCHNL_RV_SENSING_POLARITY: NORMAL
MDC_IDC_STAT_AT_BURDEN_PERCENT: 10 %
MDC_IDC_STAT_AT_DTM_END: NORMAL
MDC_IDC_STAT_AT_DTM_START: NORMAL
MDC_IDC_STAT_AT_MODE_SW_COUNT_PER_DAY: 37
MDC_IDC_STAT_AT_MODE_SW_PERCENT_TIME_PER_DAY: 10 %
MDC_IDC_STAT_BRADY_AP_VP_PERCENT: 59 %
MDC_IDC_STAT_BRADY_AP_VS_PERCENT: 0 %
MDC_IDC_STAT_BRADY_AS_VP_PERCENT: 38 %
MDC_IDC_STAT_BRADY_AS_VS_PERCENT: 0 %
MDC_IDC_STAT_BRADY_DTM_END: NORMAL
MDC_IDC_STAT_BRADY_DTM_START: NORMAL
MDC_IDC_STAT_BRADY_RA_PERCENT_PACED: 55 %
MDC_IDC_STAT_BRADY_RV_PERCENT_PACED: 97 %
MDC_IDC_STAT_HEART_RATE_ATRIAL_MEAN: 96 {BEATS}/MIN
MDC_IDC_STAT_HEART_RATE_DTM_END: NORMAL
MDC_IDC_STAT_HEART_RATE_DTM_START: NORMAL
MDC_IDC_STAT_HEART_RATE_VENTRICULAR_MEAN: 74 {BEATS}/MIN

## 2024-12-09 PROCEDURE — 93296 REM INTERROG EVL PM/IDS: CPT | Performed by: INTERNAL MEDICINE

## 2024-12-09 PROCEDURE — 93294 REM INTERROG EVL PM/LDLS PM: CPT | Performed by: INTERNAL MEDICINE

## 2025-01-23 ENCOUNTER — TELEPHONE (OUTPATIENT)
Dept: CARDIOLOGY | Facility: CLINIC | Age: 85
End: 2025-01-23
Payer: MEDICARE

## 2025-01-23 NOTE — TELEPHONE ENCOUNTER
Attempted to contact patient to locate a copy of his ziopatch report. Per patient, his provider moved to a new clinic. Left a detailed message asking for that contact information so we can request the report. Or patient can have the new clinic fax a copy. Or the patient can send us a copy of his own.     Left message for patient to call back to Team 2 Gretchen @ [phone]

## 2025-02-19 NOTE — CONFIDENTIAL NOTE
Reason for visit: Spells of formed visual hallucinations    Referring Provider: Kulwinder Villatoro MD   [facility]   Office Visit Notes: 12/6/2024       All IMAGING   STATUS/LOCATION   DATE   MRI/MRA N/A     CT/CTA N/A  11/3/2022    LABS Internal    EEG N/A    EMG N/A    NEUROPSYCH   TEST: N/A

## 2025-03-10 ENCOUNTER — ANCILLARY PROCEDURE (OUTPATIENT)
Dept: CARDIOLOGY | Facility: CLINIC | Age: 85
End: 2025-03-10
Attending: INTERNAL MEDICINE
Payer: MEDICARE

## 2025-03-10 DIAGNOSIS — Z95.0 CARDIAC PACEMAKER IN SITU: ICD-10-CM

## 2025-03-10 LAB
MDC_IDC_EPISODE_DTM: NORMAL
MDC_IDC_EPISODE_ID: 27
MDC_IDC_EPISODE_TYPE: NORMAL
MDC_IDC_EPISODE_TYPE_INDUCED: NO
MDC_IDC_EPISODE_VENDOR_TYPE: NORMAL
MDC_IDC_LEAD_CONNECTION_STATUS: NORMAL
MDC_IDC_LEAD_CONNECTION_STATUS: NORMAL
MDC_IDC_LEAD_IMPLANT_DT: NORMAL
MDC_IDC_LEAD_IMPLANT_DT: NORMAL
MDC_IDC_LEAD_LOCATION: NORMAL
MDC_IDC_LEAD_LOCATION: NORMAL
MDC_IDC_LEAD_LOCATION_DETAIL_1: NORMAL
MDC_IDC_LEAD_LOCATION_DETAIL_1: NORMAL
MDC_IDC_LEAD_MFG: NORMAL
MDC_IDC_LEAD_MFG: NORMAL
MDC_IDC_LEAD_MODEL: NORMAL
MDC_IDC_LEAD_MODEL: NORMAL
MDC_IDC_LEAD_POLARITY_TYPE: NORMAL
MDC_IDC_LEAD_POLARITY_TYPE: NORMAL
MDC_IDC_LEAD_SERIAL: NORMAL
MDC_IDC_LEAD_SERIAL: NORMAL
MDC_IDC_MSMT_BATTERY_DTM: NORMAL
MDC_IDC_MSMT_BATTERY_REMAINING_PERCENTAGE: 80 %
MDC_IDC_MSMT_BATTERY_STATUS: NORMAL
MDC_IDC_MSMT_LEADCHNL_RA_IMPEDANCE_VALUE: 566 OHM
MDC_IDC_MSMT_LEADCHNL_RA_LEAD_CHANNEL_STATUS: NORMAL
MDC_IDC_MSMT_LEADCHNL_RV_IMPEDANCE_VALUE: 546 OHM
MDC_IDC_MSMT_LEADCHNL_RV_LEAD_CHANNEL_STATUS: NORMAL
MDC_IDC_MSMT_LEADCHNL_RV_PACING_THRESHOLD_AMPLITUDE: 0.8 V
MDC_IDC_MSMT_LEADCHNL_RV_PACING_THRESHOLD_PULSEWIDTH: 0.4 MS
MDC_IDC_PG_IMPLANT_DTM: NORMAL
MDC_IDC_PG_MFG: NORMAL
MDC_IDC_PG_MODEL: NORMAL
MDC_IDC_PG_SERIAL: NORMAL
MDC_IDC_PG_TYPE: NORMAL
MDC_IDC_SESS_CLINIC_NAME: NORMAL
MDC_IDC_SESS_DTM: NORMAL
MDC_IDC_SESS_REPROGRAMMED: NO
MDC_IDC_SESS_TYPE: NORMAL
MDC_IDC_SET_BRADY_AT_MODE_SWITCH_MODE: NORMAL
MDC_IDC_SET_BRADY_AT_MODE_SWITCH_RATE: 180 {BEATS}/MIN
MDC_IDC_SET_BRADY_LOWRATE: 60 {BEATS}/MIN
MDC_IDC_SET_BRADY_MAX_SENSOR_RATE: 120 {BEATS}/MIN
MDC_IDC_SET_BRADY_MAX_TRACKING_RATE: 130 {BEATS}/MIN
MDC_IDC_SET_BRADY_MODE: NORMAL
MDC_IDC_SET_BRADY_PAV_DELAY_HIGH: 160 MS
MDC_IDC_SET_BRADY_PAV_DELAY_LOW: 200 MS
MDC_IDC_SET_BRADY_SAV_DELAY_HIGH: 130 MS
MDC_IDC_SET_BRADY_SAV_DELAY_LOW: 170 MS
MDC_IDC_SET_LEADCHNL_RA_PACING_AMPLITUDE: 2 V
MDC_IDC_SET_LEADCHNL_RA_PACING_ANODE_ELECTRODE_1: NORMAL
MDC_IDC_SET_LEADCHNL_RA_PACING_ANODE_LOCATION_1: NORMAL
MDC_IDC_SET_LEADCHNL_RA_PACING_CAPTURE_MODE: NORMAL
MDC_IDC_SET_LEADCHNL_RA_PACING_CATHODE_ELECTRODE_1: NORMAL
MDC_IDC_SET_LEADCHNL_RA_PACING_CATHODE_LOCATION_1: NORMAL
MDC_IDC_SET_LEADCHNL_RA_PACING_POLARITY: NORMAL
MDC_IDC_SET_LEADCHNL_RA_PACING_PULSEWIDTH: 0.4 MS
MDC_IDC_SET_LEADCHNL_RA_SENSING_ADAPTATION_MODE: NORMAL
MDC_IDC_SET_LEADCHNL_RA_SENSING_ANODE_ELECTRODE_1: NORMAL
MDC_IDC_SET_LEADCHNL_RA_SENSING_ANODE_LOCATION_1: NORMAL
MDC_IDC_SET_LEADCHNL_RA_SENSING_CATHODE_ELECTRODE_1: NORMAL
MDC_IDC_SET_LEADCHNL_RA_SENSING_CATHODE_LOCATION_1: NORMAL
MDC_IDC_SET_LEADCHNL_RA_SENSING_POLARITY: NORMAL
MDC_IDC_SET_LEADCHNL_RV_PACING_AMPLITUDE: 1.3 V
MDC_IDC_SET_LEADCHNL_RV_PACING_ANODE_ELECTRODE_1: NORMAL
MDC_IDC_SET_LEADCHNL_RV_PACING_ANODE_LOCATION_1: NORMAL
MDC_IDC_SET_LEADCHNL_RV_PACING_CAPTURE_MODE: NORMAL
MDC_IDC_SET_LEADCHNL_RV_PACING_CATHODE_ELECTRODE_1: NORMAL
MDC_IDC_SET_LEADCHNL_RV_PACING_CATHODE_LOCATION_1: NORMAL
MDC_IDC_SET_LEADCHNL_RV_PACING_POLARITY: NORMAL
MDC_IDC_SET_LEADCHNL_RV_PACING_PULSEWIDTH: 0.4 MS
MDC_IDC_SET_LEADCHNL_RV_SENSING_ADAPTATION_MODE: NORMAL
MDC_IDC_SET_LEADCHNL_RV_SENSING_ANODE_ELECTRODE_1: NORMAL
MDC_IDC_SET_LEADCHNL_RV_SENSING_ANODE_LOCATION_1: NORMAL
MDC_IDC_SET_LEADCHNL_RV_SENSING_CATHODE_ELECTRODE_1: NORMAL
MDC_IDC_SET_LEADCHNL_RV_SENSING_CATHODE_LOCATION_1: NORMAL
MDC_IDC_SET_LEADCHNL_RV_SENSING_POLARITY: NORMAL
MDC_IDC_STAT_AT_BURDEN_PERCENT: 7 %
MDC_IDC_STAT_AT_DTM_END: NORMAL
MDC_IDC_STAT_AT_DTM_START: NORMAL
MDC_IDC_STAT_AT_MODE_SW_COUNT_PER_DAY: 28
MDC_IDC_STAT_AT_MODE_SW_PERCENT_TIME_PER_DAY: 7 %
MDC_IDC_STAT_BRADY_AP_VP_PERCENT: 60 %
MDC_IDC_STAT_BRADY_AP_VS_PERCENT: 0 %
MDC_IDC_STAT_BRADY_AS_VP_PERCENT: 38 %
MDC_IDC_STAT_BRADY_AS_VS_PERCENT: 0 %
MDC_IDC_STAT_BRADY_DTM_END: NORMAL
MDC_IDC_STAT_BRADY_DTM_START: NORMAL
MDC_IDC_STAT_BRADY_RA_PERCENT_PACED: 56 %
MDC_IDC_STAT_BRADY_RV_PERCENT_PACED: 98 %
MDC_IDC_STAT_HEART_RATE_ATRIAL_MEAN: 89 {BEATS}/MIN
MDC_IDC_STAT_HEART_RATE_DTM_END: NORMAL
MDC_IDC_STAT_HEART_RATE_DTM_START: NORMAL
MDC_IDC_STAT_HEART_RATE_VENTRICULAR_MEAN: 73 {BEATS}/MIN

## 2025-03-10 PROCEDURE — 93296 REM INTERROG EVL PM/IDS: CPT | Performed by: INTERNAL MEDICINE

## 2025-03-10 PROCEDURE — 93294 REM INTERROG EVL PM/LDLS PM: CPT | Performed by: INTERNAL MEDICINE

## 2025-04-09 ENCOUNTER — OFFICE VISIT (OUTPATIENT)
Dept: NEUROLOGY | Facility: CLINIC | Age: 85
End: 2025-04-09
Attending: INTERNAL MEDICINE
Payer: MEDICARE

## 2025-04-09 ENCOUNTER — PRE VISIT (OUTPATIENT)
Dept: NEUROLOGY | Facility: CLINIC | Age: 85
End: 2025-04-09

## 2025-04-09 VITALS
WEIGHT: 194.1 LBS | BODY MASS INDEX: 29.51 KG/M2 | DIASTOLIC BLOOD PRESSURE: 84 MMHG | OXYGEN SATURATION: 96 % | SYSTOLIC BLOOD PRESSURE: 160 MMHG | HEART RATE: 74 BPM

## 2025-04-09 DIAGNOSIS — F40.240 CLAUSTROPHOBIA: ICD-10-CM

## 2025-04-09 DIAGNOSIS — G45.9 TRANSIENT CEREBRAL ISCHEMIA, UNSPECIFIED TYPE: ICD-10-CM

## 2025-04-09 DIAGNOSIS — R44.1 SPELLS OF FORMED VISUAL HALLUCINATIONS: ICD-10-CM

## 2025-04-09 DIAGNOSIS — H53.9 TRANSIENT VISUAL DISTURBANCE: Primary | ICD-10-CM

## 2025-04-09 DIAGNOSIS — G45.8 OTHER TRANSIENT CEREBRAL ISCHEMIC ATTACKS AND RELATED SYNDROMES: ICD-10-CM

## 2025-04-09 PROCEDURE — 3079F DIAST BP 80-89 MM HG: CPT | Performed by: PSYCHIATRY & NEUROLOGY

## 2025-04-09 PROCEDURE — 99205 OFFICE O/P NEW HI 60 MIN: CPT | Performed by: PSYCHIATRY & NEUROLOGY

## 2025-04-09 PROCEDURE — 3077F SYST BP >= 140 MM HG: CPT | Performed by: PSYCHIATRY & NEUROLOGY

## 2025-04-09 RX ORDER — DILTIAZEM HYDROCHLORIDE 240 MG/1
240 CAPSULE, COATED, EXTENDED RELEASE ORAL DAILY
COMMUNITY
Start: 2025-04-03

## 2025-04-09 RX ORDER — LORAZEPAM 1 MG/1
TABLET ORAL
Qty: 2 TABLET | Refills: 0 | Status: SHIPPED | OUTPATIENT
Start: 2025-04-09

## 2025-04-09 NOTE — LETTER
"2025      Reynaldo Monteiro  [address] [address]      Dear Colleague,    Thank you for referring your patient, Reynaldo Monteiro, to the [facility]. Please see a copy of my visit note below.      Neurology Consultation    Patient Name:  Reynaldo Monteiro  MRN:  [id number]    :  1940  Date of Service:  2025  Primary care provider:  Mick Torres        Chief Complaint: Transient visual phenomenon     History of Present Illness:     Reynaldo Monteiro is a 85 year old male who presents for transient visual symptoms.    Had an episode about 10-15 years ago where he ws driving and had flashes of light \"like a lightening strike or flicker of light\" that would start in the low part of his vision in the corner and then gradually spread and travel across his vision. He is uncertain if it is monocular or binocular. Eyes are generally sensitive to light but not particularly during these episodes.  Not associated headache. Lasted for about 10 minutes.  He infrequently got these episodes until he had TAVR about 2 years and were coming more frequently.  After this they decreased in frequency but still having them maybe 1-2/week.  It does not cause loss of vision or diplopia.  He has not identified a pattern or a specific trigger.   Denies change in consciousness with these episodes. Denies history of migraines or regular headaches. Denies family history of migraines.  He gets his eyes checked regularly, at least once a year, nothing new in terms of his vision.     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  Vision has come back to some extent but not back to baseline.  Had cataract surgery 3 years ago.      Past Medical History:  - Aortic stenosis s/p TAVR and pacemaker placement, paroxsymal atrial fibrillation on apixaban, hyperlipidemia, hypertension, diabetes mellitus type II, lumbar stenosis with myelopathy     Social History:  - Self employed, " [employer] business.  Denies tobacco use, recreational drug use, rare alcohol consumption     Allergies:  No Known Allergies    Physical Exam:  BP (!) 160/84   Pulse 74   Wt 88 kg (194 lb 1.6 oz)   SpO2 96%   BMI 29.51 kg/m      General:  No acute distress  Cardiovascular: Normal rate.  Lung: Respirations are non-labored.  Extremities: Normal range of motion  Integumentary: Warm and dry    Neurologic:  Mental status : alert, fund of knowledge appropriate for visit    LANGUAGE: Speech fluent, no dysarthria     CN:  II- pupils equal and reactive, visual fields full, funduscopic exam difficult unable to appreciate fundi   III, IV, VI- extraocular movements intact  V- sensation intact bilaterally  VII- face symmetric except very mild left eyelid ptosis (has been there for 20 years)  VIII- hearing intact, no nystagmus  IX, X- palate elevates symmetrically  XI- sternocleidomastoid 5/5  XII- tongue midline    MOTOR : intact bulk and tone  5/5 strength in all ext     SENSORY : intact to temp and vib in BUE, decrease to temp and vib distally in feet. Romberg +    REFLEXES:       Right Left   Triceps 1+ 1+   Biceps 1+ 1+   Brachioradialis 1+ 1+   Knee jerk 2 2   Ankle jerk 0 0   Lott absent   Toes down going     MOVEMENT/COORDINATION: finger to nose, finger taps (missing left index finger- intact finger taps with middle finger and thumb) , and heel to shin intact bilaterally     GAIT : Appropriate stride length, speed, good arm swing.  Difficulty with tandem gait     Cognition and Speech: Speech clear and coherent.    Psychiatric: Cooperative, Appropriate mood & affect.      Assessment/Plan:   Reynaldo Monteiro is a 85 year old male who presents for transient visual symptoms. Suspect that these episodes are migraine aura/ocular migraines.  It is concerning though that they increased in frequency around his TAVR.  I'd like to obtain imaging to rule out vascular etiologies especially as there has been suggestion of ocular  migraines being a possible herald sign of vascular events.  Discussed starting a preventative and he would like to wait on imaging. Lower suspicion for seizure activity.  He gets his eyes checked regularly.      Plan  > MRI brain, MRA H and N  > Follow-up in 3 months     I spent 65 minutes providing care for this patient, including reviewing imaging, labs, and notes as well as providing counseling and coordination of care for the above issues.      Again, thank you for allowing me to participate in the care of your patient.        Sincerely,        Ignacia Mims, DO    Electronically signed

## 2025-04-09 NOTE — PROGRESS NOTES
"  Neurology Consultation    Patient Name:  Reynaldo Monteiro  MRN:  [id number]    :  1940  Date of Service:  2025  Primary care provider:  Mick Torres        Chief Complaint: Transient visual phenomenon     History of Present Illness:     Reynaldo Monteiro is a 85 year old male who presents for transient visual symptoms.    Had an episode about 10-15 years ago where he ws driving and had flashes of light \"like a lightening strike or flicker of light\" that would start in the low part of his vision in the corner and then gradually spread and travel across his vision. He is uncertain if it is monocular or binocular. Eyes are generally sensitive to light but not particularly during these episodes.  Not associated headache. Lasted for about 10 minutes.  He infrequently got these episodes until he had TAVR about 2 years and were coming more frequently.  After this they decreased in frequency but still having them maybe 1-2/week.  It does not cause loss of vision or diplopia.  He has not identified a pattern or a specific trigger.   Denies change in consciousness with these episodes. Denies history of migraines or regular headaches. Denies family history of migraines.  He gets his eyes checked regularly, at least once a year, nothing new in terms of his vision.     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  Vision has come back to some extent but not back to baseline.  Had cataract surgery 3 years ago.      Past Medical History:  - Aortic stenosis s/p TAVR and pacemaker placement, paroxsymal atrial fibrillation on apixaban, hyperlipidemia, hypertension, diabetes mellitus type II, lumbar stenosis with myelopathy     Social History:  - Self employed, [employer] business.  Denies tobacco use, recreational drug use, rare alcohol consumption     Allergies:  No Known Allergies    Physical Exam:  BP (!) 160/84   Pulse 74   Wt 88 kg (194 lb 1.6 oz)   SpO2 96%   BMI 29.51 kg/m      General:  No acute " distress  Cardiovascular: Normal rate.  Lung: Respirations are non-labored.  Extremities: Normal range of motion  Integumentary: Warm and dry    Neurologic:  Mental status : alert, fund of knowledge appropriate for visit    LANGUAGE: Speech fluent, no dysarthria     CN:  II- pupils equal and reactive, visual fields full, funduscopic exam difficult unable to appreciate fundi   III, IV, VI- extraocular movements intact  V- sensation intact bilaterally  VII- face symmetric except very mild left eyelid ptosis (has been there for 20 years)  VIII- hearing intact, no nystagmus  IX, X- palate elevates symmetrically  XI- sternocleidomastoid 5/5  XII- tongue midline    MOTOR : intact bulk and tone  5/5 strength in all ext     SENSORY : intact to temp and vib in BUE, decrease to temp and vib distally in feet. Romberg +    REFLEXES:       Right Left   Triceps 1+ 1+   Biceps 1+ 1+   Brachioradialis 1+ 1+   Knee jerk 2 2   Ankle jerk 0 0   Lott absent   Toes down going     MOVEMENT/COORDINATION: finger to nose, finger taps (missing left index finger- intact finger taps with middle finger and thumb) , and heel to shin intact bilaterally     GAIT : Appropriate stride length, speed, good arm swing.  Difficulty with tandem gait     Cognition and Speech: Speech clear and coherent.    Psychiatric: Cooperative, Appropriate mood & affect.      Assessment/Plan:   Reynaldo Monteiro is a 85 year old male who presents for transient visual symptoms. Suspect that these episodes are migraine aura/ocular migraines.  It is concerning though that they increased in frequency around his TAVR.  I'd like to obtain imaging to rule out vascular etiologies especially as there has been suggestion of ocular migraines being a possible herald sign of vascular events.  Discussed starting a preventative and he would like to wait on imaging. Lower suspicion for seizure activity.  He gets his eyes checked regularly.      Plan  > MRI brain, MRA H and N  >  Follow-up in 3 months     I spent 65 minutes providing care for this patient, including reviewing imaging, labs, and notes as well as providing counseling and coordination of care for the above issues.

## 2025-04-17 ENCOUNTER — TELEPHONE (OUTPATIENT)
Dept: MEDSURG UNIT | Facility: CLINIC | Age: 85
End: 2025-04-17
Payer: MEDICARE

## 2025-05-05 ENCOUNTER — HOSPITAL ENCOUNTER (OUTPATIENT)
Dept: MRI IMAGING | Facility: CLINIC | Age: 85
Discharge: HOME | End: 2025-05-05
Attending: PSYCHIATRY & NEUROLOGY
Payer: MEDICARE

## 2025-05-05 ENCOUNTER — HOSPITAL ENCOUNTER (OUTPATIENT)
Dept: GENERAL RADIOLOGY | Facility: CLINIC | Age: 85
Discharge: HOME | End: 2025-05-05
Attending: PSYCHIATRY & NEUROLOGY
Payer: MEDICARE

## 2025-05-05 ENCOUNTER — HOSPITAL ENCOUNTER (OUTPATIENT)
Facility: CLINIC | Age: 85
Discharge: HOME | End: 2025-05-05
Admitting: RADIOLOGY
Payer: MEDICARE

## 2025-05-05 VITALS — OXYGEN SATURATION: 93 % | HEART RATE: 90 BPM

## 2025-05-05 DIAGNOSIS — G45.9 TRANSIENT CEREBRAL ISCHEMIA, UNSPECIFIED TYPE: ICD-10-CM

## 2025-05-05 DIAGNOSIS — G45.8 OTHER TRANSIENT CEREBRAL ISCHEMIC ATTACKS AND RELATED SYNDROMES: ICD-10-CM

## 2025-05-05 DIAGNOSIS — H53.9 TRANSIENT VISUAL DISTURBANCE: ICD-10-CM

## 2025-05-05 DIAGNOSIS — Z95.0 PACEMAKER: ICD-10-CM

## 2025-05-05 PROCEDURE — 70549 MR ANGIOGRAPH NECK W/O&W/DYE: CPT

## 2025-05-05 PROCEDURE — 255N000002 HC RX 255 OP 636: Performed by: PSYCHIATRY & NEUROLOGY

## 2025-05-05 PROCEDURE — 999N000154 HC STATISTIC RADIOLOGY XRAY, US, CT, MAR, NM

## 2025-05-05 PROCEDURE — A9585 GADOBUTROL INJECTION: HCPCS | Performed by: PSYCHIATRY & NEUROLOGY

## 2025-05-05 PROCEDURE — 71046 X-RAY EXAM CHEST 2 VIEWS: CPT

## 2025-05-05 PROCEDURE — 70553 MRI BRAIN STEM W/O & W/DYE: CPT

## 2025-05-05 PROCEDURE — 70544 MR ANGIOGRAPHY HEAD W/O DYE: CPT | Mod: XU

## 2025-05-05 RX ORDER — GADOBUTROL 604.72 MG/ML
10 INJECTION INTRAVENOUS ONCE
Status: COMPLETED | OUTPATIENT
Start: 2025-05-05 | End: 2025-05-05

## 2025-05-05 RX ADMIN — GADOBUTROL 10 ML: 604.72 INJECTION INTRAVENOUS at 14:42

## 2025-05-05 ASSESSMENT — ACTIVITIES OF DAILY LIVING (ADL)
ADLS_ACUITY_SCORE: 51

## 2025-05-05 NOTE — PROGRESS NOTES
Pt here in MRI for scanning.  [employer] Pacemaker placed into MRI mode and will go back into its normal settings after leaving the MRI room per ASHLEY Givens at [employer], no need to call back post scanning.    Scanning complete.  Tolerated well.  No complaints  or discomfort.

## 2025-05-09 ENCOUNTER — RESULTS FOLLOW-UP (OUTPATIENT)
Dept: NEUROLOGY | Facility: CLINIC | Age: 85
End: 2025-05-09

## 2025-06-09 ENCOUNTER — ANCILLARY PROCEDURE (OUTPATIENT)
Dept: CARDIOLOGY | Facility: CLINIC | Age: 85
End: 2025-06-09
Attending: INTERNAL MEDICINE
Payer: MEDICARE

## 2025-06-09 DIAGNOSIS — Z95.0 CARDIAC PACEMAKER IN SITU: ICD-10-CM

## 2025-06-09 LAB
MDC_IDC_EPISODE_DTM: NORMAL
MDC_IDC_EPISODE_DURATION: 1366 S
MDC_IDC_EPISODE_DURATION: 14 S
MDC_IDC_EPISODE_DURATION: 1932 S
MDC_IDC_EPISODE_DURATION: 7610 S
MDC_IDC_EPISODE_ID: 30
MDC_IDC_EPISODE_ID: 31
MDC_IDC_EPISODE_ID: 32
MDC_IDC_EPISODE_ID: 33
MDC_IDC_EPISODE_ID: 34
MDC_IDC_EPISODE_ID: 35
MDC_IDC_EPISODE_TYPE: NORMAL
MDC_IDC_EPISODE_TYPE_INDUCED: NO
MDC_IDC_EPISODE_VENDOR_TYPE: NORMAL
MDC_IDC_LEAD_CONNECTION_STATUS: NORMAL
MDC_IDC_LEAD_CONNECTION_STATUS: NORMAL
MDC_IDC_LEAD_IMPLANT_DT: NORMAL
MDC_IDC_LEAD_IMPLANT_DT: NORMAL
MDC_IDC_LEAD_LOCATION: NORMAL
MDC_IDC_LEAD_LOCATION: NORMAL
MDC_IDC_LEAD_LOCATION_DETAIL_1: NORMAL
MDC_IDC_LEAD_LOCATION_DETAIL_1: NORMAL
MDC_IDC_LEAD_MFG: NORMAL
MDC_IDC_LEAD_MFG: NORMAL
MDC_IDC_LEAD_MODEL: NORMAL
MDC_IDC_LEAD_MODEL: NORMAL
MDC_IDC_LEAD_POLARITY_TYPE: NORMAL
MDC_IDC_LEAD_POLARITY_TYPE: NORMAL
MDC_IDC_LEAD_SERIAL: NORMAL
MDC_IDC_LEAD_SERIAL: NORMAL
MDC_IDC_MSMT_BATTERY_DTM: NORMAL
MDC_IDC_MSMT_BATTERY_REMAINING_PERCENTAGE: 80 %
MDC_IDC_MSMT_BATTERY_STATUS: NORMAL
MDC_IDC_MSMT_LEADCHNL_RA_IMPEDANCE_VALUE: 527 OHM
MDC_IDC_MSMT_LEADCHNL_RA_LEAD_CHANNEL_STATUS: NORMAL
MDC_IDC_MSMT_LEADCHNL_RV_IMPEDANCE_VALUE: 527 OHM
MDC_IDC_MSMT_LEADCHNL_RV_LEAD_CHANNEL_STATUS: NORMAL
MDC_IDC_MSMT_LEADCHNL_RV_PACING_THRESHOLD_AMPLITUDE: 0.8 V
MDC_IDC_MSMT_LEADCHNL_RV_PACING_THRESHOLD_PULSEWIDTH: 0.4 MS
MDC_IDC_PG_IMPLANT_DTM: NORMAL
MDC_IDC_PG_MFG: NORMAL
MDC_IDC_PG_MODEL: NORMAL
MDC_IDC_PG_SERIAL: NORMAL
MDC_IDC_PG_TYPE: NORMAL
MDC_IDC_SESS_CLINIC_NAME: NORMAL
MDC_IDC_SESS_DTM: NORMAL
MDC_IDC_SESS_REPROGRAMMED: NO
MDC_IDC_SESS_TYPE: NORMAL
MDC_IDC_SET_BRADY_AT_MODE_SWITCH_MODE: NORMAL
MDC_IDC_SET_BRADY_AT_MODE_SWITCH_RATE: 180 {BEATS}/MIN
MDC_IDC_SET_BRADY_LOWRATE: 60 {BEATS}/MIN
MDC_IDC_SET_BRADY_MAX_SENSOR_RATE: 120 {BEATS}/MIN
MDC_IDC_SET_BRADY_MAX_TRACKING_RATE: 130 {BEATS}/MIN
MDC_IDC_SET_BRADY_MODE: NORMAL
MDC_IDC_SET_BRADY_PAV_DELAY_HIGH: 160 MS
MDC_IDC_SET_BRADY_PAV_DELAY_LOW: 200 MS
MDC_IDC_SET_BRADY_SAV_DELAY_HIGH: 130 MS
MDC_IDC_SET_BRADY_SAV_DELAY_LOW: 170 MS
MDC_IDC_SET_LEADCHNL_RA_PACING_AMPLITUDE: 2 V
MDC_IDC_SET_LEADCHNL_RA_PACING_ANODE_ELECTRODE_1: NORMAL
MDC_IDC_SET_LEADCHNL_RA_PACING_ANODE_LOCATION_1: NORMAL
MDC_IDC_SET_LEADCHNL_RA_PACING_CAPTURE_MODE: NORMAL
MDC_IDC_SET_LEADCHNL_RA_PACING_CATHODE_ELECTRODE_1: NORMAL
MDC_IDC_SET_LEADCHNL_RA_PACING_CATHODE_LOCATION_1: NORMAL
MDC_IDC_SET_LEADCHNL_RA_PACING_POLARITY: NORMAL
MDC_IDC_SET_LEADCHNL_RA_PACING_PULSEWIDTH: 0.4 MS
MDC_IDC_SET_LEADCHNL_RA_SENSING_ADAPTATION_MODE: NORMAL
MDC_IDC_SET_LEADCHNL_RA_SENSING_ANODE_ELECTRODE_1: NORMAL
MDC_IDC_SET_LEADCHNL_RA_SENSING_ANODE_LOCATION_1: NORMAL
MDC_IDC_SET_LEADCHNL_RA_SENSING_CATHODE_ELECTRODE_1: NORMAL
MDC_IDC_SET_LEADCHNL_RA_SENSING_CATHODE_LOCATION_1: NORMAL
MDC_IDC_SET_LEADCHNL_RA_SENSING_POLARITY: NORMAL
MDC_IDC_SET_LEADCHNL_RV_PACING_AMPLITUDE: 1.3 V
MDC_IDC_SET_LEADCHNL_RV_PACING_ANODE_ELECTRODE_1: NORMAL
MDC_IDC_SET_LEADCHNL_RV_PACING_ANODE_LOCATION_1: NORMAL
MDC_IDC_SET_LEADCHNL_RV_PACING_CAPTURE_MODE: NORMAL
MDC_IDC_SET_LEADCHNL_RV_PACING_CATHODE_ELECTRODE_1: NORMAL
MDC_IDC_SET_LEADCHNL_RV_PACING_CATHODE_LOCATION_1: NORMAL
MDC_IDC_SET_LEADCHNL_RV_PACING_POLARITY: NORMAL
MDC_IDC_SET_LEADCHNL_RV_PACING_PULSEWIDTH: 0.4 MS
MDC_IDC_SET_LEADCHNL_RV_SENSING_ADAPTATION_MODE: NORMAL
MDC_IDC_SET_LEADCHNL_RV_SENSING_ANODE_ELECTRODE_1: NORMAL
MDC_IDC_SET_LEADCHNL_RV_SENSING_ANODE_LOCATION_1: NORMAL
MDC_IDC_SET_LEADCHNL_RV_SENSING_CATHODE_ELECTRODE_1: NORMAL
MDC_IDC_SET_LEADCHNL_RV_SENSING_CATHODE_LOCATION_1: NORMAL
MDC_IDC_SET_LEADCHNL_RV_SENSING_POLARITY: NORMAL
MDC_IDC_STAT_AT_BURDEN_PERCENT: 11 %
MDC_IDC_STAT_AT_DTM_END: NORMAL
MDC_IDC_STAT_AT_DTM_START: NORMAL
MDC_IDC_STAT_AT_MODE_SW_COUNT_PER_DAY: 81
MDC_IDC_STAT_AT_MODE_SW_PERCENT_TIME_PER_DAY: 10 %
MDC_IDC_STAT_BRADY_AP_VP_PERCENT: 61 %
MDC_IDC_STAT_BRADY_AP_VS_PERCENT: 0 %
MDC_IDC_STAT_BRADY_AS_VP_PERCENT: 38 %
MDC_IDC_STAT_BRADY_AS_VS_PERCENT: 0 %
MDC_IDC_STAT_BRADY_DTM_END: NORMAL
MDC_IDC_STAT_BRADY_DTM_START: NORMAL
MDC_IDC_STAT_BRADY_RA_PERCENT_PACED: 49 %
MDC_IDC_STAT_BRADY_RV_PERCENT_PACED: 98 %
MDC_IDC_STAT_HEART_RATE_ATRIAL_MEAN: 97 {BEATS}/MIN
MDC_IDC_STAT_HEART_RATE_DTM_END: NORMAL
MDC_IDC_STAT_HEART_RATE_DTM_START: NORMAL
MDC_IDC_STAT_HEART_RATE_VENTRICULAR_MEAN: 74 {BEATS}/MIN

## 2025-06-09 PROCEDURE — 93296 REM INTERROG EVL PM/IDS: CPT | Performed by: INTERNAL MEDICINE

## 2025-06-09 PROCEDURE — 93294 REM INTERROG EVL PM/LDLS PM: CPT | Performed by: INTERNAL MEDICINE

## 2025-07-10 ENCOUNTER — OFFICE VISIT (OUTPATIENT)
Dept: NEUROLOGY | Facility: CLINIC | Age: 85
End: 2025-07-10
Payer: MEDICARE

## 2025-07-10 VITALS
WEIGHT: 194.1 LBS | HEART RATE: 73 BPM | BODY MASS INDEX: 29.51 KG/M2 | SYSTOLIC BLOOD PRESSURE: 136 MMHG | DIASTOLIC BLOOD PRESSURE: 77 MMHG | OXYGEN SATURATION: 97 %

## 2025-07-10 DIAGNOSIS — R29.818 TRANSIENT NEUROLOGICAL SYMPTOMS: ICD-10-CM

## 2025-07-10 DIAGNOSIS — G43.109 OCULAR MIGRAINE: Primary | ICD-10-CM

## 2025-07-10 NOTE — LETTER
"7/10/2025      Reynaldo Monteiro  [address] [facility] [place] [address]      Dear Colleague,    Thank you for referring your patient, Reynaldo Monteiro, to the [facility]. Please see a copy of my visit note below.      Neurology Consultation    Patient Name:  Reynaldo Monteiro  MRN:  [id number]    :  1940  Date of Service:  July 10, 2025  Primary care provider:  Mick Torres        Chief Complaint: Follow-up     History of Present Illness:     Reynaldo Monteiro is a 85 year old male who presents for routine follow-up concerning transient visual disturbance.     Had an episode just last week where he had some facial numbness (unclear what distribution), felt dizzy/imbalance like he needed to sit down, and then word finding difficulty. Lasted for about 5 minutes.  He started to have a headache after this.  This was mild.  He is not sure how long the headache lasted but at least a couple of hours.  Denies photophobia or phonophobia.  Denies nausea or vomiting.  No weakness or facial droop.     He has had about 6 episodes of the visual disturbance. Now having headaches after them. They are mild.  No photophobia, phonophobia, nausea, and vomiting.      Having trouble going back to sleep after getting up frequently to use the bathroom.  He is not sure if he sleeps.      HEAD MRI:  1.  No acute intracranial pathology.  2.  Mild generalized cerebral volume loss and chronic small vessel ischemic disease.     HEAD MRA:  Patent major intracranial arteries without large vessel occlusion, high-grade stenosis or aneurysm.     NECK MRA:  Patent major cervical arteries without high-grade stenosis or dissection.    Prior History from 2025:     Had an episode about 10-15 years ago where he ws driving and had flashes of light \"like a lightening strike or flicker of light\" that would start in the low part of his vision in the corner and then gradually spread and travel across his " vision. He is uncertain if it is monocular or binocular. Eyes are generally sensitive to light but not particularly during these episodes.  Not associated headache. Lasted for about 10 minutes.  He infrequently got these episodes until he had TAVR about 2 years and were coming more frequently.  After this they decreased in frequency but still having them maybe 1-2/week.  It does not cause loss of vision or diplopia.  He has not identified a pattern or a specific trigger.   Denies change in consciousness with these episodes. Denies history of migraines or regular headaches. Denies family history of migraines.  He gets his eyes checked regularly, at least once a year, nothing new in terms of his vision.      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  Vision has come back to some extent but not back to baseline.  Had cataract surgery 3 years ago.       Past Medical History:  - Aortic stenosis s/p TAVR and pacemaker placement, paroxsymal atrial fibrillation on apixaban, hyperlipidemia, hypertension, diabetes mellitus type II, lumbar stenosis with myelopathy     Physical Exam:  /77   Pulse 73   Wt 88 kg (194 lb 1.6 oz)   SpO2 97%   BMI 29.51 kg/m      General:  No acute distress  Cardiovascular: Normal rate.  Lung: Respirations are non-labored.  Extremities: Normal range of motion  Integumentary: Warm and dry     Neurologic:  Mental status : alert, fund of knowledge appropriate for visit     LANGUAGE: Speech fluent, no dysarthria      CN:  II- pupils equal and reactive, visual fields full, funduscopic exam difficult unable to appreciate fundi   III, IV, VI- extraocular movements intact  V- sensation intact bilaterally  VII- face symmetric except mild left eyelid ptosis (has been there for 20 years)  VIII- hearing intact, no nystagmus  IX, X- palate elevates symmetrically  XI- sternocleidomastoid 5/5  XII- tongue midline     MOTOR : intact bulk  5/5 strength in all ext      SENSORY : Intact to light touch in  BUE and BLE      REFLEXES: Lott absent      MOVEMENT/COORDINATION: finger to nose, finger taps (missing left index finger- intact finger taps with middle finger and thumb) , and heel to shin intact bilaterally      GAIT : Wider based gait, varus at the knee, appropriate stride length and speed     Psychiatric: Cooperative, Appropriate mood & affect.    Assessment/Plan:   Reynaldo Monteiro is a 85 year old male who presents for routine follow-up concerning transient visual disturbance. Suspect that these episodes are migraine aura/ocular migraines.  MRI brain and MRA H/N was generally unrevealing.  He just had his eyes checked and reportedly everything stable. They are watching possible macular degeneration. He has had about 6 episodes since last being seen.  We discussed starting a preventative including migraine supplements.  He would like to identify triggers which he thinks lack of sleep might be one of them. His lack of sleep is related to frequent nocturia.  He will discuss with his PCP. We discussed avoiding caffeine and limiting fluid intact after a certain hour.  Also discussed using melatonin as a sleep aid.      He had an episode last week that was different than his typical episode.  It also sounds like it could be migraine related; however, can not rule out a vascular event.  He just had MRI brain, MRA H and N and is already on maximum stroke prevention with Eliquis and rosuvastatin.  I think we can hold off on repeating imaging.      Plan  > Follow-up in 3 months     I spent 39 minutes providing care for this patient on the date of service, including reviewing imaging, labs, and notes as well as providing counseling and coordination of care for the above issues.      Again, thank you for allowing me to participate in the care of your patient.        Sincerely,        Ignacia Mims, DO    Electronically signed

## 2025-07-10 NOTE — PROGRESS NOTES
"  Neurology Consultation    Patient Name:  Reynaldo Monteiro  MRN:  [id number]    :  1940  Date of Service:  July 10, 2025  Primary care provider:  Mick Torres        Chief Complaint: Follow-up     History of Present Illness:     Reynaldo Monteiro is a 85 year old male who presents for routine follow-up concerning transient visual disturbance.     Had an episode just last week where he had some facial numbness (unclear what distribution), felt dizzy/imbalance like he needed to sit down, and then word finding difficulty. Lasted for about 5 minutes.  He started to have a headache after this.  This was mild.  He is not sure how long the headache lasted but at least a couple of hours.  Denies photophobia or phonophobia.  Denies nausea or vomiting.  No weakness or facial droop.     He has had about 6 episodes of the visual disturbance. Now having headaches after them. They are mild.  No photophobia, phonophobia, nausea, and vomiting.      Having trouble going back to sleep after getting up frequently to use the bathroom.  He is not sure if he sleeps.      HEAD MRI:  1.  No acute intracranial pathology.  2.  Mild generalized cerebral volume loss and chronic small vessel ischemic disease.     HEAD MRA:  Patent major intracranial arteries without large vessel occlusion, high-grade stenosis or aneurysm.     NECK MRA:  Patent major cervical arteries without high-grade stenosis or dissection.    Prior History from 2025:     Had an episode about 10-15 years ago where he ws driving and had flashes of light \"like a lightening strike or flicker of light\" that would start in the low part of his vision in the corner and then gradually spread and travel across his vision. He is uncertain if it is monocular or binocular. Eyes are generally sensitive to light but not particularly during these episodes.  Not associated headache. Lasted for about 10 minutes.  He infrequently got these episodes until he had TAVR about 2 " years and were coming more frequently.  After this they decreased in frequency but still having them maybe 1-2/week.  It does not cause loss of vision or diplopia.  He has not identified a pattern or a specific trigger.   Denies change in consciousness with these episodes. Denies history of migraines or regular headaches. Denies family history of migraines.  He gets his eyes checked regularly, at least once a year, nothing new in terms of his vision.      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  Vision has come back to some extent but not back to baseline.  Had cataract surgery 3 years ago.       Past Medical History:  - Aortic stenosis s/p TAVR and pacemaker placement, paroxsymal atrial fibrillation on apixaban, hyperlipidemia, hypertension, diabetes mellitus type II, lumbar stenosis with myelopathy     Physical Exam:  /77   Pulse 73   Wt 88 kg (194 lb 1.6 oz)   SpO2 97%   BMI 29.51 kg/m      General:  No acute distress  Cardiovascular: Normal rate.  Lung: Respirations are non-labored.  Extremities: Normal range of motion  Integumentary: Warm and dry     Neurologic:  Mental status : alert, fund of knowledge appropriate for visit     LANGUAGE: Speech fluent, no dysarthria      CN:  II- pupils equal and reactive, visual fields full, funduscopic exam difficult unable to appreciate fundi   III, IV, VI- extraocular movements intact  V- sensation intact bilaterally  VII- face symmetric except mild left eyelid ptosis (has been there for 20 years)  VIII- hearing intact, no nystagmus  IX, X- palate elevates symmetrically  XI- sternocleidomastoid 5/5  XII- tongue midline     MOTOR : intact bulk  5/5 strength in all ext      SENSORY : Intact to light touch in BUE and BLE      REFLEXES: Lott absent      MOVEMENT/COORDINATION: finger to nose, finger taps (missing left index finger- intact finger taps with middle finger and thumb) , and heel to shin intact bilaterally      GAIT : Wider based gait, varus at the  knee, appropriate stride length and speed     Psychiatric: Cooperative, Appropriate mood & affect.    Assessment/Plan:   Reynaldo Monteiro is a 85 year old male who presents for routine follow-up concerning transient visual disturbance. Suspect that these episodes are migraine aura/ocular migraines.  MRI brain and MRA H/N was generally unrevealing.  He just had his eyes checked and reportedly everything stable. They are watching possible macular degeneration. He has had about 6 episodes since last being seen.  We discussed starting a preventative including migraine supplements.  He would like to identify triggers which he thinks lack of sleep might be one of them. His lack of sleep is related to frequent nocturia.  He will discuss with his PCP. We discussed avoiding caffeine and limiting fluid intact after a certain hour.  Also discussed using melatonin as a sleep aid.      He had an episode last week that was different than his typical episode.  It also sounds like it could be migraine related; however, can not rule out a vascular event.  He just had MRI brain, MRA H and N and is already on maximum stroke prevention with Eliquis and rosuvastatin.  I think we can hold off on repeating imaging.      Plan  > Follow-up in 3 months     I spent 39 minutes providing care for this patient on the date of service, including reviewing imaging, labs, and notes as well as providing counseling and coordination of care for the above issues.

## 2025-09-02 DIAGNOSIS — I48.0 PAROXYSMAL ATRIAL FIBRILLATION (H): ICD-10-CM

## 2025-09-02 DIAGNOSIS — I10 BENIGN ESSENTIAL HYPERTENSION: Primary | ICD-10-CM

## 2025-09-02 DIAGNOSIS — E78.5 HYPERLIPIDEMIA LDL GOAL <70: ICD-10-CM

## 2025-09-02 DIAGNOSIS — Z95.3 S/P TAVR (TRANSCATHETER AORTIC VALVE REPLACEMENT), BIOPROSTHETIC: ICD-10-CM

## 2025-09-04 ENCOUNTER — LAB (OUTPATIENT)
Dept: LAB | Facility: CLINIC | Age: 85
End: 2025-09-04
Payer: MEDICARE

## 2025-09-04 DIAGNOSIS — E78.5 HYPERLIPIDEMIA LDL GOAL <70: ICD-10-CM

## 2025-09-04 DIAGNOSIS — I10 BENIGN ESSENTIAL HYPERTENSION: ICD-10-CM

## 2025-09-04 DIAGNOSIS — I48.0 PAROXYSMAL ATRIAL FIBRILLATION (H): ICD-10-CM

## 2025-09-04 DIAGNOSIS — Z95.3 S/P TAVR (TRANSCATHETER AORTIC VALVE REPLACEMENT), BIOPROSTHETIC: ICD-10-CM

## 2025-09-04 LAB
ALT SERPL W P-5'-P-CCNC: 33 U/L (ref 0–70)
ANION GAP SERPL CALCULATED.3IONS-SCNC: 11 MMOL/L (ref 7–15)
BASOPHILS # BLD AUTO: 0.03 10E3/UL (ref 0–0.2)
BASOPHILS NFR BLD AUTO: 0.5 %
BUN SERPL-MCNC: 15.3 MG/DL (ref 8–23)
CALCIUM SERPL-MCNC: 8.9 MG/DL (ref 8.8–10.4)
CHLORIDE SERPL-SCNC: 102 MMOL/L (ref 98–107)
CHOLEST SERPL-MCNC: 112 MG/DL
CREAT SERPL-MCNC: 0.84 MG/DL (ref 0.67–1.17)
EGFRCR SERPLBLD CKD-EPI 2021: 87 ML/MIN/1.73M2
EOSINOPHIL # BLD AUTO: 0.11 10E3/UL (ref 0–0.7)
EOSINOPHIL NFR BLD AUTO: 1.9 %
ERYTHROCYTE [DISTWIDTH] IN BLOOD BY AUTOMATED COUNT: 13.2 % (ref 10–15)
FASTING STATUS PATIENT QL REPORTED: YES
GLUCOSE SERPL-MCNC: 160 MG/DL (ref 70–99)
HCO3 SERPL-SCNC: 23 MMOL/L (ref 22–29)
HCT VFR BLD AUTO: 49.4 % (ref 40–53)
HDLC SERPL-MCNC: 35 MG/DL
HGB BLD-MCNC: 16.4 G/DL (ref 13.3–17.7)
IMM GRANULOCYTES # BLD: <0.03 10E3/UL
IMM GRANULOCYTES NFR BLD: 0.4 %
LDLC SERPL CALC-MCNC: 48 MG/DL
LYMPHOCYTES # BLD AUTO: 1.64 10E3/UL (ref 0.8–5.3)
LYMPHOCYTES NFR BLD AUTO: 28.9 %
MCH RBC QN AUTO: 29.8 PG (ref 26.5–33)
MCHC RBC AUTO-ENTMCNC: 33.2 G/DL (ref 31.5–36.5)
MCV RBC AUTO: 89.7 FL (ref 78–100)
MONOCYTES # BLD AUTO: 0.55 10E3/UL (ref 0–1.3)
MONOCYTES NFR BLD AUTO: 9.7 %
NEUTROPHILS # BLD AUTO: 3.33 10E3/UL (ref 1.6–8.3)
NEUTROPHILS NFR BLD AUTO: 58.6 %
NONHDLC SERPL-MCNC: 77 MG/DL
NRBC # BLD AUTO: <0.03 10E3/UL
NRBC BLD AUTO-RTO: 0 /100
PLATELET # BLD AUTO: 138 10E3/UL (ref 150–450)
POTASSIUM SERPL-SCNC: 4.2 MMOL/L (ref 3.4–5.3)
RBC # BLD AUTO: 5.51 10E6/UL (ref 4.4–5.9)
SODIUM SERPL-SCNC: 136 MMOL/L (ref 135–145)
TRIGL SERPL-MCNC: 145 MG/DL
WBC # BLD AUTO: 5.68 10E3/UL (ref 4–11)

## (undated) DEVICE — TOTE ANGIO CORP PC15AT SAN32CC83O

## (undated) DEVICE — CABLE PCNG 12FT REMINGTON PACI

## (undated) DEVICE — SYR LOCKING ATRION QL38

## (undated) DEVICE — MANIFOLD KIT ANGIO AUTOMATED 014613

## (undated) DEVICE — INTRO TERUMO 7FRX25CM W/MARKER RSB703

## (undated) DEVICE — CATH ANGIO SUPERTORQUE AL1 6FRX100CM 532-645

## (undated) DEVICE — INTRO TERUMO 6FRX25CM W/MARKER RSB603

## (undated) DEVICE — SHEATH PRELUDE SNAP 13CM 6FR

## (undated) DEVICE — Device

## (undated) DEVICE — RAD CLOSURE ANGIOSEAL 8FR  610131

## (undated) DEVICE — CATH DIAGNOSTIC RADIAL 5FR TIG 4.0

## (undated) DEVICE — RAD INTRODUCER KIT MICRO 5FRX10CM .018 NITINOL G/W

## (undated) DEVICE — SYR ANGIOGRAPHY MULTIUSE KIT ACIST 014612

## (undated) DEVICE — KIT HAND CONTROL ANGIOTOUCH ACIST 65CM AT-P65

## (undated) DEVICE — GUIDEWIRE VASC SAFARI2 0.035X275CM H74939406XS1

## (undated) DEVICE — DEFIB PRO-PADZ LVP LQD GEL ADULT 8900-2105-01

## (undated) DEVICE — WIRE GUIDE LUNDERQUIST 0.035"X260CM DBL CVD

## (undated) DEVICE — CATH ANGIO INFINITI PIGTAIL 145 6 SH 6FRX110CM  534-652S

## (undated) DEVICE — SLEEVE TR BAND RADIAL COMPRESSION DEVICE 24CM TRB24-REG

## (undated) DEVICE — CATH EP PACEL 5FRX110CM 1MM RIGHT HEART CURVE 401763

## (undated) DEVICE — PACK PCMKR PERM SRG PROC LF SAN32PC573

## (undated) DEVICE — INTRO GLIDESHEATH SLENDER 6FR 10X45CM 60-1060

## (undated) DEVICE — GAUGE DEPTH LOCATOR MANTA 8FR 188F

## (undated) DEVICE — SYSTEM DELIVERY MECHANISM COMMANDER 29MM

## (undated) DEVICE — CABLE PACING ALLIGATOR CLIP 301-CG

## (undated) DEVICE — WIRE GUIDE 0.035"X260CM SAFE-T-J EXCHANGE G00517

## (undated) RX ORDER — ASPIRIN 325 MG
TABLET ORAL
Status: DISPENSED
Start: 2022-11-29

## (undated) RX ORDER — FENTANYL CITRATE 50 UG/ML
INJECTION, SOLUTION INTRAMUSCULAR; INTRAVENOUS
Status: DISPENSED
Start: 2023-02-13

## (undated) RX ORDER — HEPARIN SODIUM 200 [USP'U]/100ML
INJECTION, SOLUTION INTRAVENOUS
Status: DISPENSED
Start: 2022-11-29

## (undated) RX ORDER — LIDOCAINE HYDROCHLORIDE 40 MG/ML
SOLUTION TOPICAL
Status: DISPENSED
Start: 2018-11-01

## (undated) RX ORDER — LIDOCAINE HYDROCHLORIDE 10 MG/ML
INJECTION, SOLUTION EPIDURAL; INFILTRATION; INTRACAUDAL; PERINEURAL
Status: DISPENSED
Start: 2023-02-07

## (undated) RX ORDER — FENTANYL CITRATE 50 UG/ML
INJECTION, SOLUTION INTRAMUSCULAR; INTRAVENOUS
Status: DISPENSED
Start: 2018-11-01

## (undated) RX ORDER — VERAPAMIL HYDROCHLORIDE 2.5 MG/ML
INJECTION, SOLUTION INTRAVENOUS
Status: DISPENSED
Start: 2022-11-29

## (undated) RX ORDER — NALOXONE HYDROCHLORIDE 0.4 MG/ML
INJECTION, SOLUTION INTRAMUSCULAR; INTRAVENOUS; SUBCUTANEOUS
Status: DISPENSED
Start: 2018-11-01

## (undated) RX ORDER — HEPARIN SODIUM 1000 [USP'U]/ML
INJECTION, SOLUTION INTRAVENOUS; SUBCUTANEOUS
Status: DISPENSED
Start: 2023-02-07

## (undated) RX ORDER — BUPIVACAINE HYDROCHLORIDE 2.5 MG/ML
INJECTION, SOLUTION EPIDURAL; INFILTRATION; INTRACAUDAL
Status: DISPENSED
Start: 2023-02-13

## (undated) RX ORDER — GLYCOPYRROLATE 0.2 MG/ML
INJECTION, SOLUTION INTRAMUSCULAR; INTRAVENOUS
Status: DISPENSED
Start: 2018-11-01

## (undated) RX ORDER — LIDOCAINE HYDROCHLORIDE 10 MG/ML
INJECTION, SOLUTION EPIDURAL; INFILTRATION; INTRACAUDAL; PERINEURAL
Status: DISPENSED
Start: 2022-11-29

## (undated) RX ORDER — FENTANYL CITRATE 50 UG/ML
INJECTION, SOLUTION INTRAMUSCULAR; INTRAVENOUS
Status: DISPENSED
Start: 2022-11-29

## (undated) RX ORDER — FENTANYL CITRATE 50 UG/ML
INJECTION, SOLUTION INTRAMUSCULAR; INTRAVENOUS
Status: DISPENSED
Start: 2023-02-07

## (undated) RX ORDER — ASPIRIN 81 MG/1
TABLET, CHEWABLE ORAL
Status: DISPENSED
Start: 2023-02-07

## (undated) RX ORDER — NITROGLYCERIN 5 MG/ML
VIAL (ML) INTRAVENOUS
Status: DISPENSED
Start: 2022-11-29

## (undated) RX ORDER — HEPARIN SODIUM 1000 [USP'U]/ML
INJECTION, SOLUTION INTRAVENOUS; SUBCUTANEOUS
Status: DISPENSED
Start: 2022-11-29

## (undated) RX ORDER — HEPARIN SODIUM 200 [USP'U]/100ML
INJECTION, SOLUTION INTRAVENOUS
Status: DISPENSED
Start: 2023-02-07

## (undated) RX ORDER — LIDOCAINE HYDROCHLORIDE 10 MG/ML
INJECTION, SOLUTION EPIDURAL; INFILTRATION; INTRACAUDAL; PERINEURAL
Status: DISPENSED
Start: 2023-02-13

## (undated) RX ORDER — FLUMAZENIL 0.1 MG/ML
INJECTION, SOLUTION INTRAVENOUS
Status: DISPENSED
Start: 2018-11-01